# Patient Record
Sex: MALE | Race: WHITE | Employment: OTHER | URBAN - METROPOLITAN AREA
[De-identification: names, ages, dates, MRNs, and addresses within clinical notes are randomized per-mention and may not be internally consistent; named-entity substitution may affect disease eponyms.]

---

## 2017-03-30 ENCOUNTER — HOSPITAL ENCOUNTER (OUTPATIENT)
Dept: OTHER | Age: 62
Discharge: HOME | End: 2017-03-30
Attending: INTERNAL MEDICINE | Admitting: INTERNAL MEDICINE

## 2017-03-30 LAB
ANION GAP SERPL CALCULATED.3IONS-SCNC: 14 MMOL/L (ref 3–16)
BUN BLDV-MCNC: 13 MG/DL (ref 7–20)
CALCIUM SERPL-MCNC: 8.4 MG/DL (ref 8.3–10.6)
CHLORIDE BLD-SCNC: 101 MMOL/L (ref 99–110)
CO2: 21 MMOL/L (ref 21–32)
CREAT SERPL-MCNC: 1 MG/DL (ref 0.8–1.3)
GFR AFRICAN AMERICAN: >60
GFR NON-AFRICAN AMERICAN: >60
GLUCOSE BLD-MCNC: 90 MG/DL (ref 70–99)
POTASSIUM SERPL-SCNC: 4.4 MMOL/L (ref 3.5–5.1)
SODIUM BLD-SCNC: 136 MMOL/L (ref 136–145)

## 2017-04-12 ENCOUNTER — HOSPITAL ENCOUNTER (OUTPATIENT)
Dept: OTHER | Age: 62
Discharge: HOME | End: 2017-04-12
Attending: INTERNAL MEDICINE | Admitting: INTERNAL MEDICINE

## 2017-04-12 DIAGNOSIS — R52 PAIN: ICD-10-CM

## 2018-09-21 ENCOUNTER — HOSPITAL ENCOUNTER (OUTPATIENT)
Dept: CT IMAGING | Age: 63
Discharge: HOME | End: 2018-09-21
Payer: COMMERCIAL

## 2018-09-21 DIAGNOSIS — L03.116 CELLULITIS OF LEFT LOWER EXTREMITY: ICD-10-CM

## 2018-09-21 PROCEDURE — 73700 CT LOWER EXTREMITY W/O DYE: CPT

## 2018-12-20 ENCOUNTER — OFFICE VISIT (OUTPATIENT)
Dept: ORTHOPEDIC SURGERY | Age: 63
End: 2018-12-20
Payer: COMMERCIAL

## 2018-12-20 VITALS — BODY MASS INDEX: 37.19 KG/M2 | HEIGHT: 77 IN | WEIGHT: 315 LBS

## 2018-12-20 DIAGNOSIS — M17.0 PRIMARY OSTEOARTHRITIS OF BOTH KNEES: Primary | ICD-10-CM

## 2018-12-20 DIAGNOSIS — M25.562 ACUTE PAIN OF BOTH KNEES: ICD-10-CM

## 2018-12-20 DIAGNOSIS — M25.561 ACUTE PAIN OF BOTH KNEES: ICD-10-CM

## 2018-12-20 PROCEDURE — 99243 OFF/OP CNSLTJ NEW/EST LOW 30: CPT | Performed by: ORTHOPAEDIC SURGERY

## 2018-12-20 RX ORDER — LEVOTHYROXINE SODIUM 112 UG/1
112 CAPSULE ORAL DAILY
COMMUNITY

## 2018-12-20 RX ORDER — GABAPENTIN 300 MG/1
300 CAPSULE ORAL 3 TIMES DAILY
COMMUNITY

## 2018-12-20 RX ORDER — MONTELUKAST SODIUM 10 MG/1
10 TABLET ORAL NIGHTLY
COMMUNITY

## 2018-12-20 RX ORDER — AZELASTINE 1 MG/ML
1 SPRAY, METERED NASAL 2 TIMES DAILY
COMMUNITY

## 2018-12-20 RX ORDER — METOPROLOL SUCCINATE 50 MG/1
200 TABLET, EXTENDED RELEASE ORAL DAILY
COMMUNITY
Start: 2017-08-28

## 2018-12-20 RX ORDER — LISINOPRIL 40 MG/1
30 TABLET ORAL DAILY
COMMUNITY
Start: 2017-03-08

## 2018-12-20 RX ORDER — VARDENAFIL HYDROCHLORIDE 20 MG/1
TABLET ORAL
COMMUNITY

## 2018-12-20 RX ORDER — METOPROLOL SUCCINATE 100 MG/1
100 TABLET, EXTENDED RELEASE ORAL
COMMUNITY
Start: 2017-01-23 | End: 2019-09-19

## 2018-12-20 RX ORDER — CLONAZEPAM 1 MG/1
TABLET ORAL
COMMUNITY
End: 2021-11-28 | Stop reason: ALTCHOICE

## 2018-12-20 RX ORDER — WARFARIN SODIUM 5 MG/1
5 TABLET ORAL
COMMUNITY

## 2018-12-20 RX ORDER — TORSEMIDE 20 MG/1
20 TABLET ORAL DAILY
COMMUNITY
Start: 2016-04-13 | End: 2021-11-28 | Stop reason: ALTCHOICE

## 2018-12-20 RX ORDER — LORATADINE 10 MG/1
10 TABLET ORAL DAILY
COMMUNITY

## 2018-12-20 RX ORDER — ATORVASTATIN CALCIUM 10 MG/1
10 TABLET, FILM COATED ORAL DAILY
COMMUNITY

## 2018-12-20 RX ORDER — DIGOXIN 250 MCG
125 TABLET ORAL DAILY
COMMUNITY
Start: 2017-08-21

## 2019-01-17 ENCOUNTER — TELEPHONE (OUTPATIENT)
Dept: ORTHOPEDIC SURGERY | Age: 64
End: 2019-01-17

## 2019-02-04 ENCOUNTER — TELEPHONE (OUTPATIENT)
Dept: ORTHOPEDIC SURGERY | Age: 64
End: 2019-02-04

## 2019-02-13 ENCOUNTER — APPOINTMENT (OUTPATIENT)
Dept: GENERAL RADIOLOGY | Age: 64
End: 2019-02-13
Payer: COMMERCIAL

## 2019-02-13 ENCOUNTER — HOSPITAL ENCOUNTER (EMERGENCY)
Age: 64
Discharge: HOME | End: 2019-02-13
Attending: EMERGENCY MEDICINE
Payer: COMMERCIAL

## 2019-02-13 VITALS
OXYGEN SATURATION: 96 % | SYSTOLIC BLOOD PRESSURE: 129 MMHG | BODY MASS INDEX: 37.19 KG/M2 | RESPIRATION RATE: 21 BRPM | HEART RATE: 94 BPM | TEMPERATURE: 97.5 F | DIASTOLIC BLOOD PRESSURE: 84 MMHG | HEIGHT: 77 IN | WEIGHT: 315 LBS

## 2019-02-13 DIAGNOSIS — R07.89 ATYPICAL CHEST PAIN: Primary | ICD-10-CM

## 2019-02-13 LAB
A/G RATIO: 1.3 (ref 1.1–2.2)
ALBUMIN SERPL-MCNC: 4.3 G/DL (ref 3.4–5)
ALP BLD-CCNC: 59 U/L (ref 40–129)
ALT SERPL-CCNC: 28 U/L (ref 10–40)
ANION GAP SERPL CALCULATED.3IONS-SCNC: 16 MMOL/L (ref 3–16)
APTT: 34.6 SEC (ref 26–36)
AST SERPL-CCNC: 27 U/L (ref 15–37)
BASOPHILS ABSOLUTE: 0.1 K/UL (ref 0–0.2)
BASOPHILS RELATIVE PERCENT: 1.1 %
BILIRUB SERPL-MCNC: 1.4 MG/DL (ref 0–1)
BUN BLDV-MCNC: 22 MG/DL (ref 7–20)
CALCIUM SERPL-MCNC: 9.9 MG/DL (ref 8.3–10.6)
CHLORIDE BLD-SCNC: 100 MMOL/L (ref 99–110)
CO2: 28 MMOL/L (ref 21–32)
CREAT SERPL-MCNC: 0.9 MG/DL (ref 0.8–1.3)
EKG ATRIAL RATE: 63 BPM
EKG DIAGNOSIS: NORMAL
EKG Q-T INTERVAL: 358 MS
EKG QRS DURATION: 100 MS
EKG QTC CALCULATION (BAZETT): 461 MS
EKG R AXIS: 76 DEGREES
EKG T AXIS: -17 DEGREES
EKG VENTRICULAR RATE: 100 BPM
EOSINOPHILS ABSOLUTE: 0.2 K/UL (ref 0–0.6)
EOSINOPHILS RELATIVE PERCENT: 2.4 %
GFR AFRICAN AMERICAN: >60
GFR NON-AFRICAN AMERICAN: >60
GLOBULIN: 3.4 G/DL
GLUCOSE BLD-MCNC: 89 MG/DL (ref 70–99)
HCT VFR BLD CALC: 49.1 % (ref 40.5–52.5)
HEMOGLOBIN: 16.2 G/DL (ref 13.5–17.5)
INR BLD: 1.28 (ref 0.86–1.14)
LYMPHOCYTES ABSOLUTE: 1.9 K/UL (ref 1–5.1)
LYMPHOCYTES RELATIVE PERCENT: 26.3 %
MCH RBC QN AUTO: 29.9 PG (ref 26–34)
MCHC RBC AUTO-ENTMCNC: 33 G/DL (ref 31–36)
MCV RBC AUTO: 90.8 FL (ref 80–100)
MONOCYTES ABSOLUTE: 0.8 K/UL (ref 0–1.3)
MONOCYTES RELATIVE PERCENT: 10.6 %
NEUTROPHILS ABSOLUTE: 4.3 K/UL (ref 1.7–7.7)
NEUTROPHILS RELATIVE PERCENT: 59.6 %
PDW BLD-RTO: 15.4 % (ref 12.4–15.4)
PLATELET # BLD: 155 K/UL (ref 135–450)
PMV BLD AUTO: 9.6 FL (ref 5–10.5)
POTASSIUM SERPL-SCNC: 4.3 MMOL/L (ref 3.5–5.1)
PROTHROMBIN TIME: 14.5 SEC (ref 9.8–13)
RBC # BLD: 5.41 M/UL (ref 4.2–5.9)
SODIUM BLD-SCNC: 144 MMOL/L (ref 136–145)
TOTAL PROTEIN: 7.7 G/DL (ref 6.4–8.2)
TROPONIN: <0.01 NG/ML
TROPONIN: <0.01 NG/ML
WBC # BLD: 7.3 K/UL (ref 4–11)

## 2019-02-13 PROCEDURE — 36415 COLL VENOUS BLD VENIPUNCTURE: CPT

## 2019-02-13 PROCEDURE — 93010 ELECTROCARDIOGRAM REPORT: CPT | Performed by: INTERNAL MEDICINE

## 2019-02-13 PROCEDURE — 99285 EMERGENCY DEPT VISIT HI MDM: CPT

## 2019-02-13 PROCEDURE — 71045 X-RAY EXAM CHEST 1 VIEW: CPT

## 2019-02-13 PROCEDURE — 85730 THROMBOPLASTIN TIME PARTIAL: CPT

## 2019-02-13 PROCEDURE — 93005 ELECTROCARDIOGRAM TRACING: CPT | Performed by: EMERGENCY MEDICINE

## 2019-02-13 PROCEDURE — 84484 ASSAY OF TROPONIN QUANT: CPT

## 2019-02-13 PROCEDURE — 85610 PROTHROMBIN TIME: CPT

## 2019-02-13 PROCEDURE — 85025 COMPLETE CBC W/AUTO DIFF WBC: CPT

## 2019-02-13 PROCEDURE — 80053 COMPREHEN METABOLIC PANEL: CPT

## 2019-02-13 RX ORDER — SPIRONOLACTONE 25 MG/1
25 TABLET ORAL DAILY
COMMUNITY

## 2019-02-13 ASSESSMENT — ENCOUNTER SYMPTOMS
STRIDOR: 0
WHEEZING: 0
COUGH: 0
BACK PAIN: 0
CHOKING: 0
ABDOMINAL DISTENTION: 0
CHEST TIGHTNESS: 1
ABDOMINAL PAIN: 0
SHORTNESS OF BREATH: 0

## 2019-02-20 ENCOUNTER — OFFICE VISIT (OUTPATIENT)
Dept: ORTHOPEDIC SURGERY | Age: 64
End: 2019-02-20
Payer: COMMERCIAL

## 2019-02-20 VITALS — BODY MASS INDEX: 37.19 KG/M2 | HEIGHT: 77 IN | WEIGHT: 315 LBS

## 2019-02-20 DIAGNOSIS — M17.0 PRIMARY OSTEOARTHRITIS OF BOTH KNEES: Primary | ICD-10-CM

## 2019-02-20 PROCEDURE — 20610 DRAIN/INJ JOINT/BURSA W/O US: CPT | Performed by: ORTHOPAEDIC SURGERY

## 2019-02-27 ENCOUNTER — OFFICE VISIT (OUTPATIENT)
Dept: ORTHOPEDIC SURGERY | Age: 64
End: 2019-02-27
Payer: COMMERCIAL

## 2019-02-27 DIAGNOSIS — M17.0 PRIMARY OSTEOARTHRITIS OF BOTH KNEES: Primary | ICD-10-CM

## 2019-02-27 PROCEDURE — 20610 DRAIN/INJ JOINT/BURSA W/O US: CPT | Performed by: ORTHOPAEDIC SURGERY

## 2019-03-06 ENCOUNTER — OFFICE VISIT (OUTPATIENT)
Dept: ORTHOPEDIC SURGERY | Age: 64
End: 2019-03-06
Payer: COMMERCIAL

## 2019-03-06 DIAGNOSIS — M17.0 PRIMARY OSTEOARTHRITIS OF BOTH KNEES: Primary | ICD-10-CM

## 2019-03-06 PROCEDURE — 20610 DRAIN/INJ JOINT/BURSA W/O US: CPT | Performed by: ORTHOPAEDIC SURGERY

## 2019-09-19 ENCOUNTER — HOSPITAL ENCOUNTER (OUTPATIENT)
Dept: WOUND CARE | Age: 64
Discharge: HOME | End: 2019-09-19
Payer: OTHER GOVERNMENT

## 2019-09-19 VITALS
DIASTOLIC BLOOD PRESSURE: 75 MMHG | RESPIRATION RATE: 18 BRPM | BODY MASS INDEX: 37.19 KG/M2 | WEIGHT: 315 LBS | HEIGHT: 77 IN | HEART RATE: 93 BPM | TEMPERATURE: 97.5 F | SYSTOLIC BLOOD PRESSURE: 112 MMHG

## 2019-09-19 DIAGNOSIS — I83.029 VENOUS ULCER OF LEFT LEG (HCC): ICD-10-CM

## 2019-09-19 DIAGNOSIS — L97.929 VENOUS ULCER OF LEFT LEG (HCC): ICD-10-CM

## 2019-09-19 PROCEDURE — 29580 STRAPPING UNNA BOOT: CPT

## 2019-09-19 PROCEDURE — 97597 DBRDMT OPN WND 1ST 20 CM/<: CPT | Performed by: CLINICAL NURSE SPECIALIST

## 2019-09-19 PROCEDURE — 99203 OFFICE O/P NEW LOW 30 MIN: CPT | Performed by: CLINICAL NURSE SPECIALIST

## 2019-09-19 PROCEDURE — 97597 DBRDMT OPN WND 1ST 20 CM/<: CPT

## 2019-09-19 RX ORDER — LIDOCAINE 40 MG/G
CREAM TOPICAL ONCE
Status: DISCONTINUED | OUTPATIENT
Start: 2019-09-19 | End: 2019-09-20 | Stop reason: HOSPADM

## 2019-09-19 RX ORDER — TESTOSTERONE GEL, 1% 10 MG/G
GEL TRANSDERMAL DAILY
COMMUNITY
End: 2021-11-28 | Stop reason: ALTCHOICE

## 2019-09-19 ASSESSMENT — PAIN DESCRIPTION - PAIN TYPE: TYPE: CHRONIC PAIN

## 2019-09-19 ASSESSMENT — PAIN DESCRIPTION - PROGRESSION: CLINICAL_PROGRESSION: NOT CHANGED

## 2019-09-19 ASSESSMENT — PAIN DESCRIPTION - LOCATION: LOCATION: FOOT

## 2019-09-19 ASSESSMENT — PAIN DESCRIPTION - ONSET: ONSET: GRADUAL

## 2019-09-19 ASSESSMENT — PAIN DESCRIPTION - FREQUENCY: FREQUENCY: INTERMITTENT

## 2019-09-19 ASSESSMENT — PAIN DESCRIPTION - ORIENTATION: ORIENTATION: RIGHT

## 2019-09-19 ASSESSMENT — PAIN - FUNCTIONAL ASSESSMENT: PAIN_FUNCTIONAL_ASSESSMENT: ACTIVITIES ARE NOT PREVENTED

## 2019-09-19 ASSESSMENT — PAIN SCALES - GENERAL: PAINLEVEL_OUTOF10: 8

## 2019-09-19 NOTE — PLAN OF CARE
Wound debrided today per APRN, discussed importance of lifetime compression, he declines ordering of compression garment for LLE at this time, discussed importance of weight loss, patient is aware of need for walking in order for unna boot to be effective in healing wound, f/u x 1 week, MD orders/D/C instructions reviewed with patient, all questions answered; copy of instructions given to patient

## 2019-09-22 PROBLEM — I83.029 VENOUS ULCER OF LEFT LEG (HCC): Status: ACTIVE | Noted: 2019-09-22

## 2019-09-22 PROBLEM — L97.929 VENOUS ULCER OF LEFT LEG (HCC): Status: ACTIVE | Noted: 2019-09-22

## 2019-09-22 NOTE — PROGRESS NOTES
Objective:     Vitals:    09/19/19 1242   BP: 112/75   Pulse: 93   Resp: 18   Temp: 97.5 °F (36.4 °C)   Weight: (!) 407 lb (184.6 kg)   Height: 6' 5\" (1.956 m)     General Appearance: alert and oriented to person, place and time, obese, well developed and well-nourished, in no acute distress  Psychiatric:  Mood and affect appropriate for situation  Skin: warm and dry, no rash  Head: normocephalic and atraumatic  Eyes: pupils equal, round, sclerae anicteric, conjunctivae normal  ENT: no thrush or oral ulcers  Neck:No complaints, normal appearance  Pulmonary/Chest: Respirations easy at rest, no cough or respiratory distress  Cardiovascular: No chest pain, normal rate, toes warm, cap refill normal, JAROCHO: Right: 0.89, Left: 0.96  Abdomen: No nausea or vomiting  Extremities: no cyanosis or cellulitis. Bilateral leg edema and hemosiderin staining  Musculoskeletal: Ambulatory, moves all extremities, no deformities  Neurologic: distal sensation to light touch intact, no allodynia. Libby-ulcer skin: hemosiderin changes. Ulcer(s): Wound with red base and biofilm. Wound bed moist, moderate amount of serous drainage, edges open and attached. Surrounding tissue and ulcer without signs and symptoms of infection. No purulence, malodor, erythema, increased temperature, or increased pain. [employer] Photos also saved in electronic chart.     Today's Wound Measurements:  Wound 09/19/19 #1, left lateral leg, venous, partial thickness, onset 9/2018?-Wound Length (cm): 2.5 cm    Wound 09/19/19 #1, left lateral leg, venous, partial thickness, onset 9/2018?-Wound Width (cm): 2.3 cm    Wound 09/19/19 #1, left lateral leg, venous, partial thickness, onset 9/2018?-Wound Depth (cm): 0.2 cm  _____________________________    Lab Results   Component Value Date    LABALBU 4.3 02/13/2019     Lab Results   Component Value Date    CREATININE 0.9 02/13/2019     Lab Results   Component Value Date    HGB 16.2 02/13/2019     No results found for: LABA1C  Assessment:     Patient Active Problem List   Diagnosis Code    Acute pain of both knees M25.561, M25.562    Primary osteoarthritis of both knees M17.0    [facility] I83.029, L97.929       Assessment of today's active condition(s): Left VLU, requiring debridement and compression. Factors contributing to occurrence and/or persistence of the chronic ulcer include edema, venous stasis, shear force and obesity. Medical necessity of today's visit is shown by the above documentation. Sharp debridement is indicated today, based upon the exam findings in the ulcer(s) above. Procedure note:     Consent obtained. Time out performed per [facility] policy. Anesthetic  Anesthetic: 4% Lidocaine Liquid Topical     Using a curette, I sharply debrided the left lower leg ulcer(s) down through and including the removal of epidermis and dermis. The type(s) of tissue debrided included biofilm and exudate. Total Surface Area Debrided: 5.75 sq cm. The ulcers were then irrigated with normal saline solution. The procedure was completed with a small amount of bleeding, and hemostasis was with pressure. The patient tolerated the procedure well, with no significant complications. The patient's level of pain during and after the procedure was monitored. Post-debridement measurements, if different from pre-debridement, are in the flowsheet as well. Per physician order, left application of Unna boot was performed in the wound care center today, to help manage edema, stasis dermatitis, and/or venous ulcers. Leave primary dressing, Unna boot and secondary layer(s) in place until the next appointment. Also discussed ways to keep the wrap dry for a shower, including a plastic cast-guard, available in retail pharmacies.      I reminded the patient of the importance of weight management also encouraged ambulation as tolerated, additional lower extremity exercises as instructed in our education sheet, leg elevation when at rest, and compliance with any recommended dietary, diuretic and compression therapies. Discharge plan:     Use lymphedema pumps 2 times per day for 1 hour each  Leg elevations 2-3 times per day for 30-40 minutes  Continue ambulation as tolerated  Watch diet, increase protein in diet, try to lose weight  Consider ongoing compression garments    Treatment in the wound care center today: Wound 09/19/19 #1, left lateral leg, venous, partial thickness, onset 9/2018?-Dressing/Treatment: Other (comment)(zinc,opticell ag,pink unna,coban,G spandagrip). Home treatment: good handwashing before and after any dressing changes. Cleanse ulcer with saline or soap & water before dressing change. May use Vaseline (petrolatum), Aquaphor, Aveeno, CeraVe, Cetaphil, Eucerin, Lubriderm, etc for dry skin. Dressing type for home: As above for the week. Written discharge instructions given to patient. Follow up in 1 week.     Electronically signed by AIDA Temple CNP on 9/22/2019 at 5:38 PM.

## 2019-09-26 ENCOUNTER — HOSPITAL ENCOUNTER (OUTPATIENT)
Dept: WOUND CARE | Age: 64
Discharge: HOME | End: 2019-09-26
Payer: OTHER GOVERNMENT

## 2019-09-26 VITALS
SYSTOLIC BLOOD PRESSURE: 137 MMHG | BODY MASS INDEX: 37.19 KG/M2 | TEMPERATURE: 97.1 F | HEART RATE: 53 BPM | RESPIRATION RATE: 18 BRPM | WEIGHT: 315 LBS | DIASTOLIC BLOOD PRESSURE: 83 MMHG | HEIGHT: 77 IN

## 2019-09-26 DIAGNOSIS — I83.029 VENOUS ULCER OF LEFT LEG (HCC): ICD-10-CM

## 2019-09-26 DIAGNOSIS — L97.929 VENOUS ULCER OF LEFT LEG (HCC): ICD-10-CM

## 2019-09-26 PROCEDURE — 97597 DBRDMT OPN WND 1ST 20 CM/<: CPT | Performed by: CLINICAL NURSE SPECIALIST

## 2019-09-26 PROCEDURE — 97597 DBRDMT OPN WND 1ST 20 CM/<: CPT

## 2019-09-26 RX ORDER — LIDOCAINE 40 MG/G
CREAM TOPICAL ONCE
Status: DISCONTINUED | OUTPATIENT
Start: 2019-09-26 | End: 2019-09-27 | Stop reason: HOSPADM

## 2019-09-26 ASSESSMENT — PAIN DESCRIPTION - PROGRESSION: CLINICAL_PROGRESSION: NOT CHANGED

## 2019-09-26 ASSESSMENT — PAIN DESCRIPTION - ONSET: ONSET: GRADUAL

## 2019-09-26 ASSESSMENT — PAIN SCALES - GENERAL: PAINLEVEL_OUTOF10: 4

## 2019-09-26 ASSESSMENT — PAIN DESCRIPTION - DESCRIPTORS: DESCRIPTORS: ACHING

## 2019-09-26 ASSESSMENT — PAIN DESCRIPTION - PAIN TYPE: TYPE: CHRONIC PAIN

## 2019-09-26 ASSESSMENT — PAIN DESCRIPTION - LOCATION: LOCATION: KNEE

## 2019-09-26 ASSESSMENT — PAIN DESCRIPTION - ORIENTATION: ORIENTATION: RIGHT

## 2019-09-26 ASSESSMENT — PAIN DESCRIPTION - FREQUENCY: FREQUENCY: INTERMITTENT

## 2019-09-26 ASSESSMENT — PAIN - FUNCTIONAL ASSESSMENT: PAIN_FUNCTIONAL_ASSESSMENT: ACTIVITIES ARE NOT PREVENTED

## 2019-10-03 ENCOUNTER — HOSPITAL ENCOUNTER (OUTPATIENT)
Dept: WOUND CARE | Age: 64
Discharge: HOME | End: 2019-10-03
Payer: OTHER GOVERNMENT

## 2019-10-03 VITALS
RESPIRATION RATE: 22 BRPM | DIASTOLIC BLOOD PRESSURE: 62 MMHG | HEART RATE: 79 BPM | BODY MASS INDEX: 37.19 KG/M2 | SYSTOLIC BLOOD PRESSURE: 104 MMHG | TEMPERATURE: 98.1 F | WEIGHT: 315 LBS | HEIGHT: 77 IN

## 2019-10-03 DIAGNOSIS — L97.929 VENOUS ULCER OF LEFT LEG (HCC): ICD-10-CM

## 2019-10-03 DIAGNOSIS — I83.029 VENOUS ULCER OF LEFT LEG (HCC): ICD-10-CM

## 2019-10-03 PROCEDURE — 97597 DBRDMT OPN WND 1ST 20 CM/<: CPT

## 2019-10-03 PROCEDURE — 29580 STRAPPING UNNA BOOT: CPT

## 2019-10-03 PROCEDURE — 97597 DBRDMT OPN WND 1ST 20 CM/<: CPT | Performed by: CLINICAL NURSE SPECIALIST

## 2019-10-03 RX ORDER — LIDOCAINE 40 MG/G
CREAM TOPICAL ONCE
Status: DISCONTINUED | OUTPATIENT
Start: 2019-10-03 | End: 2019-10-04 | Stop reason: HOSPADM

## 2019-10-03 ASSESSMENT — PAIN SCALES - GENERAL
PAINLEVEL_OUTOF10: 0
PAINLEVEL_OUTOF10: 0

## 2019-10-10 ENCOUNTER — HOSPITAL ENCOUNTER (OUTPATIENT)
Dept: WOUND CARE | Age: 64
Discharge: HOME | End: 2019-10-10
Payer: OTHER GOVERNMENT

## 2019-10-10 VITALS
RESPIRATION RATE: 24 BRPM | DIASTOLIC BLOOD PRESSURE: 80 MMHG | SYSTOLIC BLOOD PRESSURE: 110 MMHG | TEMPERATURE: 98 F | WEIGHT: 315 LBS | HEIGHT: 77 IN | HEART RATE: 70 BPM | BODY MASS INDEX: 37.19 KG/M2

## 2019-10-10 DIAGNOSIS — L97.929 VENOUS ULCER OF LEFT LEG (HCC): ICD-10-CM

## 2019-10-10 DIAGNOSIS — I89.0 LYMPHEDEMA: ICD-10-CM

## 2019-10-10 DIAGNOSIS — I83.029 VENOUS ULCER OF LEFT LEG (HCC): ICD-10-CM

## 2019-10-10 PROCEDURE — 97597 DBRDMT OPN WND 1ST 20 CM/<: CPT | Performed by: CLINICAL NURSE SPECIALIST

## 2019-10-10 PROCEDURE — 29580 STRAPPING UNNA BOOT: CPT

## 2019-10-10 PROCEDURE — 97597 DBRDMT OPN WND 1ST 20 CM/<: CPT

## 2019-10-10 RX ORDER — LIDOCAINE 40 MG/G
CREAM TOPICAL ONCE
Status: DISCONTINUED | OUTPATIENT
Start: 2019-10-10 | End: 2019-10-11 | Stop reason: HOSPADM

## 2019-10-11 PROBLEM — I89.0 LYMPHEDEMA: Status: ACTIVE | Noted: 2019-10-11

## 2019-10-17 ENCOUNTER — HOSPITAL ENCOUNTER (OUTPATIENT)
Dept: WOUND CARE | Age: 64
Discharge: HOME | End: 2019-10-17
Payer: OTHER GOVERNMENT

## 2019-10-17 VITALS
TEMPERATURE: 97.1 F | WEIGHT: 315 LBS | HEART RATE: 94 BPM | SYSTOLIC BLOOD PRESSURE: 109 MMHG | HEIGHT: 77 IN | DIASTOLIC BLOOD PRESSURE: 73 MMHG | BODY MASS INDEX: 37.19 KG/M2 | RESPIRATION RATE: 22 BRPM

## 2019-10-17 DIAGNOSIS — I83.029 VENOUS ULCER OF LEFT LEG (HCC): ICD-10-CM

## 2019-10-17 DIAGNOSIS — L97.929 VENOUS ULCER OF LEFT LEG (HCC): ICD-10-CM

## 2019-10-17 DIAGNOSIS — I89.0 LYMPHEDEMA: ICD-10-CM

## 2019-10-17 PROCEDURE — 29580 STRAPPING UNNA BOOT: CPT

## 2019-10-17 PROCEDURE — 97597 DBRDMT OPN WND 1ST 20 CM/<: CPT | Performed by: CLINICAL NURSE SPECIALIST

## 2019-10-17 PROCEDURE — 97597 DBRDMT OPN WND 1ST 20 CM/<: CPT

## 2019-10-17 RX ORDER — LIDOCAINE 40 MG/G
CREAM TOPICAL ONCE
Status: DISCONTINUED | OUTPATIENT
Start: 2019-10-17 | End: 2019-10-18 | Stop reason: HOSPADM

## 2019-10-24 ENCOUNTER — HOSPITAL ENCOUNTER (OUTPATIENT)
Dept: WOUND CARE | Age: 64
Discharge: HOME | End: 2019-10-24
Payer: OTHER GOVERNMENT

## 2019-10-24 VITALS
HEART RATE: 95 BPM | HEIGHT: 77 IN | RESPIRATION RATE: 20 BRPM | BODY MASS INDEX: 37.19 KG/M2 | SYSTOLIC BLOOD PRESSURE: 127 MMHG | DIASTOLIC BLOOD PRESSURE: 73 MMHG | WEIGHT: 315 LBS | TEMPERATURE: 97.3 F

## 2019-10-24 DIAGNOSIS — I89.0 LYMPHEDEMA: ICD-10-CM

## 2019-10-24 DIAGNOSIS — L97.929 VENOUS ULCER OF LEFT LEG (HCC): ICD-10-CM

## 2019-10-24 DIAGNOSIS — I83.029 VENOUS ULCER OF LEFT LEG (HCC): ICD-10-CM

## 2019-10-24 PROCEDURE — 29580 STRAPPING UNNA BOOT: CPT

## 2019-10-24 PROCEDURE — 29580 STRAPPING UNNA BOOT: CPT | Performed by: CLINICAL NURSE SPECIALIST

## 2019-10-24 RX ORDER — LIDOCAINE 40 MG/G
CREAM TOPICAL ONCE
Status: DISCONTINUED | OUTPATIENT
Start: 2019-10-24 | End: 2019-10-25 | Stop reason: HOSPADM

## 2019-10-24 ASSESSMENT — PAIN SCALES - GENERAL: PAINLEVEL_OUTOF10: 0

## 2019-10-31 ENCOUNTER — HOSPITAL ENCOUNTER (OUTPATIENT)
Dept: WOUND CARE | Age: 64
Discharge: HOME | End: 2019-10-31
Payer: OTHER GOVERNMENT

## 2019-10-31 VITALS
SYSTOLIC BLOOD PRESSURE: 100 MMHG | RESPIRATION RATE: 20 BRPM | HEIGHT: 77 IN | DIASTOLIC BLOOD PRESSURE: 66 MMHG | TEMPERATURE: 97.6 F | WEIGHT: 315 LBS | BODY MASS INDEX: 37.19 KG/M2 | OXYGEN SATURATION: 98 % | HEART RATE: 93 BPM

## 2019-10-31 DIAGNOSIS — L97.929 VENOUS ULCER OF LEFT LEG (HCC): ICD-10-CM

## 2019-10-31 DIAGNOSIS — I83.029 VENOUS ULCER OF LEFT LEG (HCC): ICD-10-CM

## 2019-10-31 DIAGNOSIS — I89.0 LYMPHEDEMA: ICD-10-CM

## 2019-10-31 PROCEDURE — 29580 STRAPPING UNNA BOOT: CPT

## 2019-10-31 PROCEDURE — 29580 STRAPPING UNNA BOOT: CPT | Performed by: CLINICAL NURSE SPECIALIST

## 2019-10-31 RX ORDER — LIDOCAINE 40 MG/G
CREAM TOPICAL PRN
Status: DISCONTINUED | OUTPATIENT
Start: 2019-10-31 | End: 2019-11-01 | Stop reason: HOSPADM

## 2019-11-07 ENCOUNTER — HOSPITAL ENCOUNTER (OUTPATIENT)
Dept: WOUND CARE | Age: 64
Discharge: HOME | End: 2019-11-07
Payer: OTHER GOVERNMENT

## 2019-11-07 DIAGNOSIS — I83.029 VENOUS ULCER OF LEFT LEG (HCC): ICD-10-CM

## 2019-11-07 DIAGNOSIS — L97.929 VENOUS ULCER OF LEFT LEG (HCC): ICD-10-CM

## 2019-11-07 DIAGNOSIS — I89.0 LYMPHEDEMA: ICD-10-CM

## 2019-11-11 ENCOUNTER — HOSPITAL ENCOUNTER (OUTPATIENT)
Dept: WOUND CARE | Age: 64
Discharge: HOME | End: 2019-11-11
Payer: OTHER GOVERNMENT

## 2019-11-11 VITALS
DIASTOLIC BLOOD PRESSURE: 69 MMHG | BODY MASS INDEX: 37.19 KG/M2 | SYSTOLIC BLOOD PRESSURE: 103 MMHG | HEART RATE: 85 BPM | WEIGHT: 315 LBS | HEIGHT: 77 IN | RESPIRATION RATE: 18 BRPM | TEMPERATURE: 97.7 F

## 2019-11-11 DIAGNOSIS — I89.0 LYMPHEDEMA: ICD-10-CM

## 2019-11-11 DIAGNOSIS — I83.029 VENOUS ULCER OF LEFT LEG (HCC): ICD-10-CM

## 2019-11-11 DIAGNOSIS — L97.929 VENOUS ULCER OF LEFT LEG (HCC): ICD-10-CM

## 2019-11-11 PROCEDURE — 87205 SMEAR GRAM STAIN: CPT

## 2019-11-11 PROCEDURE — 87186 SC STD MICRODIL/AGAR DIL: CPT

## 2019-11-11 PROCEDURE — 87070 CULTURE OTHR SPECIMN AEROBIC: CPT

## 2019-11-11 PROCEDURE — 29580 STRAPPING UNNA BOOT: CPT

## 2019-11-11 PROCEDURE — 87077 CULTURE AEROBIC IDENTIFY: CPT

## 2019-11-11 ASSESSMENT — PAIN DESCRIPTION - ONSET: ONSET: ON-GOING

## 2019-11-11 ASSESSMENT — PAIN DESCRIPTION - FREQUENCY: FREQUENCY: INTERMITTENT

## 2019-11-11 ASSESSMENT — PAIN DESCRIPTION - PAIN TYPE: TYPE: NEUROPATHIC PAIN

## 2019-11-11 ASSESSMENT — PAIN DESCRIPTION - ORIENTATION: ORIENTATION: LEFT

## 2019-11-11 ASSESSMENT — PAIN SCALES - GENERAL: PAINLEVEL_OUTOF10: 7

## 2019-11-11 ASSESSMENT — PAIN DESCRIPTION - DESCRIPTORS: DESCRIPTORS: BURNING;OTHER (COMMENT)

## 2019-11-11 ASSESSMENT — PAIN - FUNCTIONAL ASSESSMENT: PAIN_FUNCTIONAL_ASSESSMENT: PREVENTS OR INTERFERES SOME ACTIVE ACTIVITIES AND ADLS

## 2019-11-11 ASSESSMENT — PAIN DESCRIPTION - PROGRESSION: CLINICAL_PROGRESSION: NOT CHANGED

## 2019-11-11 ASSESSMENT — PAIN DESCRIPTION - LOCATION: LOCATION: LEG

## 2019-11-14 ENCOUNTER — HOSPITAL ENCOUNTER (OUTPATIENT)
Dept: WOUND CARE | Age: 64
Discharge: HOME | End: 2019-11-14
Payer: COMMERCIAL

## 2019-11-14 VITALS
HEIGHT: 77 IN | HEART RATE: 69 BPM | SYSTOLIC BLOOD PRESSURE: 106 MMHG | DIASTOLIC BLOOD PRESSURE: 77 MMHG | TEMPERATURE: 97 F | BODY MASS INDEX: 37.19 KG/M2 | RESPIRATION RATE: 18 BRPM | WEIGHT: 315 LBS

## 2019-11-14 DIAGNOSIS — L97.929 VENOUS ULCER OF LEFT LEG (HCC): ICD-10-CM

## 2019-11-14 DIAGNOSIS — I89.0 LYMPHEDEMA: ICD-10-CM

## 2019-11-14 DIAGNOSIS — I83.029 VENOUS ULCER OF LEFT LEG (HCC): ICD-10-CM

## 2019-11-14 LAB
GRAM STAIN RESULT: ABNORMAL
ORGANISM: ABNORMAL
WOUND/ABSCESS: ABNORMAL

## 2019-11-14 PROCEDURE — 29581 APPL MULTLAYER CMPRN SYS LEG: CPT

## 2019-11-14 PROCEDURE — 97597 DBRDMT OPN WND 1ST 20 CM/<: CPT | Performed by: CLINICAL NURSE SPECIALIST

## 2019-11-14 PROCEDURE — 97597 DBRDMT OPN WND 1ST 20 CM/<: CPT

## 2019-11-14 RX ORDER — CIPROFLOXACIN 500 MG/1
500 TABLET, FILM COATED ORAL 2 TIMES DAILY
Qty: 14 TABLET | Refills: 0 | Status: SHIPPED | OUTPATIENT
Start: 2019-11-14 | End: 2019-11-21

## 2019-11-14 RX ORDER — LIDOCAINE 40 MG/G
CREAM TOPICAL ONCE
Status: DISCONTINUED | OUTPATIENT
Start: 2019-11-14 | End: 2019-11-15 | Stop reason: HOSPADM

## 2019-11-14 ASSESSMENT — PAIN DESCRIPTION - PAIN TYPE: TYPE: NEUROPATHIC PAIN

## 2019-11-14 ASSESSMENT — PAIN DESCRIPTION - FREQUENCY: FREQUENCY: INTERMITTENT

## 2019-11-14 ASSESSMENT — PAIN - FUNCTIONAL ASSESSMENT: PAIN_FUNCTIONAL_ASSESSMENT: PREVENTS OR INTERFERES SOME ACTIVE ACTIVITIES AND ADLS

## 2019-11-14 ASSESSMENT — PAIN DESCRIPTION - LOCATION: LOCATION: LEG

## 2019-11-14 ASSESSMENT — PAIN DESCRIPTION - ONSET: ONSET: ON-GOING

## 2019-11-14 ASSESSMENT — PAIN DESCRIPTION - DESCRIPTORS: DESCRIPTORS: BURNING

## 2019-11-14 ASSESSMENT — PAIN SCALES - GENERAL: PAINLEVEL_OUTOF10: 6

## 2019-11-14 ASSESSMENT — PAIN DESCRIPTION - PROGRESSION: CLINICAL_PROGRESSION: NOT CHANGED

## 2019-11-14 ASSESSMENT — PAIN DESCRIPTION - ORIENTATION: ORIENTATION: LEFT

## 2019-11-21 ENCOUNTER — HOSPITAL ENCOUNTER (OUTPATIENT)
Dept: WOUND CARE | Age: 64
Discharge: HOME | End: 2019-11-21
Payer: OTHER GOVERNMENT

## 2019-11-21 VITALS
HEIGHT: 77 IN | HEART RATE: 93 BPM | RESPIRATION RATE: 18 BRPM | WEIGHT: 315 LBS | DIASTOLIC BLOOD PRESSURE: 67 MMHG | TEMPERATURE: 97.6 F | BODY MASS INDEX: 37.19 KG/M2 | SYSTOLIC BLOOD PRESSURE: 100 MMHG

## 2019-11-21 DIAGNOSIS — I89.0 LYMPHEDEMA: ICD-10-CM

## 2019-11-21 DIAGNOSIS — L97.929 VENOUS ULCER OF LEFT LEG (HCC): ICD-10-CM

## 2019-11-21 DIAGNOSIS — I83.029 VENOUS ULCER OF LEFT LEG (HCC): ICD-10-CM

## 2019-11-21 PROCEDURE — 29581 APPL MULTLAYER CMPRN SYS LEG: CPT

## 2019-11-21 PROCEDURE — 97597 DBRDMT OPN WND 1ST 20 CM/<: CPT

## 2019-11-21 PROCEDURE — 97597 DBRDMT OPN WND 1ST 20 CM/<: CPT | Performed by: CLINICAL NURSE SPECIALIST

## 2019-11-21 RX ORDER — LIDOCAINE 40 MG/G
CREAM TOPICAL ONCE
Status: DISCONTINUED | OUTPATIENT
Start: 2019-11-21 | End: 2019-11-22 | Stop reason: HOSPADM

## 2019-11-21 ASSESSMENT — PAIN - FUNCTIONAL ASSESSMENT: PAIN_FUNCTIONAL_ASSESSMENT: PREVENTS OR INTERFERES SOME ACTIVE ACTIVITIES AND ADLS

## 2019-11-21 ASSESSMENT — PAIN DESCRIPTION - PAIN TYPE: TYPE: NEUROPATHIC PAIN

## 2019-11-21 ASSESSMENT — PAIN DESCRIPTION - LOCATION: LOCATION: LEG

## 2019-11-21 ASSESSMENT — PAIN DESCRIPTION - ORIENTATION: ORIENTATION: LEFT

## 2019-11-21 ASSESSMENT — PAIN DESCRIPTION - DIRECTION: RADIATING_TOWARDS: DENIES

## 2019-11-21 ASSESSMENT — PAIN DESCRIPTION - ONSET: ONSET: ON-GOING

## 2019-11-21 ASSESSMENT — PAIN DESCRIPTION - PROGRESSION: CLINICAL_PROGRESSION: NOT CHANGED

## 2019-11-21 ASSESSMENT — PAIN DESCRIPTION - FREQUENCY: FREQUENCY: INTERMITTENT

## 2019-11-21 ASSESSMENT — PAIN DESCRIPTION - DESCRIPTORS: DESCRIPTORS: ACHING

## 2019-11-21 ASSESSMENT — PAIN SCALES - GENERAL: PAINLEVEL_OUTOF10: 2

## 2019-11-27 ENCOUNTER — HOSPITAL ENCOUNTER (OUTPATIENT)
Dept: WOUND CARE | Age: 64
Discharge: HOME | End: 2019-11-27
Payer: OTHER GOVERNMENT

## 2019-11-27 VITALS
TEMPERATURE: 97.7 F | RESPIRATION RATE: 22 BRPM | HEART RATE: 94 BPM | DIASTOLIC BLOOD PRESSURE: 67 MMHG | HEIGHT: 77 IN | SYSTOLIC BLOOD PRESSURE: 107 MMHG | WEIGHT: 315 LBS | BODY MASS INDEX: 37.19 KG/M2

## 2019-11-27 DIAGNOSIS — L97.929 VENOUS ULCER OF LEFT LEG (HCC): ICD-10-CM

## 2019-11-27 DIAGNOSIS — I83.029 VENOUS ULCER OF LEFT LEG (HCC): ICD-10-CM

## 2019-11-27 DIAGNOSIS — I89.0 LYMPHEDEMA: ICD-10-CM

## 2019-11-27 PROCEDURE — 29581 APPL MULTLAYER CMPRN SYS LEG: CPT

## 2019-11-27 ASSESSMENT — PAIN DESCRIPTION - DESCRIPTORS: DESCRIPTORS: TINGLING

## 2019-11-27 ASSESSMENT — PAIN DESCRIPTION - LOCATION: LOCATION: LEG

## 2019-11-27 ASSESSMENT — PAIN DESCRIPTION - PROGRESSION: CLINICAL_PROGRESSION: NOT CHANGED

## 2019-11-27 ASSESSMENT — PAIN DESCRIPTION - PAIN TYPE: TYPE: CHRONIC PAIN

## 2019-11-27 ASSESSMENT — PAIN DESCRIPTION - FREQUENCY: FREQUENCY: INTERMITTENT

## 2019-11-27 ASSESSMENT — PAIN DESCRIPTION - ORIENTATION: ORIENTATION: LEFT;LOWER

## 2019-11-27 ASSESSMENT — PAIN - FUNCTIONAL ASSESSMENT: PAIN_FUNCTIONAL_ASSESSMENT: ACTIVITIES ARE NOT PREVENTED

## 2019-11-27 ASSESSMENT — PAIN DESCRIPTION - ONSET: ONSET: ON-GOING

## 2019-11-27 ASSESSMENT — PAIN SCALES - GENERAL: PAINLEVEL_OUTOF10: 5

## 2019-12-05 ENCOUNTER — HOSPITAL ENCOUNTER (OUTPATIENT)
Dept: WOUND CARE | Age: 64
Discharge: HOME | End: 2019-12-05
Payer: OTHER GOVERNMENT

## 2019-12-05 VITALS
TEMPERATURE: 97.1 F | RESPIRATION RATE: 20 BRPM | HEART RATE: 110 BPM | WEIGHT: 315 LBS | DIASTOLIC BLOOD PRESSURE: 64 MMHG | SYSTOLIC BLOOD PRESSURE: 119 MMHG | HEIGHT: 77 IN | BODY MASS INDEX: 37.19 KG/M2

## 2019-12-05 DIAGNOSIS — L97.929 VENOUS ULCER OF LEFT LEG (HCC): ICD-10-CM

## 2019-12-05 DIAGNOSIS — I89.0 LYMPHEDEMA: ICD-10-CM

## 2019-12-05 DIAGNOSIS — I83.029 VENOUS ULCER OF LEFT LEG (HCC): ICD-10-CM

## 2019-12-05 PROCEDURE — 29581 APPL MULTLAYER CMPRN SYS LEG: CPT

## 2019-12-05 PROCEDURE — 97597 DBRDMT OPN WND 1ST 20 CM/<: CPT | Performed by: CLINICAL NURSE SPECIALIST

## 2019-12-05 PROCEDURE — 97597 DBRDMT OPN WND 1ST 20 CM/<: CPT

## 2019-12-05 RX ORDER — LIDOCAINE 40 MG/G
CREAM TOPICAL ONCE
Status: DISCONTINUED | OUTPATIENT
Start: 2019-12-05 | End: 2019-12-06 | Stop reason: HOSPADM

## 2019-12-05 RX ORDER — CIPROFLOXACIN 500 MG/1
500 TABLET, FILM COATED ORAL 2 TIMES DAILY
Qty: 14 TABLET | Refills: 0 | Status: SHIPPED | OUTPATIENT
Start: 2019-12-05 | End: 2019-12-12

## 2019-12-05 ASSESSMENT — PAIN SCALES - GENERAL: PAINLEVEL_OUTOF10: 0

## 2019-12-12 ENCOUNTER — HOSPITAL ENCOUNTER (OUTPATIENT)
Dept: WOUND CARE | Age: 64
Discharge: HOME | End: 2019-12-12
Payer: OTHER GOVERNMENT

## 2019-12-12 VITALS
WEIGHT: 315 LBS | TEMPERATURE: 97.1 F | SYSTOLIC BLOOD PRESSURE: 109 MMHG | RESPIRATION RATE: 18 BRPM | HEIGHT: 77 IN | BODY MASS INDEX: 37.19 KG/M2 | HEART RATE: 89 BPM | DIASTOLIC BLOOD PRESSURE: 67 MMHG

## 2019-12-12 DIAGNOSIS — L97.929 VENOUS ULCER OF LEFT LEG (HCC): ICD-10-CM

## 2019-12-12 DIAGNOSIS — I83.029 VENOUS ULCER OF LEFT LEG (HCC): ICD-10-CM

## 2019-12-12 DIAGNOSIS — I89.0 LYMPHEDEMA: ICD-10-CM

## 2019-12-12 PROCEDURE — 29581 APPL MULTLAYER CMPRN SYS LEG: CPT

## 2019-12-12 PROCEDURE — 97597 DBRDMT OPN WND 1ST 20 CM/<: CPT | Performed by: CLINICAL NURSE SPECIALIST

## 2019-12-12 PROCEDURE — 97597 DBRDMT OPN WND 1ST 20 CM/<: CPT

## 2019-12-12 RX ORDER — LIDOCAINE 40 MG/G
CREAM TOPICAL ONCE
Status: DISCONTINUED | OUTPATIENT
Start: 2019-12-12 | End: 2019-12-13 | Stop reason: HOSPADM

## 2019-12-12 ASSESSMENT — PAIN - FUNCTIONAL ASSESSMENT: PAIN_FUNCTIONAL_ASSESSMENT: ACTIVITIES ARE NOT PREVENTED

## 2019-12-12 ASSESSMENT — PAIN DESCRIPTION - PAIN TYPE: TYPE: CHRONIC PAIN

## 2019-12-12 ASSESSMENT — PAIN DESCRIPTION - ORIENTATION: ORIENTATION: LEFT;LOWER

## 2019-12-12 ASSESSMENT — PAIN DESCRIPTION - FREQUENCY: FREQUENCY: INTERMITTENT

## 2019-12-12 ASSESSMENT — PAIN DESCRIPTION - DESCRIPTORS: DESCRIPTORS: SHARP

## 2019-12-12 ASSESSMENT — PAIN DESCRIPTION - ONSET: ONSET: ON-GOING

## 2019-12-12 ASSESSMENT — PAIN DESCRIPTION - LOCATION: LOCATION: LEG

## 2019-12-12 ASSESSMENT — PAIN DESCRIPTION - PROGRESSION: CLINICAL_PROGRESSION: NOT CHANGED

## 2019-12-12 ASSESSMENT — PAIN SCALES - GENERAL: PAINLEVEL_OUTOF10: 5

## 2019-12-19 ENCOUNTER — HOSPITAL ENCOUNTER (OUTPATIENT)
Dept: WOUND CARE | Age: 64
Discharge: HOME | End: 2019-12-19
Payer: COMMERCIAL

## 2019-12-19 VITALS
HEIGHT: 77 IN | HEART RATE: 88 BPM | WEIGHT: 315 LBS | BODY MASS INDEX: 37.19 KG/M2 | DIASTOLIC BLOOD PRESSURE: 84 MMHG | SYSTOLIC BLOOD PRESSURE: 120 MMHG | TEMPERATURE: 97 F | RESPIRATION RATE: 20 BRPM

## 2019-12-19 DIAGNOSIS — I89.0 LYMPHEDEMA: ICD-10-CM

## 2019-12-19 DIAGNOSIS — I83.029 VENOUS ULCER OF LEFT LEG (HCC): ICD-10-CM

## 2019-12-19 DIAGNOSIS — L97.929 VENOUS ULCER OF LEFT LEG (HCC): ICD-10-CM

## 2019-12-19 PROCEDURE — 97597 DBRDMT OPN WND 1ST 20 CM/<: CPT

## 2019-12-19 PROCEDURE — 29581 APPL MULTLAYER CMPRN SYS LEG: CPT

## 2019-12-19 PROCEDURE — 97597 DBRDMT OPN WND 1ST 20 CM/<: CPT | Performed by: CLINICAL NURSE SPECIALIST

## 2019-12-19 RX ORDER — LIDOCAINE HYDROCHLORIDE 40 MG/ML
SOLUTION TOPICAL ONCE
Status: DISCONTINUED | OUTPATIENT
Start: 2019-12-19 | End: 2019-12-20 | Stop reason: HOSPADM

## 2019-12-19 ASSESSMENT — PAIN SCALES - GENERAL: PAINLEVEL_OUTOF10: 0

## 2019-12-26 ENCOUNTER — HOSPITAL ENCOUNTER (OUTPATIENT)
Dept: WOUND CARE | Age: 64
Discharge: HOME | End: 2019-12-26
Payer: OTHER GOVERNMENT

## 2019-12-26 VITALS
HEART RATE: 99 BPM | BODY MASS INDEX: 37.19 KG/M2 | RESPIRATION RATE: 18 BRPM | DIASTOLIC BLOOD PRESSURE: 70 MMHG | TEMPERATURE: 97 F | WEIGHT: 315 LBS | SYSTOLIC BLOOD PRESSURE: 104 MMHG | HEIGHT: 77 IN

## 2019-12-26 DIAGNOSIS — I89.0 LYMPHEDEMA: ICD-10-CM

## 2019-12-26 DIAGNOSIS — I83.029 VENOUS ULCER OF LEFT LEG (HCC): ICD-10-CM

## 2019-12-26 DIAGNOSIS — L97.929 VENOUS ULCER OF LEFT LEG (HCC): ICD-10-CM

## 2019-12-26 PROCEDURE — 97597 DBRDMT OPN WND 1ST 20 CM/<: CPT | Performed by: CLINICAL NURSE SPECIALIST

## 2019-12-26 PROCEDURE — 97597 DBRDMT OPN WND 1ST 20 CM/<: CPT

## 2019-12-26 PROCEDURE — 29581 APPL MULTLAYER CMPRN SYS LEG: CPT

## 2019-12-26 RX ORDER — LIDOCAINE 40 MG/G
CREAM TOPICAL ONCE
Status: DISCONTINUED | OUTPATIENT
Start: 2019-12-26 | End: 2019-12-27 | Stop reason: HOSPADM

## 2019-12-26 ASSESSMENT — PAIN DESCRIPTION - LOCATION: LOCATION: LEG

## 2019-12-26 ASSESSMENT — PAIN DESCRIPTION - PAIN TYPE: TYPE: CHRONIC PAIN

## 2019-12-26 ASSESSMENT — PAIN DESCRIPTION - DESCRIPTORS: DESCRIPTORS: OTHER (COMMENT)

## 2019-12-26 ASSESSMENT — PAIN DESCRIPTION - FREQUENCY: FREQUENCY: CONTINUOUS

## 2019-12-26 ASSESSMENT — PAIN - FUNCTIONAL ASSESSMENT: PAIN_FUNCTIONAL_ASSESSMENT: ACTIVITIES ARE NOT PREVENTED

## 2019-12-26 ASSESSMENT — PAIN DESCRIPTION - PROGRESSION: CLINICAL_PROGRESSION: NOT CHANGED

## 2019-12-26 ASSESSMENT — PAIN DESCRIPTION - ONSET: ONSET: ON-GOING

## 2019-12-26 ASSESSMENT — PAIN DESCRIPTION - ORIENTATION: ORIENTATION: RIGHT;LOWER

## 2020-01-02 ENCOUNTER — HOSPITAL ENCOUNTER (OUTPATIENT)
Dept: WOUND CARE | Age: 65
Discharge: HOME | End: 2020-01-02
Payer: OTHER GOVERNMENT

## 2020-01-02 VITALS
HEIGHT: 77 IN | BODY MASS INDEX: 37.19 KG/M2 | DIASTOLIC BLOOD PRESSURE: 89 MMHG | HEART RATE: 97 BPM | RESPIRATION RATE: 18 BRPM | WEIGHT: 315 LBS | SYSTOLIC BLOOD PRESSURE: 142 MMHG | TEMPERATURE: 97.8 F

## 2020-01-02 PROCEDURE — 97597 DBRDMT OPN WND 1ST 20 CM/<: CPT

## 2020-01-02 PROCEDURE — 97597 DBRDMT OPN WND 1ST 20 CM/<: CPT | Performed by: CLINICAL NURSE SPECIALIST

## 2020-01-02 PROCEDURE — 29581 APPL MULTLAYER CMPRN SYS LEG: CPT

## 2020-01-02 RX ORDER — LEVOFLOXACIN 750 MG/1
750 TABLET ORAL DAILY
COMMUNITY
Start: 2019-12-30 | End: 2020-01-09 | Stop reason: ALTCHOICE

## 2020-01-02 RX ORDER — LIDOCAINE 40 MG/G
CREAM TOPICAL PRN
Status: DISCONTINUED | OUTPATIENT
Start: 2020-01-02 | End: 2020-01-03 | Stop reason: HOSPADM

## 2020-01-02 RX ORDER — METHYLPREDNISOLONE 4 MG/1
4 TABLET ORAL DAILY
COMMUNITY
Start: 2019-12-31 | End: 2020-01-09 | Stop reason: ALTCHOICE

## 2020-01-02 ASSESSMENT — PAIN DESCRIPTION - PAIN TYPE: TYPE: CHRONIC PAIN

## 2020-01-02 ASSESSMENT — PAIN SCALES - GENERAL: PAINLEVEL_OUTOF10: 3

## 2020-01-02 ASSESSMENT — PAIN DESCRIPTION - FREQUENCY: FREQUENCY: CONTINUOUS

## 2020-01-02 ASSESSMENT — PAIN DESCRIPTION - PROGRESSION: CLINICAL_PROGRESSION: NOT CHANGED

## 2020-01-02 ASSESSMENT — PAIN DESCRIPTION - ONSET: ONSET: ON-GOING

## 2020-01-02 ASSESSMENT — PAIN DESCRIPTION - DIRECTION: RADIATING_TOWARDS: DENIES

## 2020-01-02 ASSESSMENT — PAIN DESCRIPTION - LOCATION: LOCATION: LEG

## 2020-01-02 ASSESSMENT — PAIN - FUNCTIONAL ASSESSMENT: PAIN_FUNCTIONAL_ASSESSMENT: PREVENTS OR INTERFERES SOME ACTIVE ACTIVITIES AND ADLS

## 2020-01-02 ASSESSMENT — PAIN DESCRIPTION - DESCRIPTORS: DESCRIPTORS: CONSTANT

## 2020-01-02 ASSESSMENT — PAIN DESCRIPTION - ORIENTATION: ORIENTATION: LEFT

## 2020-01-09 ENCOUNTER — HOSPITAL ENCOUNTER (OUTPATIENT)
Dept: WOUND CARE | Age: 65
Discharge: HOME | End: 2020-01-09
Payer: OTHER GOVERNMENT

## 2020-01-09 VITALS
HEART RATE: 99 BPM | SYSTOLIC BLOOD PRESSURE: 107 MMHG | WEIGHT: 315 LBS | RESPIRATION RATE: 20 BRPM | DIASTOLIC BLOOD PRESSURE: 71 MMHG | BODY MASS INDEX: 37.19 KG/M2 | HEIGHT: 77 IN | TEMPERATURE: 97 F

## 2020-01-09 PROCEDURE — 29581 APPL MULTLAYER CMPRN SYS LEG: CPT

## 2020-01-09 PROCEDURE — 97597 DBRDMT OPN WND 1ST 20 CM/<: CPT

## 2020-01-09 PROCEDURE — 97597 DBRDMT OPN WND 1ST 20 CM/<: CPT | Performed by: CLINICAL NURSE SPECIALIST

## 2020-01-09 RX ORDER — LIDOCAINE 40 MG/G
CREAM TOPICAL ONCE
Status: DISCONTINUED | OUTPATIENT
Start: 2020-01-09 | End: 2020-01-10 | Stop reason: HOSPADM

## 2020-01-09 ASSESSMENT — PAIN - FUNCTIONAL ASSESSMENT: PAIN_FUNCTIONAL_ASSESSMENT: ACTIVITIES ARE NOT PREVENTED

## 2020-01-09 ASSESSMENT — PAIN DESCRIPTION - ONSET: ONSET: ON-GOING

## 2020-01-09 ASSESSMENT — PAIN DESCRIPTION - LOCATION: LOCATION: LEG

## 2020-01-09 ASSESSMENT — PAIN DESCRIPTION - ORIENTATION: ORIENTATION: LEFT;LOWER

## 2020-01-09 ASSESSMENT — PAIN SCALES - GENERAL: PAINLEVEL_OUTOF10: 8

## 2020-01-09 ASSESSMENT — PAIN DESCRIPTION - PROGRESSION: CLINICAL_PROGRESSION: NOT CHANGED

## 2020-01-09 ASSESSMENT — PAIN DESCRIPTION - FREQUENCY: FREQUENCY: CONTINUOUS

## 2020-01-09 ASSESSMENT — PAIN DESCRIPTION - PAIN TYPE: TYPE: CHRONIC PAIN

## 2020-01-09 ASSESSMENT — PAIN DESCRIPTION - DESCRIPTORS: DESCRIPTORS: OTHER (COMMENT)

## 2020-01-09 NOTE — PLAN OF CARE
Patient debrided in [facility]. [facility] and prescription was provided. Patient wound continues to decrease in size. Patient instructed to lose weight and continue elevating legs 3-4 times daily to decrease swelling.

## 2020-01-11 NOTE — PROGRESS NOTES
findings in the wound(s) above. Procedure note:     Consent obtained. Time out performed per [facility] policy. Anesthetic  Anesthetic: 4% Lidocaine Cream     Using a curette, I sharply debrided the left lower leg ulcer(s) down through and including the removal of epidermis and dermis. The type(s) of tissue debrided included fibrin, biofilm and exudate. Total Surface Area Debrided: estimate 20 sq cm. The ulcers were then irrigated with normal saline solution. The procedure was completed with a small amount of bleeding, and hemostasis was with pressure. The patient tolerated the procedure well, with no significant complications. The patient's level of pain during and after the procedure was monitored. Post-debridement measurements, if different from pre-debridement, are in the flowsheet as well. I reminded the patient of the importance of weight management and encouraged ambulation as tolerated, additional lower extremity exercises as instructed in our education sheet, leg elevation when at rest, and compliance with any recommended dietary, diuretic and compression therapies.     Per physician order, left application of multilayer compression wrap was performed in the wound care center today, to help manage edema, stasis dermatitis, and/or venous ulcers. Leave primary dressing and multi-layer wrap(s) in place until the next appointment. Also discussed ways to keep the wrap dry for a shower, including a plastic cast-guard, available in retail pharmacies. He should call before his next visit if there is any significant pain, significant strike-through of drainage to the surface of the wrap, or any significant sense of the wrap sliding down more than an inch or so, bunching-up and abrading his skin.     Discharge plan:     Awaiting new bilateral velcro compression wraps from [facility] to right lower leg daily  When possible, use lymphedema pumps 2 times per day for 1 hour each [facility], notify them of inability to apply lymphedema pumps - is there another type they can provide with zippers? ? - Prescription provided today  Leg elevations 2-3 times per day for 30-40 minutes  Continue ambulation as tolerated  Watch diet, increase protein in diet, try to lose weight    Treatment in the wound care center today, per RN documentation: Wound 09/19/19 #1, left lateral leg, venous, partial thickness, onset 9/2018?-Dressing/Treatment: Other (comment)(Triam/Betty,Fagyl,OpticelAg,ABD,100%castpad,Compri2,Gspanda). Home treatment: good handwashing before and after any dressing changes. Cleanse wound with saline or soap & water before dressing change. May use Vaseline (petrolatum), Aquaphor, Aveeno, CeraVe, Cetaphil, Eucerin, Lubriderm, etc for dry skin. Dressing type for home: As above until nurse visit/wound reassessment 1/13/20. Written discharge instructions given to patient. Follow up in 1 week.     Electronically signed by AIDA Terrazas CNP on 1/11/2020 at 1:58 PM.

## 2020-01-13 ENCOUNTER — HOSPITAL ENCOUNTER (OUTPATIENT)
Dept: WOUND CARE | Age: 65
Discharge: HOME | End: 2020-01-13
Payer: OTHER GOVERNMENT

## 2020-01-13 PROCEDURE — 29581 APPL MULTLAYER CMPRN SYS LEG: CPT

## 2020-01-13 NOTE — PLAN OF CARE
Nurse visit for wound re-assessment. Dressing changed as ordered, patient denies any concerns, patient scheduled to return in 3 days for follow up with provider in [facility]. Discharge instructions reviewed with patient, all questions answered, copy given to patient. Dressings were applied to all wounds per M.D. Instructions at this visit.

## 2020-01-16 ENCOUNTER — HOSPITAL ENCOUNTER (OUTPATIENT)
Dept: WOUND CARE | Age: 65
Discharge: HOME | End: 2020-01-16
Payer: OTHER GOVERNMENT

## 2020-01-16 VITALS
BODY MASS INDEX: 37.19 KG/M2 | HEART RATE: 104 BPM | RESPIRATION RATE: 22 BRPM | DIASTOLIC BLOOD PRESSURE: 68 MMHG | HEIGHT: 77 IN | WEIGHT: 315 LBS | SYSTOLIC BLOOD PRESSURE: 122 MMHG | TEMPERATURE: 96.9 F

## 2020-01-16 PROCEDURE — 97597 DBRDMT OPN WND 1ST 20 CM/<: CPT

## 2020-01-16 PROCEDURE — 97597 DBRDMT OPN WND 1ST 20 CM/<: CPT | Performed by: CLINICAL NURSE SPECIALIST

## 2020-01-16 PROCEDURE — 29581 APPL MULTLAYER CMPRN SYS LEG: CPT

## 2020-01-16 RX ORDER — LIDOCAINE 40 MG/G
CREAM TOPICAL ONCE
Status: DISCONTINUED | OUTPATIENT
Start: 2020-01-16 | End: 2020-01-17 | Stop reason: HOSPADM

## 2020-01-16 ASSESSMENT — PAIN DESCRIPTION - ORIENTATION: ORIENTATION: LEFT;LOWER

## 2020-01-16 ASSESSMENT — PAIN SCALES - GENERAL: PAINLEVEL_OUTOF10: 4

## 2020-01-16 ASSESSMENT — PAIN - FUNCTIONAL ASSESSMENT: PAIN_FUNCTIONAL_ASSESSMENT: ACTIVITIES ARE NOT PREVENTED

## 2020-01-16 ASSESSMENT — PAIN DESCRIPTION - PAIN TYPE: TYPE: CHRONIC PAIN

## 2020-01-16 ASSESSMENT — PAIN DESCRIPTION - FREQUENCY: FREQUENCY: CONTINUOUS

## 2020-01-16 ASSESSMENT — PAIN DESCRIPTION - LOCATION: LOCATION: LEG

## 2020-01-16 ASSESSMENT — PAIN DESCRIPTION - PROGRESSION: CLINICAL_PROGRESSION: NOT CHANGED

## 2020-01-16 ASSESSMENT — PAIN DESCRIPTION - ONSET: ONSET: ON-GOING

## 2020-01-18 NOTE — PROGRESS NOTES
[facility] Progress Note    Tanja Oodm     : 1955    DATE OF VISIT:  2020    Subjective:     Tanja Odom is a 62 y.o. male who has a venous and  lymphedema ulcer located on the left lower leg. Significant symptoms or pertinent wound history since last visit: Less denuded areas to nicci wound. Offers no complaints regarding wound or treatment. Was able to get help applying lymphedema pumps twice this week. He has not discussed zipper type compression pump sleeves as of yet. Received velcro compression wraps. Will educate today and apply to right lower leg. He is working with [employer] to remodeled his bathroom. He is elevating his legs as much as he can. Appetite is good. No fever or chills. No weight loss. Additional ulcer(s) noted? no.     His current medication list consists of Allopurinol, Levothyroxine Sodium, atorvastatin, azelastine, clonazePAM, digoxin, gabapentin, lisinopril, loratadine, metoprolol succinate, montelukast, spironolactone, testosterone, torsemide, vardenafil, and warfarin.     Allergies: Pravastatin    Objective:     Vitals:    20 1404 20 1408   BP:  122/68   Pulse:  104   Resp: 22    Temp: 96.9 °F (36.1 °C)    TempSrc: Oral    Weight: (!) 406 lb 12.8 oz (184.5 kg)    Height: 6' 5\" (1.956 m)      General Appearance: alert and oriented to person, place and time, obese, well developed and well-nourished, in no acute distress  Psychiatric:  Mood and affect appropriate for situation  Skin: warm and dry, no rash  Head: normocephalic and atraumatic  Eyes: pupils equal, round, sclerae anicteric, conjunctivae normal  ENT: no thrush or oral ulcers  Neck:No complaints, normal appearance  Pulmonary/Chest: Respirations easy at rest, no cough or respiratory distress  Cardiovascular: No chest pain, normal rate, toes warm, cap refill normal, JAROCHO: Right: 0.89, Left: 0.96, PT and DP pulse audible with doppler  Abdomen: No nausea or vomiting  Extremities: no cyanosis. Bilateral leg edema and hemosiderin staining. Left lower leg with beginning symptoms of cellulitis with increased pain and erythema  Musculoskeletal: Ambulatory with crutches due to bad knees, moves all extremities, no deformities  Neurologic: distal sensation to light touch intact, no allodynia. Libby-ulcer skin: less denuded and erythematous areas. Ulcer(s): Wound with red granulation and fibrin. Wound bed moist, moderate amount of serous drainage, edges open, irregular and attached. Surrounding tissue and ulcer without signs and symptoms of infection. No purulence, malodor, erythema, increased temperature, or increased pain. Pain 4/10, chronic pain. Photos also saved in electronic chart. Today's wound measurements, per RN documentation:  Wound 09/19/19 #1, left lateral leg, venous, partial thickness, onset 9/2018?-Wound Length (cm): 10.5 cm    Wound 09/19/19 #1, left lateral leg, venous, partial thickness, onset 9/2018?-Wound Width (cm): 4.7 cm    Wound 09/19/19 #1, left lateral leg, venous, partial thickness, onset 9/2018?-Wound Depth (cm): 0.2 cm    Assessment:     Patient Active Problem List   Diagnosis Code    Acute pain of both knees M25.561, M25.562    Primary osteoarthritis of both knees M17.0    [facility] I83.029, L97.929    Lymphedema I89.0       Assessment of today's active condition(s): venous/lymphedema ulcer to left lower leg, slow to heal. Factors contributing to occurrence and/or persistence of the chronic ulcer include edema, venous stasis, lymphedema, chronic pressure, decreased mobility, shear force, obesity and does not elevate his legs as much as he should and inability to use lymphedema pumps without help. Medical necessity of today's visit is shown by the above documentation. Sharp debridement is indicated today, based upon the exam findings in the wound(s) above. Procedure note:     Consent obtained.  Time out performed per [facility] policy. Anesthetic  Anesthetic: 4% Lidocaine Cream     Using a curette, I sharply debrided the left lower leg ulcer(s) down through and including the removal of epidermis and dermis. The type(s) of tissue debrided included fibrin, biofilm and exudate. Total Surface Area Debrided: estimated 20 sq cm. The ulcers were then irrigated with normal saline solution. The procedure was completed with a small amount of bleeding, and hemostasis was with pressure. The patient tolerated the procedure well, with no significant complications. The patient's level of pain during and after the procedure was monitored. Post-debridement measurements, if different from pre-debridement, are in the flowsheet as well. I reminded the patient of the importance of weight management and encouraged ambulation as tolerated, additional lower extremity exercises as instructed in our education sheet, leg elevation when at rest, and compliance with any recommended dietary, diuretic and compression therapies.     Per physician order, left application of multilayer compression wrap was performed in the wound care center today, to help manage edema, stasis dermatitis, and/or venous ulcers. Leave primary dressing and multi-layer wrap(s) in place until the next appointment. Also discussed ways to keep the wrap dry for a shower, including a plastic cast-guard, available in retail pharmacies. He should call before his next visit if there is any significant pain, significant strike-through of drainage to the surface of the wrap, or any significant sense of the wrap sliding down more than an inch or so, bunching-up and abrading his skin. Discharge plan:     Awaiting new bilateral velcro compression wraps from [place], educated and applied to right  When possible, use lymphedema pumps 2 times per day for 1 hour each [facility] notify [facility] of inability to apply lymphedema pumps - is there another type they can provide with zippers? ? - Prescription has been provided   Leg elevations 2-3 times per day for 30-40 minutes  Continue ambulation as tolerated  Watch diet, increase protein in diet, try to lose weight    Treatment in the wound care center today, per RN documentation: Wound 09/19/19 #1, left lateral leg, venous, partial thickness, onset 9/2018?-Dressing/Treatment: (triamcinolone calmoseptine triad opticel ag 4x4's ABD ). Home treatment: good handwashing before and after any dressing changes. Cleanse wound with saline or soap & water before dressing change. May use Vaseline (petrolatum), Aquaphor, Aveeno, CeraVe, Cetaphil, Eucerin, Lubriderm, etc for dry skin. Dressing type for home: As above for the week. Call [address] [place] [address],[address] if dressing slips down for dressing change. Written discharge instructions given to patient. Follow up in 1 week.     Electronically signed by AIDA Collins CNP on 1/18/2020 at 3:00 PM.

## 2020-01-23 ENCOUNTER — HOSPITAL ENCOUNTER (OUTPATIENT)
Dept: WOUND CARE | Age: 65
Discharge: HOME | End: 2020-01-23
Payer: OTHER GOVERNMENT

## 2020-01-23 VITALS
TEMPERATURE: 97.3 F | HEIGHT: 77 IN | DIASTOLIC BLOOD PRESSURE: 65 MMHG | WEIGHT: 315 LBS | BODY MASS INDEX: 37.19 KG/M2 | SYSTOLIC BLOOD PRESSURE: 109 MMHG | RESPIRATION RATE: 21 BRPM | OXYGEN SATURATION: 94 % | HEART RATE: 98 BPM

## 2020-01-23 PROCEDURE — 97597 DBRDMT OPN WND 1ST 20 CM/<: CPT

## 2020-01-23 PROCEDURE — 97597 DBRDMT OPN WND 1ST 20 CM/<: CPT | Performed by: CLINICAL NURSE SPECIALIST

## 2020-01-23 PROCEDURE — 29581 APPL MULTLAYER CMPRN SYS LEG: CPT

## 2020-01-23 RX ORDER — LIDOCAINE 40 MG/G
CREAM TOPICAL ONCE
Status: DISCONTINUED | OUTPATIENT
Start: 2020-01-23 | End: 2020-01-24 | Stop reason: HOSPADM

## 2020-01-23 NOTE — PLAN OF CARE
Wound debridement per APRN,, patient to continue dressing instructions as ordered.   Ontinue treatment to left lower leg venous wound of compri 2, continue to wear velcro compression to RLe, f/u x 1 week with Dr. Jose Acuna, venous duplex ordered, Continue use of compressio pumps BID, elevation of BLE, and increase walking, MD orders/D/C instructions reviewed with patient, all questions answered; copy of instructions given to patient

## 2020-01-25 NOTE — PROGRESS NOTES
crutches due to bad knees, moves all extremities, no deformities  Neurologic: distal sensation to light touch intact, no allodynia. Libby-ulcer skin: denuded, but improving  Ulcer(s): Wounds with red granulation and fibrin. Wound beds moist, moderate amount of serous drainage, edges open and attached. Surrounding tissue and ulcer without signs and symptoms of infection. No purulence, malodor, erythema, increased temperature, or increased pain. Photos also saved in electronic chart. Today's wound measurements, per RN documentation:  Wound 09/19/19 #1, left lateral leg, venous, partial thickness, onset 9/2018?-Wound Length (cm): 10 cm    Wound 09/19/19 #1, left lateral leg, venous, partial thickness, onset 9/2018?-Wound Width (cm): 4.8 cm    Wound 09/19/19 #1, left lateral leg, venous, partial thickness, onset 9/2018?-Wound Depth (cm): 0.2 cm    Assessment:     Patient Active Problem List   Diagnosis Code    Acute pain of both knees M25.561, M25.562    Primary osteoarthritis of both knees M17.0    [facility] leg [facility] I83.029, L97.929    Lymphedema I89.0       Assessment of today's active condition(s): Venous/lymphedema wound cluster to left lower leg, slow to heal. Factors contributing to occurrence and/or persistence of the chronic ulcer include edema, venous stasis, lymphedema, chronic pressure, decreased mobility, shear force, obesity and unable to use lymphedema pumps as prescribed. Medical necessity of today's visit is shown by the above documentation. Sharp debridement is indicated today, based upon the exam findings in the wound(s) above. Procedure note:     Consent obtained. Time out performed per [facility] policy. Anesthetic  Anesthetic: 4% Lidocaine Cream     Using a curette, I sharply debrided the left lower leg ulcer(s) down through and including the removal of epidermis and dermis. The type(s) of tissue debrided included fibrin, biofilm and exudate.  Total Surface Area Debrided:

## 2020-01-28 ENCOUNTER — HOSPITAL ENCOUNTER (OUTPATIENT)
Dept: WOUND CARE | Age: 65
Discharge: HOME | End: 2020-01-28
Payer: OTHER GOVERNMENT

## 2020-01-28 VITALS
RESPIRATION RATE: 21 BRPM | SYSTOLIC BLOOD PRESSURE: 92 MMHG | HEIGHT: 77 IN | TEMPERATURE: 97.7 F | DIASTOLIC BLOOD PRESSURE: 65 MMHG | BODY MASS INDEX: 37.19 KG/M2 | WEIGHT: 315 LBS | HEART RATE: 97 BPM | OXYGEN SATURATION: 97 %

## 2020-01-28 PROBLEM — I73.89 OTHER SPECIFIED PERIPHERAL VASCULAR DISEASES (HCC): Status: ACTIVE | Noted: 2020-01-28

## 2020-01-28 PROBLEM — L97.222 NON-PRESSURE CHRONIC ULCER OF LEFT CALF WITH FAT LAYER EXPOSED (HCC): Status: ACTIVE | Noted: 2020-01-28

## 2020-01-28 PROCEDURE — 11042 DBRDMT SUBQ TIS 1ST 20SQCM/<: CPT

## 2020-01-28 PROCEDURE — 11045 DBRDMT SUBQ TISS EACH ADDL: CPT

## 2020-01-28 PROCEDURE — 29581 APPL MULTLAYER CMPRN SYS LEG: CPT

## 2020-01-28 RX ORDER — LIDOCAINE 40 MG/G
CREAM TOPICAL ONCE
Status: DISCONTINUED | OUTPATIENT
Start: 2020-01-28 | End: 2020-01-29 | Stop reason: HOSPADM

## 2020-01-28 ASSESSMENT — PAIN SCALES - GENERAL
PAINLEVEL_OUTOF10: 0
PAINLEVEL_OUTOF10: 5

## 2020-01-28 ASSESSMENT — PAIN DESCRIPTION - ORIENTATION: ORIENTATION: LEFT

## 2020-01-28 ASSESSMENT — PAIN DESCRIPTION - PROGRESSION: CLINICAL_PROGRESSION: NOT CHANGED

## 2020-01-28 ASSESSMENT — PAIN - FUNCTIONAL ASSESSMENT: PAIN_FUNCTIONAL_ASSESSMENT: ACTIVITIES ARE NOT PREVENTED

## 2020-01-28 ASSESSMENT — PAIN DESCRIPTION - FREQUENCY: FREQUENCY: CONTINUOUS

## 2020-01-28 ASSESSMENT — PAIN DESCRIPTION - ONSET: ONSET: ON-GOING

## 2020-01-28 ASSESSMENT — PAIN DESCRIPTION - PAIN TYPE: TYPE: CHRONIC PAIN

## 2020-01-28 ASSESSMENT — PAIN DESCRIPTION - LOCATION: LOCATION: LEG

## 2020-01-28 NOTE — PROGRESS NOTES
Kaitlyn Sandoval  Progress Note and Procedure Note      Sherri Padilla  AGE: 62 y.o. GENDER: male  : 1955  TODAY'S DATE:  2020    Subjective:     Chief Complaint   Patient presents with    Wound Check         HISTORY of PRESENT ILLNESS HPI     Sherri Padilla is a 62 y.o. male who presents today for wound evaluation. History of Wound: Patient admits to having left leg wound for over 2 years. He admits to having lymphatic pumps for this wound. He states he can only use them 3-4 times a week due to issues getting them on and off. He is currently awaiting seeing if he can get a pair that he could zip up versus strap on. He cannot reach the straps due to his painful knees. He has been getting the bandage placed and leaving it on all week. He is scheduled for vascular studies. He has no other pedal or leg complaints at this time. He denies nausea, vomiting, fever, chills, shortness of breath or chest pain. Wound Pain: Occasional burning  Severity:  3 / 10   Wound Type:  venous, arterial and lymphedema  Modifying Factors:  lymphedema, decreased mobility, arterial insufficiency, decreased tissue oxygenation and non-adherence  Associated Signs/Symptoms:  edema and drainage        PAST MEDICAL HISTORY        Diagnosis Date    Asthma     Atrial fibrillation (HCC)     Diabetes mellitus ([facility] [address]     Hyperlipidemia     Hypertension     Thyroid disease        PAST SURGICAL HISTORY    Past Surgical History:   Procedure Laterality Date    ABLATION OF DYSRHYTHMIC FOCUS   AND 2009    TIMES TWO    CARDIAC CATHETERIZATION Left 2015    CARDIAC DEFIBRILLATOR PLACEMENT  2016       FAMILY HISTORY    Family History   Problem Relation Age of Onset    Cancer Mother     Cancer Father        SOCIAL HISTORY    Social History     Tobacco Use    Smoking status: Never Smoker    Smokeless tobacco: Former User     Types: Snuff   Substance Use Topics    Alcohol use: No    Drug use:  No pedal pulses, right DP0/4 and PT0/4, left DP0/4 and PT0/4. CFT 6 seconds digits 1 to 5 bilateral.  Hair growthAbsent  both lower extremities and feet. Skin temperature is warm to warm from pretibial area to distal digits bilateral.  Exam is negative for rubor, pallor, cyanosis or signs of acute vascular compromise bilaterally. Exam is positive for edema bilateral lower extremity. Varicosities Present bilateral lower extremity. Neuro: Neurologic status diminished bilateral with epicritic Present , proprioceptive Present, and protopathicPresent. DTRs Present bilateral Achilles. There were no reproducible neuritic symptoms on exam bilateral feet/ankles. Derm: Ulceration to left lateral leg. Ecchymosis Absent  bilateral feet/foot. Musculoskeletal: No pain with sharp debridement of wound 5/5 muscle strength in/eversion and dorsi/plantarflexion bilateral feet. No gross instability noted. Assessment:     Patient Active Problem List   Diagnosis    Acute pain of both knees    Primary osteoarthritis of both knees    Venous ulcer of left leg ([facility] [address]    Lymphedema    Other specified peripheral vascular diseases ([facility] [address]    Non-pressure chronic ulcer of left calf with fat layer exposed ([facility] [address]       Procedure Note    Performed by: Latoya Carranza DPM    Consent obtained: Yes    Time out taken:  Yes    Pain Control: Anesthetic  Anesthetic: 4% Lidocaine Cream     Debridement:Excisional Debridement    Using curette the wound was sharply debrided    down through and including the removal of epidermis, dermis and subcutaneous tissue. Devitalized Tissue Debrided:  fibrin, biofilm, slough, necrotic/eschar and exudate    Pre Debridement Measurements:  Are located in the Wound Documentation Flow Sheet    Post  Debridement Measurements:  Wound 09/19/19 #1, left lateral leg, venous, full thickness, onset 9/2018?  (Active)   Wound Image   1/2/2020  1:40 PM   Wound Venous 1/28/2020  8:50 AM   Dressing Status Clean;Dry; Intact 1/23/2020  3:21 PM   Dressing Changed Changed/New 1/23/2020  3:21 PM   Wound Cleansed Rinsed/Irrigated with saline; Wound cleanser 1/28/2020  8:50 AM   Wound Length (cm) 10.5 cm 1/28/2020  8:50 AM   Wound Width (cm) 3 cm 1/28/2020  8:50 AM   Wound Depth (cm) 0.2 cm 1/28/2020  8:50 AM   Wound Surface Area (cm^2) 31.5 cm^2 1/28/2020  8:50 AM   Change in Wound Size % (l*w) -447.83 1/28/2020  8:50 AM   Wound Volume (cm^3) 6.3 cm^3 1/28/2020  8:50 AM   Wound Healing % -448 1/28/2020  8:50 AM   Post-Procedure Length (cm) 10.5 cm 1/28/2020  9:10 AM   Post-Procedure Width (cm) 3 cm 1/28/2020  9:10 AM   Post-Procedure Depth (cm) 0.2 cm 1/28/2020  9:10 AM   Post-Procedure Surface Area (cm^2) 31.5 cm^2 1/28/2020  9:10 AM   Post-Procedure Volume (cm^3) 6.3 cm^3 1/28/2020  9:10 AM   Distance Tunneling (cm) 0 cm 1/28/2020  8:50 AM   Tunneling Position ___ O'Clock 0 1/28/2020  8:50 AM   Undermining Starts ___ O'Clock 0 1/28/2020  8:50 AM   Undermining Ends___ O'Clock 0 1/28/2020  8:50 AM   Undermining Maxium Distance (cm) 0 1/28/2020  8:50 AM   Wound Assessment Red;Yellow 1/28/2020  8:50 AM   Drainage Amount Moderate 1/28/2020  8:50 AM   Drainage Description Serosanguinous 1/28/2020  8:50 AM   Odor None 1/28/2020  8:50 AM   Margins Defined edges 1/28/2020  8:50 AM   Libby-wound Assessment Yellow-brown 1/28/2020  8:50 AM   Number of days: 130           Total Surface Area Debrided:  31.5 sq cm     Percentage of wound debrided 100%    Bleeding:  Minimal    Hemostasis Achieved:  by pressure    Procedural Pain:  0  / 10     Post Procedural Pain:  0 / 10     Response to treatment:  Well tolerated by patient.            Plan:   Patient examined and evaluated  Wound debrided without incident  Discussed appropriate wound treatment including leg elevation and use of compression pumps  [facility]  Keep lEgs elevated all times and not active  Vascular studies ordered Follow-up in 1 week  The nature of the patient's condition was explained in depth.  The patient was informed that their compliance to the treatment plan is paramount to successful healing and prevention of further ulceration and/or infection     Discharge Treatment  Compression dressing    Written Patient Discharge Instructions Given            Electronically signed by Shalini Norwood DPM on 1/28/2020 at 9:40 AM

## 2020-01-28 NOTE — PLAN OF CARE
Pt seen in [facility] - f/u today  as initial visit with Dr. Marta Corbin - amberlye per Dr. Marta Corbin - Reviewed JAROCHO -pt has ordered Venous studies for next week - changed testing to evaluate arterial status - scheduled for 2-7-2020- will cont current treatment  with triamcinalone , triad , flagyl, silver alginate - compri 2 for compression - reviewed AVS- stressed elevation - compression pumps , weight loss - f/u in 1 week

## 2020-01-31 ENCOUNTER — HOSPITAL ENCOUNTER (OUTPATIENT)
Dept: VASCULAR LAB | Age: 65
Discharge: HOME | End: 2020-01-31
Payer: OTHER GOVERNMENT

## 2020-01-31 ENCOUNTER — HOSPITAL ENCOUNTER (OUTPATIENT)
Dept: WOUND CARE | Age: 65
Discharge: HOME | End: 2020-01-31
Payer: OTHER GOVERNMENT

## 2020-01-31 VITALS
TEMPERATURE: 97.7 F | HEIGHT: 77 IN | SYSTOLIC BLOOD PRESSURE: 130 MMHG | BODY MASS INDEX: 37.19 KG/M2 | RESPIRATION RATE: 18 BRPM | DIASTOLIC BLOOD PRESSURE: 84 MMHG | WEIGHT: 315 LBS | HEART RATE: 85 BPM

## 2020-01-31 PROCEDURE — 93925 LOWER EXTREMITY STUDY: CPT

## 2020-01-31 PROCEDURE — 29581 APPL MULTLAYER CMPRN SYS LEG: CPT

## 2020-01-31 ASSESSMENT — PAIN SCALES - GENERAL
PAINLEVEL_OUTOF10: 0
PAINLEVEL_OUTOF10: 0

## 2020-02-04 ENCOUNTER — HOSPITAL ENCOUNTER (OUTPATIENT)
Dept: WOUND CARE | Age: 65
Discharge: HOME | End: 2020-02-04
Payer: OTHER GOVERNMENT

## 2020-02-04 VITALS
WEIGHT: 315 LBS | HEART RATE: 94 BPM | DIASTOLIC BLOOD PRESSURE: 68 MMHG | BODY MASS INDEX: 37.19 KG/M2 | SYSTOLIC BLOOD PRESSURE: 99 MMHG | HEIGHT: 77 IN | RESPIRATION RATE: 21 BRPM | TEMPERATURE: 97.8 F

## 2020-02-04 PROCEDURE — 29581 APPL MULTLAYER CMPRN SYS LEG: CPT

## 2020-02-04 PROCEDURE — 11045 DBRDMT SUBQ TISS EACH ADDL: CPT

## 2020-02-04 PROCEDURE — 11042 DBRDMT SUBQ TIS 1ST 20SQCM/<: CPT

## 2020-02-04 RX ORDER — CLINDAMYCIN HYDROCHLORIDE 300 MG/1
300 CAPSULE ORAL 3 TIMES DAILY
Qty: 30 CAPSULE | Refills: 0 | Status: SHIPPED | OUTPATIENT
Start: 2020-02-04 | End: 2020-02-18

## 2020-02-04 ASSESSMENT — PAIN - FUNCTIONAL ASSESSMENT: PAIN_FUNCTIONAL_ASSESSMENT: ACTIVITIES ARE NOT PREVENTED

## 2020-02-04 ASSESSMENT — PAIN DESCRIPTION - FREQUENCY: FREQUENCY: CONTINUOUS

## 2020-02-04 ASSESSMENT — PAIN DESCRIPTION - DESCRIPTORS: DESCRIPTORS: OTHER (COMMENT)

## 2020-02-04 ASSESSMENT — PAIN DESCRIPTION - PROGRESSION: CLINICAL_PROGRESSION: NOT CHANGED

## 2020-02-04 ASSESSMENT — PAIN DESCRIPTION - ONSET: ONSET: SUDDEN

## 2020-02-04 ASSESSMENT — PAIN DESCRIPTION - ORIENTATION: ORIENTATION: LEFT

## 2020-02-04 ASSESSMENT — PAIN DESCRIPTION - LOCATION: LOCATION: LEG

## 2020-02-04 ASSESSMENT — PAIN DESCRIPTION - PAIN TYPE: TYPE: ACUTE PAIN

## 2020-02-04 ASSESSMENT — PAIN SCALES - GENERAL: PAINLEVEL_OUTOF10: 2

## 2020-02-04 NOTE — PROGRESS NOTES
Kaitlyn Sandoval  Progress Note and Procedure Note      Lilia Walters  AGE: 62 y.o. GENDER: male  : 1955  TODAY'S DATE:  2020    Subjective:     Chief Complaint   Patient presents with    Wound Check         HISTORY of PRESENT ILLNESS HPI     Lilia Walters is a 62 y.o. male who presents today for wound evaluation. History of Wound: Patient admits to having left leg wound for over 2 years. He admits to having lymphatic pumps for this wound. He states he can only use them 3-4 times a week due to issues getting them on and off. He has been trying to keep his legs elevated. He did not get any new pumps yet. He did get the vascular studies completed since his last visit. He has no other pedal or leg complaints at this time. He denies nausea, vomiting, fever, chills, shortness of breath or chest pain.   Wound Pain: Occasional burning  Severity:  3 / 10   Wound Type:  venous, arterial and lymphedema  Modifying Factors:  lymphedema, decreased mobility, arterial insufficiency, decreased tissue oxygenation and non-adherence  Associated Signs/Symptoms:  edema and drainage        PAST MEDICAL HISTORY        Diagnosis Date    Asthma     Atrial fibrillation ([address]     Diabetes mellitus ([facility] [address]     Hyperlipidemia     Hypertension     Thyroid disease        PAST SURGICAL HISTORY    Past Surgical History:   Procedure Laterality Date    ABLATION OF DYSRHYTHMIC FOCUS   AND     TIMES TWO    CARDIAC CATHETERIZATION Left 2015    CARDIAC DEFIBRILLATOR PLACEMENT  2016       FAMILY HISTORY    Family History   Problem Relation Age of Onset    Cancer Mother     Cancer Father        SOCIAL HISTORY    Social History     Tobacco Use    Smoking status: Never Smoker    Smokeless tobacco: Former User     Types: Snuff   Substance Use Topics    Alcohol use: No    Drug use: No       ALLERGIES    Allergies   Allergen Reactions    Pravastatin      Patient does recall reaction MEDICATIONS    Current Outpatient Medications on File Prior to Encounter   Medication Sig Dispense Refill    ALLOPURINOL PO Take 100 mg by mouth daily       testosterone (ANDROGEL; TESTIM) 50 MG/5GM (1%) GEL 1% gel Apply topically daily.  spironolactone (ALDACTONE) 25 MG tablet Take 25 mg by mouth daily      atorvastatin (LIPITOR) 10 MG tablet Take 10 mg by mouth daily       clonazePAM (KLONOPIN) 1 MG tablet Take one-half tablet by mouth twice a day as needed for anxiety      digoxin (LANOXIN) 250 MCG tablet Take 125 mcg by mouth daily       gabapentin (NEURONTIN) 300 MG capsule Take 300 mg by mouth 3 times daily.  Levothyroxine Sodium 112 MCG CAPS Take 112 mcg by mouth daily       lisinopril (PRINIVIL;ZESTRIL) 40 MG tablet Take 40 mg by mouth daily       loratadine (CLARITIN) 10 MG tablet Take 10 mg by mouth daily       metoprolol succinate (TOPROL XL) 50 MG extended release tablet Take 200 mg by mouth daily       montelukast (SINGULAIR) 10 MG tablet Take 10 mg by mouth nightly       torsemide (DEMADEX) 20 MG tablet Take 20 mg by mouth daily       vardenafil (LEVITRA) 20 MG tablet Take 1/2 tablet by mouth one hour before sexual activity      warfarin (COUMADIN) 5 MG tablet 5 mg Indications: 2 5mg tablet every day but wednesday and on wednesday take 12.5 mg       azelastine (ASTELIN) 0.1 % nasal spray 1 spray by Nasal route 2 times daily Use in each nostril as directed       No current facility-administered medications on file prior to encounter. REVIEW OF SYSTEMS    Pertinent items are noted in HPI. Objective:      BP 99/68 Comment: asymptomatic  Pulse 94   Temp 97.8 °F (36.6 °C) (Oral)   Resp 21   Ht 6' 5\" (1.956 m)   Wt (!) 410 lb 12.8 oz (186.3 kg)   BMI 48.71 kg/m²     PHYSICAL EXAM    Vascular: Vascular status Impaired  palpable pedal pulses, right DP1/4 and PT1/4, left DP1/4 and PT1/4.   CFT 4 seconds digits 1 to 5 bilateral.  Hair growth Absent  both lower Wound Length (cm) 11 cm 2/4/2020 10:55 AM   Wound Width (cm) 4.9 cm 2/4/2020 10:55 AM   Wound Depth (cm) 0.3 cm 2/4/2020 10:55 AM   Wound Surface Area (cm^2) 53.9 cm^2 2/4/2020 10:55 AM   Change in Wound Size % (l*w) -837.39 2/4/2020 10:55 AM   Wound Volume (cm^3) 16.17 cm^3 2/4/2020 10:55 AM   Wound Healing % -1306 2/4/2020 10:55 AM   Post-Procedure Length (cm) 10.5 cm 1/28/2020  9:10 AM   Post-Procedure Width (cm) 3 cm 1/28/2020  9:10 AM   Post-Procedure Depth (cm) 0.2 cm 1/28/2020  9:10 AM   Post-Procedure Surface Area (cm^2) 31.5 cm^2 1/28/2020  9:10 AM   Post-Procedure Volume (cm^3) 6.3 cm^3 1/28/2020  9:10 AM   Distance Tunneling (cm) 0 cm 2/4/2020 10:55 AM   Tunneling Position ___ O'Clock 0 2/4/2020 10:55 AM   Undermining Starts ___ O'Clock 0 2/4/2020 10:55 AM   Undermining Ends___ O'Clock 0 2/4/2020 10:55 AM   Undermining Maxium Distance (cm) 0 2/4/2020 10:55 AM   Wound Assessment Red 2/4/2020 10:55 AM   Drainage Amount Small 2/4/2020 10:55 AM   Drainage Description Serosanguinous 2/4/2020 10:55 AM   Odor None 2/4/2020 10:55 AM   Margins Defined edges 2/4/2020 10:55 AM   Libby-wound Assessment Yellow-brown 2/4/2020 10:55 AM   Number of days: 138         Total Surface Area Debrided:  30 sq cm     Percentage of wound debrided 100%    Bleeding:  Minimal    Hemostasis Achieved:  by pressure    Procedural Pain:  0  / 10     Post Procedural Pain:  0 / 10     Response to treatment:  Well tolerated by patient.            Plan:   Patient examined and evaluated  Wound debrided without incident  Also has improved today may be due to decreased edema  Discussed appropriate wound treatment including leg elevation and use of compression pumps  [facility]  Keep legs elevated all times and not active  Vascular studies ordered Venous duplex  Arterial duplex show blood flow adequate for healing  Patient admits to pain that is consistent with previous infections, prescription for Clindamycin 300 mg 3 times a day for 10 days  Follow-up in 1 week  The nature of the patient's condition was explained in depth.  The patient was informed that their compliance to the treatment plan is paramount to successful healing and prevention of further ulceration and/or infection     Discharge Treatment Wound 09/19/19 #1, left lateral leg, venous, full thickness, onset 9/2018?-Dressing/Treatment: (triamcinlone,calmoseptine,gentamican,gauze, ABD, comp 2 )Compression dressing    Written Patient Discharge Instructions Given            Electronically signed by Latoya Carranza DPM on 2/4/2020 at 3:58 PM

## 2020-02-04 NOTE — PLAN OF CARE
Pt seen in [facility] - wound larger and more reddness / drainage this week - treatment changed to gentamycin cream and collagen w/o ag   after debridement per Dr. Jasmyne Camacho - also ordered Clindamycin  orally - Pt needed to be scheduled for venous studies - arterial studies WNL last week - Pt reports he has appt at [place] - will inquire about new compression sleeves - reviewed AVS - f/u in 1 week - will cont compression w/ compri 2

## 2020-02-04 NOTE — PROGRESS NOTES
Patient reports pain developing over past 2 days in wound radiating down towards ankle. This is a burning, constant pain. In the past, when patient developed this acute pain in wound, he had infection. Discussed with Dr. Noel Wagner.

## 2020-02-05 ENCOUNTER — HOSPITAL ENCOUNTER (EMERGENCY)
Age: 65
Discharge: HOME | End: 2020-02-05
Attending: EMERGENCY MEDICINE
Payer: OTHER GOVERNMENT

## 2020-02-05 VITALS
HEART RATE: 87 BPM | SYSTOLIC BLOOD PRESSURE: 99 MMHG | BODY MASS INDEX: 37.19 KG/M2 | RESPIRATION RATE: 18 BRPM | OXYGEN SATURATION: 95 % | WEIGHT: 315 LBS | DIASTOLIC BLOOD PRESSURE: 62 MMHG | TEMPERATURE: 97.8 F | HEIGHT: 77 IN

## 2020-02-05 LAB
A/G RATIO: 1.3 (ref 1.1–2.2)
ALBUMIN SERPL-MCNC: 4 G/DL (ref 3.4–5)
ALP BLD-CCNC: 64 U/L (ref 40–129)
ALT SERPL-CCNC: 31 U/L (ref 10–40)
ANION GAP SERPL CALCULATED.3IONS-SCNC: 13 MMOL/L (ref 3–16)
AST SERPL-CCNC: 32 U/L (ref 15–37)
BASOPHILS ABSOLUTE: 0.1 K/UL (ref 0–0.2)
BASOPHILS RELATIVE PERCENT: 1.1 %
BILIRUB SERPL-MCNC: 0.9 MG/DL (ref 0–1)
BUN BLDV-MCNC: 23 MG/DL (ref 7–20)
CALCIUM SERPL-MCNC: 9.7 MG/DL (ref 8.3–10.6)
CHLORIDE BLD-SCNC: 100 MMOL/L (ref 99–110)
CO2: 27 MMOL/L (ref 21–32)
CREAT SERPL-MCNC: 1.1 MG/DL (ref 0.8–1.3)
EOSINOPHILS ABSOLUTE: 0.2 K/UL (ref 0–0.6)
EOSINOPHILS RELATIVE PERCENT: 2.7 %
GFR AFRICAN AMERICAN: >60
GFR NON-AFRICAN AMERICAN: >60
GLOBULIN: 3.2 G/DL
GLUCOSE BLD-MCNC: 108 MG/DL (ref 70–99)
HCT VFR BLD CALC: 40.6 % (ref 40.5–52.5)
HEMOGLOBIN: 13.4 G/DL (ref 13.5–17.5)
LYMPHOCYTES ABSOLUTE: 2.5 K/UL (ref 1–5.1)
LYMPHOCYTES RELATIVE PERCENT: 32.3 %
MCH RBC QN AUTO: 31 PG (ref 26–34)
MCHC RBC AUTO-ENTMCNC: 33.1 G/DL (ref 31–36)
MCV RBC AUTO: 93.5 FL (ref 80–100)
MONOCYTES ABSOLUTE: 0.7 K/UL (ref 0–1.3)
MONOCYTES RELATIVE PERCENT: 8.4 %
NEUTROPHILS ABSOLUTE: 4.3 K/UL (ref 1.7–7.7)
NEUTROPHILS RELATIVE PERCENT: 55.5 %
PDW BLD-RTO: 14.8 % (ref 12.4–15.4)
PLATELET # BLD: 169 K/UL (ref 135–450)
PMV BLD AUTO: 9.9 FL (ref 5–10.5)
POTASSIUM SERPL-SCNC: 4 MMOL/L (ref 3.5–5.1)
RBC # BLD: 4.34 M/UL (ref 4.2–5.9)
SODIUM BLD-SCNC: 140 MMOL/L (ref 136–145)
TOTAL PROTEIN: 7.2 G/DL (ref 6.4–8.2)
WBC # BLD: 7.8 K/UL (ref 4–11)

## 2020-02-05 PROCEDURE — 80053 COMPREHEN METABOLIC PANEL: CPT

## 2020-02-05 PROCEDURE — 36415 COLL VENOUS BLD VENIPUNCTURE: CPT

## 2020-02-05 PROCEDURE — 99284 EMERGENCY DEPT VISIT MOD MDM: CPT

## 2020-02-05 PROCEDURE — 93005 ELECTROCARDIOGRAM TRACING: CPT | Performed by: EMERGENCY MEDICINE

## 2020-02-05 PROCEDURE — 85025 COMPLETE CBC W/AUTO DIFF WBC: CPT

## 2020-02-05 ASSESSMENT — ENCOUNTER SYMPTOMS
ABDOMINAL PAIN: 0
SHORTNESS OF BREATH: 0
BACK PAIN: 0

## 2020-02-05 NOTE — ED PROVIDER NOTES
cardiomyopathy  Patient has history of atrial fib  History of obesity  History of cardiomyopathy  History of gout on allopurinol  History of diabetes  History of being anticoagulated on Coumadin  History of being on metoprolol for rate control  He has been shocked before when his atrial fibs become uncontrolled  Except as noted above the remainder of the review of systems was reviewed and negative. PAST MEDICAL HISTORY     Past Medical History:   Diagnosis Date    Asthma     Atrial fibrillation ([address]     Diabetes mellitus ([facility] [address]     Hyperlipidemia     Hypertension     Thyroid disease          SURGICAL HISTORY       Past Surgical History:   Procedure Laterality Date    ABLATION OF DYSRHYTHMIC FOCUS  2009 AND 2009    TIMES TWO    CARDIAC CATHETERIZATION Left 2015    CARDIAC DEFIBRILLATOR PLACEMENT  2016         CURRENT MEDICATIONS       Discharge Medication List as of 2/5/2020 12:25 PM      CONTINUE these medications which have NOT CHANGED    Details   clindamycin (CLEOCIN) 300 MG capsule Take 1 capsule by mouth 3 times daily for 10 days, Disp-30 capsule, R-0Normal      ALLOPURINOL PO Take 100 mg by mouth daily Historical Med      testosterone (ANDROGEL; TESTIM) 50 MG/5GM (1%) GEL 1% gel Apply topically daily. Historical Med      spironolactone (ALDACTONE) 25 MG tablet Take 25 mg by mouth dailyHistorical Med      atorvastatin (LIPITOR) 10 MG tablet Take 10 mg by mouth daily Historical Med      clonazePAM (KLONOPIN) 1 MG tablet Take one-half tablet by mouth twice a day as needed for anxietyHistorical Med      digoxin (LANOXIN) 250 MCG tablet Take 125 mcg by mouth daily Historical Med      gabapentin (NEURONTIN) 300 MG capsule Take 300 mg by mouth 3 times daily.  Historical Med      Levothyroxine Sodium 112 MCG CAPS Take 112 mcg by mouth daily Historical Med      lisinopril (PRINIVIL;ZESTRIL) 40 MG tablet Take 40 mg by mouth daily Historical Med      loratadine (CLARITIN) 10 MG tablet Take 10 mg by mouth mg/dL    Total Protein 7.2 6.4 - 8.2 g/dL    Alb 4.0 3.4 - 5.0 g/dL    Albumin/Globulin Ratio 1.3 1.1 - 2.2    Total Bilirubin 0.9 0.0 - 1.0 mg/dL    Alkaline Phosphatase 64 40 - 129 U/L    ALT 31 10 - 40 U/L    AST 32 15 - 37 U/L    Globulin 3.2 g/dL   EKG 12 Lead   Result Value Ref Range    Ventricular Rate 97 BPM    Atrial Rate 102 BPM    QRS Duration 110 ms    Q-T Interval 372 ms    QTc Calculation (Bazett) 472 ms    R Axis 77 degrees    T Axis 269 degrees    Diagnosis       Atrial fibrillation Controlled ventricular rateST & T wave abnormality, consider inferolateral ischemia or digitalis effectProlonged QTAbnormal ECGNo significant change was foundWhen compared with ECG [id number] by IVANA ANSARI, RAJ ([id number]) on 2/6/2020 4:56:54 AM            EMERGENCY DEPARTMENT COURSE and DIFFERENTIAL DIAGNOSIS/MDM:     Vitals:    02/05/20 1011 02/05/20 1133   BP: 114/73 99/62   Pulse: 98 87   Resp: 27 18   Temp: 97.8 °F (36.6 °C)    TempSrc: Oral    SpO2: 98% 95%   Weight: (!) 410 lb (186 kg)    Height: 6' 5\" (1.956 m)            MDM      REASSESSMENT      The time of my second reassessment his systolic blood pressure is 120  It is felt since he is not bradycardic that I would not make any changes in his medications I recommend just keeping him the same at this point he still denies no chest pain no shortness of breath no no symptoms whatsoever so therefore at this point without fever without chills without severe bradycardia is sent home advised to continue his regular medications and of course return if any worsening  CRITICAL CARE TIME     CONSULTS:  None      PROCEDURES:     Procedures    MEDICATIONS GIVEN THIS VISIT:  Medications - No data to display     FINAL IMPRESSION      1. EKG, abnormal            DISPOSITION/PLAN   DISPOSITION Decision To Discharge 02/05/2020 12:19:39 PM      PATIENT REFERRED TO:  [facility]angelo Deyvi  [address] [facility] [address]   [phone]      As needed    [place]  [facility] [address] [facility]  [phone]    If symptoms worsen      DISCHARGE MEDICATIONS:  Discharge Medication List as of 2/5/2020 12:25 PM          Controlled Substances Monitoring  No flowsheet data found. (Please note that portions of this note were completed with a voice recognition program.  Efforts were made to edit the dictations but occasionally words are mis-transcribed.)    Patient was advised to return to the Emergency Department if there was any worsening.     Gale Damico MD (electronically signed)  Attending Emergency Physician         Lindsey Berumen MD  02/10/20 1852

## 2020-02-06 LAB
EKG ATRIAL RATE: 102 BPM
EKG DIAGNOSIS: NORMAL
EKG Q-T INTERVAL: 372 MS
EKG QRS DURATION: 110 MS
EKG QTC CALCULATION (BAZETT): 472 MS
EKG R AXIS: 77 DEGREES
EKG T AXIS: 269 DEGREES
EKG VENTRICULAR RATE: 97 BPM

## 2020-02-06 PROCEDURE — 93010 ELECTROCARDIOGRAM REPORT: CPT | Performed by: INTERNAL MEDICINE

## 2020-02-11 ENCOUNTER — HOSPITAL ENCOUNTER (OUTPATIENT)
Dept: WOUND CARE | Age: 65
Discharge: HOME | End: 2020-02-11
Payer: COMMERCIAL

## 2020-02-11 VITALS
HEART RATE: 85 BPM | WEIGHT: 315 LBS | TEMPERATURE: 97.8 F | DIASTOLIC BLOOD PRESSURE: 80 MMHG | BODY MASS INDEX: 37.19 KG/M2 | HEIGHT: 77 IN | RESPIRATION RATE: 16 BRPM | SYSTOLIC BLOOD PRESSURE: 113 MMHG

## 2020-02-11 PROCEDURE — 11042 DBRDMT SUBQ TIS 1ST 20SQCM/<: CPT

## 2020-02-11 PROCEDURE — 11045 DBRDMT SUBQ TISS EACH ADDL: CPT

## 2020-02-11 PROCEDURE — 29581 APPL MULTLAYER CMPRN SYS LEG: CPT

## 2020-02-11 RX ORDER — LIDOCAINE 40 MG/G
CREAM TOPICAL ONCE
Status: DISCONTINUED | OUTPATIENT
Start: 2020-02-11 | End: 2020-02-12 | Stop reason: HOSPADM

## 2020-02-11 ASSESSMENT — PAIN SCALES - GENERAL: PAINLEVEL_OUTOF10: 0

## 2020-02-11 NOTE — PLAN OF CARE
Pt seen in [facility] - wound slow to improve -- small change in measurement - Debride per Dr. Everton Lee- Treatment changed slightly - will apply collagen first  then gentamycin cream to wound - nicci wound reddness improved - dc'd triamcinalone - added lachydrin to dry skin especially feet - will cont compri 2 for compression - encouraged elevation and compression pumps . Noted pt weight increased 4# this week - reviewed AVS and encouraged compliance - f/u in 1 week - will do wound reassessment  Friday for dressing change.

## 2020-02-11 NOTE — PROGRESS NOTES
Kaitlyn Sandoval  Progress Note and Procedure Note      Cy Mascorro  AGE: 62 y.o. GENDER: male  : 1955  TODAY'S DATE:  2020    Subjective:     Chief Complaint   Patient presents with    Wound Check         HISTORY of PRESENT ILLNESS HPI     Cy Mascorro is a 62 y.o. male who presents today for wound evaluation. History of Wound: Patient admits to having left leg wound for over 2 years. He has left the bandages intact since his last visit. He admits that it slipped down on the left leg. He denies nausea, vomiting, fever, chills, shortness of breath or chest pain.   Wound Pain: Occasional burning  Severity:  3 / 10   Wound Type:  venous, arterial and lymphedema  Modifying Factors:  lymphedema, decreased mobility, arterial insufficiency, decreased tissue oxygenation and non-adherence  Associated Signs/Symptoms:  edema and drainage        PAST MEDICAL HISTORY        Diagnosis Date    Asthma     Atrial fibrillation ([facility]     Diabetes mellitus ([facility]     Hyperlipidemia     Hypertension     Thyroid disease        PAST SURGICAL HISTORY    Past Surgical History:   Procedure Laterality Date    ABLATION OF DYSRHYTHMIC FOCUS   AND     TIMES TWO    CARDIAC CATHETERIZATION Left 2015    CARDIAC DEFIBRILLATOR PLACEMENT  2016       FAMILY HISTORY    Family History   Problem Relation Age of Onset    Cancer Mother     Cancer Father        SOCIAL HISTORY    Social History     Tobacco Use    Smoking status: Never Smoker    Smokeless tobacco: Former User     Types: Snuff   Substance Use Topics    Alcohol use: No    Drug use: No       ALLERGIES    Allergies   Allergen Reactions    Pravastatin      Patient does recall reaction       MEDICATIONS    Current Outpatient Medications on File Prior to Encounter   Medication Sig Dispense Refill    clindamycin (CLEOCIN) 300 MG capsule Take 1 capsule by mouth 3 times daily for 10 days 30 capsule 0    ALLOPURINOL PO Take 100 mg by mouth daily       testosterone (ANDROGEL; TESTIM) 50 MG/5GM (1%) GEL 1% gel Apply topically daily.  spironolactone (ALDACTONE) 25 MG tablet Take 25 mg by mouth daily      atorvastatin (LIPITOR) 10 MG tablet Take 10 mg by mouth daily       clonazePAM (KLONOPIN) 1 MG tablet Take one-half tablet by mouth twice a day as needed for anxiety      digoxin (LANOXIN) 250 MCG tablet Take 125 mcg by mouth daily       gabapentin (NEURONTIN) 300 MG capsule Take 300 mg by mouth 3 times daily.  Levothyroxine Sodium 112 MCG CAPS Take 112 mcg by mouth daily       lisinopril (PRINIVIL;ZESTRIL) 40 MG tablet Take 30 mg by mouth daily       loratadine (CLARITIN) 10 MG tablet Take 10 mg by mouth daily       metoprolol succinate (TOPROL XL) 50 MG extended release tablet Take 200 mg by mouth daily       montelukast (SINGULAIR) 10 MG tablet Take 10 mg by mouth nightly       torsemide (DEMADEX) 20 MG tablet Take 20 mg by mouth daily       vardenafil (LEVITRA) 20 MG tablet Take 1/2 tablet by mouth one hour before sexual activity      warfarin (COUMADIN) 5 MG tablet 5 mg Indications: 2 5mg tablet every day but wednesday and on wednesday take 12.5 mg       azelastine (ASTELIN) 0.1 % nasal spray 1 spray by Nasal route 2 times daily Use in each nostril as directed       No current facility-administered medications on file prior to encounter. REVIEW OF SYSTEMS    Pertinent items are noted in HPI. Objective:      /80   Pulse 85   Temp 97.8 °F (36.6 °C) (Oral)   Resp 16   Ht 6' 5\" (1.956 m)   Wt (!) 414 lb 3.2 oz (187.9 kg)   BMI 49.12 kg/m²     PHYSICAL EXAM    Vascular: Vascular status Impaired  palpable pedal pulses, right DP1/4 and PT1/4, left DP1/4 and PT1/4. CFT 4 seconds digits 1 to 5 bilateral.  Hair growth Absent  both lower extremities and feet.   Skin temperature is warm to warm from pretibial area to distal digits bilateral.  Exam is negative for rubor, pallor, cyanosis or signs of acute vascular compromise bilaterally. Exam is positive for edema bilateral lower extremity. Varicosities Present bilateral lower extremity. Neuro: Neurologic status diminished bilateral with epicritic Present , proprioceptive Present, and protopathicPresent. DTRs Present bilateral Achilles. There were no reproducible neuritic symptoms on exam bilateral feet/ankles. Derm: Ulceration to left lateral leg. Ecchymosis Absent  bilateral feet/foot. Musculoskeletal: No pain with sharp debridement of wound 5/5 muscle strength in/eversion and dorsi/plantarflexion bilateral feet. No gross instability noted. Assessment:     Patient Active Problem List   Diagnosis    Acute pain of both knees    Primary osteoarthritis of both knees    Venous ulcer of left leg ([address]    Lymphedema    Other specified peripheral vascular diseases ([facility]    Non-pressure chronic ulcer of left calf with fat layer exposed ([facility]       Procedure Note    Performed by: Nevada Cogan, DPM    Consent obtained: Yes    Time out taken:  Yes    Pain Control: Anesthetic  Anesthetic: 4% Lidocaine Cream     Debridement:Excisional Debridement    Using curette the wound was sharply debrided    down through and including the removal of epidermis, dermis and subcutaneous tissue. Devitalized Tissue Debrided:  fibrin, biofilm, slough, necrotic/eschar and exudate    Pre Debridement Measurements:  Are located in the Wound Documentation Flow Sheet    Wound Care Documentation:  Wound 09/19/19 #1, left lateral leg, venous, full thickness, onset 9/2018? (Active)   Wound Image   1/2/2020  1:40 PM   Wound Venous 2/11/2020 10:51 AM   Dressing Status Clean;Dry; Intact 2/4/2020 12:09 PM   Dressing Changed Changed/New 2/4/2020 12:09 PM   Wound Cleansed Wound cleanser 2/11/2020 10:51 AM   Wound Length (cm) 10.2 cm 2/11/2020 10:51 AM   Wound Width (cm) 4.8 cm 2/11/2020 10:51 AM   Wound Depth (cm) 0.3 cm 2/11/2020 10:51 AM   Wound Surface Area (cm^2) 48.96 cm^2 2/11/2020 10:51 AM   Change in Wound Size % (l*w) -751.48 2/11/2020 10:51 AM   Wound Volume (cm^3) 14.69 cm^3 2/11/2020 10:51 AM   Wound Healing % -1177 2/11/2020 10:51 AM   Post-Procedure Length (cm) 10.2 cm 2/11/2020 11:24 AM   Post-Procedure Width (cm) 4.8 cm 2/11/2020 11:24 AM   Post-Procedure Depth (cm) 0.3 cm 2/11/2020 11:24 AM   Post-Procedure Surface Area (cm^2) 48.96 cm^2 2/11/2020 11:24 AM   Post-Procedure Volume (cm^3) 14.69 cm^3 2/11/2020 11:24 AM   Distance Tunneling (cm) 0 cm 2/11/2020 10:51 AM   Tunneling Position ___ O'Clock 0 2/11/2020 10:51 AM   Undermining Starts ___ O'Clock 0 2/11/2020 10:51 AM   Undermining Ends___ O'Clock 0 2/11/2020 10:51 AM   Undermining Maxium Distance (cm) 0 2/11/2020 10:51 AM   Wound Assessment Pink;Red 2/11/2020 10:51 AM   Drainage Amount Small 2/11/2020 10:51 AM   Drainage Description Serosanguinous 2/11/2020 10:51 AM   Odor None 2/11/2020 10:51 AM   Margins Defined edges 2/4/2020 10:55 AM   Libby-wound Assessment Yellow-brown 2/11/2020 10:51 AM   Number of days: 145       Total Surface Area Debrided:  40 sq cm     Percentage of wound debrided 100%    Bleeding:  Minimal    Hemostasis Achieved:  by pressure    Procedural Pain:  0  / 10     Post Procedural Pain:  0 / 10     Response to treatment:  Well tolerated by patient. Plan:   Patient examined and evaluated  Wound debrided without incident  Wounds improved today.   Discussed appropriate wound treatment including leg elevation and use of compression pumps  [facility]  Keep legs elevated all times and not active  Vascular studies ordered Venous duplex  Arterial duplex show blood flow adequate for healing  Patient admits to pain that is consistent with previous infections, prescription for Clindamycin 300 mg 3 times a day for 10 days  Follow-up in 1 week for reevaluation and debridement and on Friday for dressing change  The nature of the patient's condition was explained in depth.  The patient was informed that their compliance to the treatment plan is paramount to successful healing and prevention of further ulceration and/or infection     Discharge Treatment  Compression dressing    Written Patient Discharge Instructions Given            Electronically signed by Kristi Santillan DPM on 2/11/2020 at 11:59 AM

## 2020-02-14 ENCOUNTER — TELEPHONE (OUTPATIENT)
Dept: WOUND CARE | Age: 65
End: 2020-02-14

## 2020-02-14 NOTE — TELEPHONE ENCOUNTER
Patient called stating Janie Shay is staying up, do not need appointment today\". Confirmed patients appointment for following Tuesday.

## 2020-02-18 ENCOUNTER — HOSPITAL ENCOUNTER (OUTPATIENT)
Dept: WOUND CARE | Age: 65
Discharge: HOME | End: 2020-02-18
Payer: OTHER GOVERNMENT

## 2020-02-18 VITALS
SYSTOLIC BLOOD PRESSURE: 97 MMHG | BODY MASS INDEX: 37.19 KG/M2 | RESPIRATION RATE: 16 BRPM | DIASTOLIC BLOOD PRESSURE: 67 MMHG | HEIGHT: 77 IN | WEIGHT: 315 LBS | TEMPERATURE: 98.1 F | HEART RATE: 89 BPM

## 2020-02-18 PROCEDURE — 11042 DBRDMT SUBQ TIS 1ST 20SQCM/<: CPT

## 2020-02-18 PROCEDURE — 29581 APPL MULTLAYER CMPRN SYS LEG: CPT

## 2020-02-18 RX ORDER — LIDOCAINE 40 MG/G
CREAM TOPICAL ONCE
Status: DISCONTINUED | OUTPATIENT
Start: 2020-02-18 | End: 2020-02-19 | Stop reason: HOSPADM

## 2020-02-18 ASSESSMENT — PAIN SCALES - GENERAL: PAINLEVEL_OUTOF10: 0

## 2020-02-18 NOTE — PLAN OF CARE
Pt seen in [address] [place] [address],[address] - wound smaller but nicci wound denuded - noted green drainage- will apply collagen to wound - gentamycin cream to wound and nicci wound - added transfer  And ABD- will cont current treatment - will hold compression with compri 2 until venous doppler study done 2-- pt will have wound reassessment after testing and compression wrap reapplied - AVS reviewed F/U in 1 week - Pt also noncompliant with weight loss - no change in weight this week

## 2020-02-18 NOTE — PROGRESS NOTES
[facility]  Progress Note and Procedure Note      Dennis Fernandez  AGE: 62 y.o. GENDER: male  : 1955  TODAY'S DATE:  2020    Subjective:     Chief Complaint   Patient presents with    Wound Check         HISTORY of PRESENT ILLNESS HPI     Dennis Fernandez is a 62 y.o. male who presents today for wound evaluation. History of Wound: Patient admits to having left leg wound for over 2 years. He has left the bandages intact since his last visit. He admits that the bandage stayed up. He has his venous studies tomorrow. He denies nausea, vomiting, fever, chills, shortness of breath or chest pain.   Wound Pain: Occasional burning  Severity:  2 / 10   Wound Type:  venous, arterial and lymphedema  Modifying Factors:  lymphedema, decreased mobility, arterial insufficiency, decreased tissue oxygenation and non-adherence  Associated Signs/Symptoms:  edema and drainage        PAST MEDICAL HISTORY        Diagnosis Date    Asthma     Atrial fibrillation (HCC)     Diabetes mellitus ([address]     Hyperlipidemia     Hypertension     Thyroid disease        PAST SURGICAL HISTORY    Past Surgical History:   Procedure Laterality Date    ABLATION OF DYSRHYTHMIC FOCUS   AND     TIMES TWO    CARDIAC CATHETERIZATION Left 2015    CARDIAC DEFIBRILLATOR PLACEMENT  2016       FAMILY HISTORY    Family History   Problem Relation Age of Onset    Cancer Mother     Cancer Father        SOCIAL HISTORY    Social History     Tobacco Use    Smoking status: Never Smoker    Smokeless tobacco: Former User     Types: Snuff   Substance Use Topics    Alcohol use: No    Drug use: No       ALLERGIES    Allergies   Allergen Reactions    Pravastatin      Patient does recall reaction       MEDICATIONS    Current Outpatient Medications on File Prior to Encounter   Medication Sig Dispense Refill    ALLOPURINOL PO Take 100 mg by mouth daily       testosterone (ANDROGEL; TESTIM) 50 MG/5GM (1%) GEL 1% gel Apply topically daily.  spironolactone (ALDACTONE) 25 MG tablet Take 25 mg by mouth daily      atorvastatin (LIPITOR) 10 MG tablet Take 10 mg by mouth daily       clonazePAM (KLONOPIN) 1 MG tablet Take one-half tablet by mouth twice a day as needed for anxiety      digoxin (LANOXIN) 250 MCG tablet Take 125 mcg by mouth daily       gabapentin (NEURONTIN) 300 MG capsule Take 300 mg by mouth 3 times daily.  Levothyroxine Sodium 112 MCG CAPS Take 112 mcg by mouth daily       lisinopril (PRINIVIL;ZESTRIL) 40 MG tablet Take 30 mg by mouth daily       loratadine (CLARITIN) 10 MG tablet Take 10 mg by mouth daily       metoprolol succinate (TOPROL XL) 50 MG extended release tablet Take 200 mg by mouth daily       montelukast (SINGULAIR) 10 MG tablet Take 10 mg by mouth nightly       torsemide (DEMADEX) 20 MG tablet Take 20 mg by mouth daily       vardenafil (LEVITRA) 20 MG tablet Take 1/2 tablet by mouth one hour before sexual activity      warfarin (COUMADIN) 5 MG tablet 5 mg Indications: 2 5mg tablet every day but wednesday and on wednesday take 12.5 mg       azelastine (ASTELIN) 0.1 % nasal spray 1 spray by Nasal route 2 times daily Use in each nostril as directed       No current facility-administered medications on file prior to encounter. REVIEW OF SYSTEMS    Pertinent items are noted in HPI. Objective:      BP 97/67   Pulse 89   Temp 98.1 °F (36.7 °C) (Oral)   Resp 16   Ht 6' 5\" (1.956 m)   Wt (!) 413 lb 3.2 oz (187.4 kg)   BMI 49.00 kg/m²     PHYSICAL EXAM    Vascular: Vascular status Impaired  palpable pedal pulses, right DP1/4 and PT1/4, left DP1/4 and PT1/4. CFT 4 seconds digits 1 to 5 bilateral.  Hair growth Absent  both lower extremities and feet. Skin temperature is warm to warm from pretibial area to distal digits bilateral.  Exam is negative for rubor, pallor, cyanosis or signs of acute vascular compromise bilaterally.   Exam is positive for edema bilateral lower extremity. Varicosities Present bilateral lower extremity. Neuro: Neurologic status diminished bilateral with epicritic Present, proprioceptive Present, and protopathic Present. DTRs Present bilateral Achilles. There were no reproducible neuritic symptoms on exam bilateral feet/ankles. Derm: Ulceration to left lateral leg. Ecchymosis Absent bilateral feet/foot. Musculoskeletal: No pain with sharp debridement of wound 5/5 muscle strength in/eversion and dorsi/plantarflexion bilateral feet. No gross instability noted. Assessment:     Patient Active Problem List   Diagnosis    Acute pain of both knees    Primary osteoarthritis of both knees    Venous ulcer of left leg ([address]    Lymphedema    Other specified peripheral vascular diseases ([address]    Non-pressure chronic ulcer of left calf with fat layer exposed ([address]       Procedure Note    Performed by: Addison Farr DPM    Consent obtained: Yes    Time out taken:  Yes    Pain Control: Anesthetic  Anesthetic: 4% Lidocaine Cream     Debridement:Excisional Debridement    Using curette the wound was sharply debrided    down through and including the removal of epidermis, dermis and subcutaneous tissue. Devitalized Tissue Debrided:  fibrin, biofilm, slough, necrotic/eschar and exudate    Pre Debridement Measurements:  Are located in the Wound Documentation Flow Sheet    Wound Care Documentation:  Wound 09/19/19 #1, left lateral leg, venous, full thickness, onset 9/2018? (Active)   Wound Image   1/2/2020  1:40 PM   Wound Venous 2/11/2020 10:51 AM   Dressing Status Clean;Dry; Intact 2/4/2020 12:09 PM   Dressing Changed Changed/New 2/4/2020 12:09 PM   Wound Cleansed Wound cleanser 2/11/2020 10:51 AM   Wound Length (cm) 10.2 cm 2/11/2020 10:51 AM   Wound Width (cm) 4.8 cm 2/11/2020 10:51 AM   Wound Depth (cm) 0.3 cm 2/11/2020 10:51 AM   Wound Surface Area (cm^2) 48.96 cm^2 2/11/2020 10:51 AM   Change in Wound Size % (l*w) -751.48 2/11/2020 10:51 AM

## 2020-02-19 ENCOUNTER — HOSPITAL ENCOUNTER (OUTPATIENT)
Dept: WOUND CARE | Age: 65
Discharge: HOME | End: 2020-02-19
Payer: OTHER GOVERNMENT

## 2020-02-19 ENCOUNTER — HOSPITAL ENCOUNTER (OUTPATIENT)
Dept: VASCULAR LAB | Age: 65
Discharge: HOME | End: 2020-02-19
Payer: OTHER GOVERNMENT

## 2020-02-19 VITALS
TEMPERATURE: 97.8 F | WEIGHT: 315 LBS | HEART RATE: 91 BPM | RESPIRATION RATE: 16 BRPM | SYSTOLIC BLOOD PRESSURE: 130 MMHG | HEIGHT: 77 IN | DIASTOLIC BLOOD PRESSURE: 77 MMHG | BODY MASS INDEX: 37.19 KG/M2

## 2020-02-19 PROCEDURE — 29581 APPL MULTLAYER CMPRN SYS LEG: CPT

## 2020-02-19 PROCEDURE — 93970 EXTREMITY STUDY: CPT

## 2020-02-19 ASSESSMENT — PAIN SCALES - GENERAL: PAINLEVEL_OUTOF10: 0

## 2020-02-19 NOTE — PLAN OF CARE
Pt to the [facility] for wound reassessment dressing change. Wound stable. Pt had vascular studies done today. Pt to follow up in the [facility] in 1 week. Discharge instructions reviewed with patient, all questions answered, copy given to patient. Dressings were applied to all wounds per M.D. Instructions at this visit.

## 2020-02-25 ENCOUNTER — HOSPITAL ENCOUNTER (OUTPATIENT)
Dept: WOUND CARE | Age: 65
Discharge: HOME | End: 2020-02-25
Payer: OTHER GOVERNMENT

## 2020-02-25 VITALS
SYSTOLIC BLOOD PRESSURE: 99 MMHG | WEIGHT: 315 LBS | HEART RATE: 95 BPM | DIASTOLIC BLOOD PRESSURE: 65 MMHG | TEMPERATURE: 99.3 F | HEIGHT: 77 IN | RESPIRATION RATE: 16 BRPM | BODY MASS INDEX: 37.19 KG/M2

## 2020-02-25 PROCEDURE — 11042 DBRDMT SUBQ TIS 1ST 20SQCM/<: CPT

## 2020-02-25 PROCEDURE — 29581 APPL MULTLAYER CMPRN SYS LEG: CPT

## 2020-02-25 ASSESSMENT — PAIN SCALES - GENERAL: PAINLEVEL_OUTOF10: 0

## 2020-02-25 NOTE — PLAN OF CARE
Pt seen in [facility] - wound improved - debride per Dr. Maxwell - cont current treatment  w/ gentamycin and collagen and cont compression with compri 2 . Discussed results of venous studies with pt - referral to [facility] vein [facility] - reviewed AVS - f/u in 1 week .

## 2020-02-25 NOTE — PROGRESS NOTES
[facility]  Progress Note and Procedure Note      Joselyn Riley  AGE: 62 y.o. GENDER: male  : 1955  TODAY'S DATE:  2020    Subjective:     Chief Complaint   Patient presents with    Wound Check         HISTORY of PRESENT ILLNESS HPI     Joselyn Riley is a 62 y.o. male who presents today for wound evaluation. History of Wound: Patient admits to having left leg wound for over 2 years. He is still trying to get new lymphatic pump bags for his legs from the [place]. He completed his vascular study since his last visit. He has left the compression dressing on since his last visit. He denies nausea, vomiting, fever, chills, shortness of breath or chest pain.   Wound Pain: Occasional throbbing  Severity:  2 / 10   Wound Type:  venous, arterial and lymphedema  Modifying Factors:  lymphedema, decreased mobility, arterial insufficiency, decreased tissue oxygenation and non-adherence  Associated Signs/Symptoms:  edema and drainage        PAST MEDICAL HISTORY        Diagnosis Date    Asthma     Atrial fibrillation ([address]     Diabetes mellitus ([address]     Hyperlipidemia     Hypertension     Thyroid disease        PAST SURGICAL HISTORY    Past Surgical History:   Procedure Laterality Date    ABLATION OF DYSRHYTHMIC FOCUS   AND 2009    TIMES TWO    CARDIAC CATHETERIZATION Left 2015    CARDIAC DEFIBRILLATOR PLACEMENT  2016       FAMILY HISTORY    Family History   Problem Relation Age of Onset    Cancer Mother     Cancer Father        SOCIAL HISTORY    Social History     Tobacco Use    Smoking status: Never Smoker    Smokeless tobacco: Former User     Types: Snuff   Substance Use Topics    Alcohol use: No    Drug use: No       ALLERGIES    Allergies   Allergen Reactions    Pravastatin      Patient does recall reaction       MEDICATIONS    Current Outpatient Medications on File Prior to Encounter   Medication Sig Dispense Refill    ALLOPURINOL PO Take 100 mg by mouth daily compromise bilaterally. Exam is positive for edema bilateral lower extremity. Varicosities Present bilateral lower extremity. Neuro: Neurologic status diminished bilateral with epicritic Present, proprioceptive Present, and protopathic Present. DTRs Present bilateral Achilles. There were no reproducible neuritic symptoms on exam bilateral feet/ankles. Derm: Ulceration to left lateral leg. Ecchymosis Absent bilateral feet/foot. Musculoskeletal: No pain with sharp debridement of wound 5/5 muscle strength in/eversion and dorsi/plantarflexion bilateral feet. No gross instability noted. Assessment:     Patient Active Problem List   Diagnosis    Acute pain of both knees    Primary osteoarthritis of both knees    Venous ulcer of left leg ([address]    Lymphedema    Other specified peripheral vascular diseases ([facility]    Non-pressure chronic ulcer of left calf with fat layer exposed ([facility]       Procedure Note    Performed by: Evonne Goncalves DPM    Consent obtained: Yes    Time out taken:  Yes    Pain Control:       Debridement:Excisional Debridement    Using curette the wound was sharply debrided    down through and including the removal of epidermis, dermis and subcutaneous tissue. Devitalized Tissue Debrided:  fibrin, biofilm, slough, necrotic/eschar and exudate    Pre Debridement Measurements:  Are located in the Wound Documentation Flow Sheet    Wound Care Documentation:  Wound 09/19/19 #1, left lateral leg, venous, full thickness, onset 9/2018? (Active)   Wound Image   2/18/2020 11:00 AM   Wound Venous 2/25/2020 10:27 AM   Dressing Status Clean;Dry; Intact 2/19/2020  3:45 PM   Dressing Changed Changed/New 2/25/2020 10:27 AM   Wound Length (cm) 4 cm 2/25/2020 10:27 AM   Wound Width (cm) 3.5 cm 2/25/2020 10:27 AM   Wound Depth (cm) 0.2 cm 2/25/2020 10:27 AM   Wound Surface Area (cm^2) 14 cm^2 2/25/2020 10:27 AM   Change in Wound Size % (l*w) -143.48 2/25/2020 10:27 AM   Wound Volume (cm^3) 2.8 cm^3 2/25/2020 10:27 AM   Wound Healing % -143 2/25/2020 10:27 AM   Post-Procedure Length (cm) 4 cm 2/25/2020 10:49 AM   Post-Procedure Width (cm) 3.5 cm 2/25/2020 10:49 AM   Post-Procedure Depth (cm) 0.2 cm 2/25/2020 10:49 AM   Post-Procedure Surface Area (cm^2) 14 cm^2 2/25/2020 10:49 AM   Post-Procedure Volume (cm^3) 2.8 cm^3 2/25/2020 10:49 AM   Distance Tunneling (cm) 0 cm 2/25/2020 10:27 AM   Tunneling Position ___ O'Clock 0 2/25/2020 10:27 AM   Undermining Starts ___ O'Clock 0 2/25/2020 10:27 AM   Undermining Ends___ O'Clock 0 2/25/2020 10:27 AM   Undermining Maxium Distance (cm) 0 2/25/2020 10:27 AM   Wound Assessment Pink;Red;Yellow 2/25/2020 10:27 AM   Drainage Amount Moderate 2/25/2020 10:27 AM   Drainage Description Serosanguinous 2/25/2020 10:27 AM   Odor None 2/25/2020 10:27 AM   Margins Defined edges 2/4/2020 10:55 AM   Libby-wound Assessment Yellow-brown 2/25/2020 10:27 AM   Number of days: 158       Total Surface Area Debrided:  14 sq cm     Percentage of wound debrided 100%    Bleeding:  Minimal    Hemostasis Achieved:  by pressure    Procedural Pain:  0  / 10     Post Procedural Pain:  0 / 10     Response to treatment:  Well tolerated by patient. Plan:   Patient examined and evaluated  Wound debrided without incident  Wounds improved today. Discussed appropriate wound treatment including leg elevation and use of compression pumps  [facility] and [facility] pumps  Keep legs elevated all times and not active  Vascular studies: Showed reflux superficial and deep, follow-up with vascular for evaluation and treatment options. Arterial duplex show blood flow adequate for healing  Follow-up in 1 week for reevaluation and debridement and on Friday for dressing change  The nature of the patient's condition was explained in depth.  The patient was informed that their compliance to the treatment plan is paramount to successful healing and prevention of further ulceration and/or infection     Discharge Treatment  Compression dressing    Written Patient Discharge Instructions Given            Electronically signed by Hemanth Branham DPM on 2/25/2020 at 11:01 AM

## 2020-02-25 NOTE — FLOWSHEET NOTE
LacHydrin not applied d/t not available.      LLE: Triamcinolone/clotrimazole applied to nicci-wound, Puracol,Gentamicin,KerraMax, kerlix, compri 2 compression wrap, single G spandagrip

## 2020-02-27 RX ORDER — LIDOCAINE 40 MG/G
CREAM TOPICAL ONCE
Status: ACTIVE | OUTPATIENT
Start: 2020-02-27

## 2020-03-03 ENCOUNTER — HOSPITAL ENCOUNTER (OUTPATIENT)
Dept: WOUND CARE | Age: 65
Discharge: HOME | End: 2020-03-03
Payer: OTHER GOVERNMENT

## 2020-03-03 VITALS
BODY MASS INDEX: 37.19 KG/M2 | SYSTOLIC BLOOD PRESSURE: 104 MMHG | RESPIRATION RATE: 20 BRPM | HEART RATE: 90 BPM | DIASTOLIC BLOOD PRESSURE: 70 MMHG | HEIGHT: 77 IN | TEMPERATURE: 97.9 F | WEIGHT: 315 LBS

## 2020-03-03 PROCEDURE — 97597 DBRDMT OPN WND 1ST 20 CM/<: CPT

## 2020-03-03 PROCEDURE — 29581 APPL MULTLAYER CMPRN SYS LEG: CPT

## 2020-03-03 PROCEDURE — 97597 DBRDMT OPN WND 1ST 20 CM/<: CPT | Performed by: INTERNAL MEDICINE

## 2020-03-03 RX ORDER — DOXYCYCLINE HYCLATE 100 MG
100 TABLET ORAL 2 TIMES DAILY
COMMUNITY
Start: 2020-03-02 | End: 2020-08-30 | Stop reason: ALTCHOICE

## 2020-03-03 RX ORDER — LIDOCAINE 40 MG/G
CREAM TOPICAL ONCE
Status: DISCONTINUED | OUTPATIENT
Start: 2020-03-03 | End: 2020-03-04 | Stop reason: HOSPADM

## 2020-03-03 ASSESSMENT — PAIN SCALES - GENERAL: PAINLEVEL_OUTOF10: 0

## 2020-03-03 NOTE — PLAN OF CARE
Pt seen in [facility] - Wound improved to LLE along with nicci wound also - redness cleared up - debride per Dr. Bianca Roy - cont current treatment - w/o gentamycin cream this week - Instructed pt last week to call for appt to vein Clinic - pt noncompliant - encouraged pt again this week to call for appt - response was \" Maybe\" - pt noncompliant with compression pumps  and weight increased this week again - reviewed AVS - cont compri 2 for compression - pt has circaide to RLE - f/u in  1week

## 2020-03-05 PROBLEM — M25.561 ACUTE PAIN OF BOTH KNEES: Status: RESOLVED | Noted: 2018-12-20 | Resolved: 2020-03-05

## 2020-03-05 PROBLEM — I87.2 PERIPHERAL VENOUS INSUFFICIENCY: Status: ACTIVE | Noted: 2020-03-05

## 2020-03-05 PROBLEM — M25.562 ACUTE PAIN OF BOTH KNEES: Status: RESOLVED | Noted: 2018-12-20 | Resolved: 2020-03-05

## 2020-03-05 PROBLEM — E66.01 MORBID OBESITY (HCC): Status: ACTIVE | Noted: 2020-03-05

## 2020-03-05 NOTE — PROGRESS NOTES
[facility] Progress Note    Naty Jarvis     : 1955    DATE OF VISIT:  3/3/2020    Subjective:     Naty Jarvis is a 62 y.o. male who has a venous ulcer located on the left , lateral lower leg. Significant symptoms or pertinent wound history since last visit: feeling ok overall, less skin irritation, no F/C/D. Has his compression pump, not using it consistently. Wearing a Velcro compression garment on the right side. Less leg drainage, no pus or malodor. On doxycycline for a URI. Additional ulcer(s) noted? no.      His current medication list consists of Allopurinol, Levothyroxine Sodium, atorvastatin, azelastine, clonazePAM, digoxin, doxycycline hyclate, gabapentin, lisinopril, loratadine, metoprolol succinate, montelukast, spironolactone, testosterone, torsemide, vardenafil, and warfarin. Allergies: Pravastatin    Objective:     Vitals:    20 1026   BP: 104/70   Pulse: 90   Resp: 20   Temp: 97.9 °F (36.6 °C)   TempSrc: Oral   Weight: (!) 420 lb (190.5 kg)   Height: 6' 5\" (1.956 m)     AAOx3, overweight, NAD  Intact distal pulses, feet warm, good cap refill  Moderate BL LE stage 2 lymphedema  No cellulitis, angitis, fluctuance; much improved left leg dermatitis and moisture from recent photo (contact dermatitis from dressing or drainage?)  No allodynia at ulcer  Libby-ulcer skin: indurated, pink, erythematous  and warm. Ulcer(s): smaller, more superficial, red, granular, fibrin, biofilm, serous exudate, no signs of infection. Photos also saved in electronic chart.     Today's wound measurements, per RN documentation:  Wound 19 #1, left lateral leg, venous, full thickness, onset 2018?-Wound Length (cm): 2.2 cm    Wound 19 #1, left lateral leg, venous, full thickness, onset 2018?-Wound Width (cm): 4.1 cm    Wound 19 #1, left lateral leg, venous, full thickness, onset 2018?-Wound Depth (cm): 0.1 cm    Assessment:     Patient Active Problem help manage edema, stasis dermatitis, and/or venous ulcers. Leave primary dressing and multi-layer wrap(s) in place until the next appointment. Also discussed ways to keep the wrap dry for a shower, including a plastic cast-guard, available in retail pharmacies. He should call before his next visit if there is any significant pain, significant strike-through of drainage to the surface of the wrap, or any significant sense of the wrap sliding down more than an inch or so, bunching-up and abrading his skin. I reminded the patient of the importance of weight management and smoking cessation, if applicable; also encouraged ambulation as tolerated, additional lower extremity exercises as instructed in our education sheet, leg elevation when at rest, and compliance with any recommended dietary, diuretic and compression therapies. Written discharge instructions given to patient. Follow up in 1 week.     Electronically signed by Meera Hay MD on 3/5/2020 at 10:48 AM.

## 2020-03-10 ENCOUNTER — HOSPITAL ENCOUNTER (OUTPATIENT)
Dept: WOUND CARE | Age: 65
Discharge: HOME | End: 2020-03-10
Payer: OTHER GOVERNMENT

## 2020-03-10 VITALS
BODY MASS INDEX: 37.19 KG/M2 | HEIGHT: 77 IN | WEIGHT: 315 LBS | SYSTOLIC BLOOD PRESSURE: 112 MMHG | RESPIRATION RATE: 18 BRPM | TEMPERATURE: 97.8 F | HEART RATE: 97 BPM | DIASTOLIC BLOOD PRESSURE: 70 MMHG

## 2020-03-10 PROCEDURE — 29581 APPL MULTLAYER CMPRN SYS LEG: CPT

## 2020-03-10 PROCEDURE — 97597 DBRDMT OPN WND 1ST 20 CM/<: CPT | Performed by: INTERNAL MEDICINE

## 2020-03-10 PROCEDURE — 97597 DBRDMT OPN WND 1ST 20 CM/<: CPT

## 2020-03-10 RX ORDER — LIDOCAINE 40 MG/G
CREAM TOPICAL ONCE
Status: DISCONTINUED | OUTPATIENT
Start: 2020-03-10 | End: 2020-03-11 | Stop reason: HOSPADM

## 2020-03-10 ASSESSMENT — PAIN DESCRIPTION - DESCRIPTORS: DESCRIPTORS: ACHING

## 2020-03-10 ASSESSMENT — PAIN DESCRIPTION - ORIENTATION: ORIENTATION: LEFT

## 2020-03-10 ASSESSMENT — PAIN DESCRIPTION - FREQUENCY: FREQUENCY: INTERMITTENT

## 2020-03-10 ASSESSMENT — PAIN DESCRIPTION - LOCATION: LOCATION: LEG

## 2020-03-10 ASSESSMENT — PAIN SCALES - GENERAL: PAINLEVEL_OUTOF10: 2

## 2020-03-10 ASSESSMENT — PAIN DESCRIPTION - PAIN TYPE: TYPE: CHRONIC PAIN

## 2020-03-10 ASSESSMENT — PAIN DESCRIPTION - PROGRESSION: CLINICAL_PROGRESSION: NOT CHANGED

## 2020-03-10 ASSESSMENT — PAIN DESCRIPTION - ONSET: ONSET: ON-GOING

## 2020-03-10 ASSESSMENT — PAIN DESCRIPTION - DIRECTION: RADIATING_TOWARDS: DENIES

## 2020-03-10 ASSESSMENT — PAIN - FUNCTIONAL ASSESSMENT: PAIN_FUNCTIONAL_ASSESSMENT: ACTIVITIES ARE NOT PREVENTED

## 2020-03-12 NOTE — PROGRESS NOTES
88 [facility] Progress Note    Octavio Castaneda     : 1955    DATE OF VISIT:  3/10/2020    Subjective:     Octavio Castaneda is a 62 y.o. male who has a venous and  lymphedema ulcer located on the left  lower leg. Significant symptoms or pertinent wound history since last visit: feeling ok overall, mild leg pain, modest drainage, less pruritus, stable swelling, no F/C/D. Concerned about bills that he received from Dr. Cherri Duvall, and I wasn't sure why that would have been any different than bills he could have received when our NP was seeing him, but I think it must be due to the fact that his referral from the  [facility] to here covered [facility]) providers, but not Dr. Cherri Duvall, who is in private practice. Says that if this must continue, he won't be able to come back here for care. Also has not yet called vascular surgery for appt because of financial concerns. Additional ulcer(s) noted? no.      His current medication list consists of Allopurinol, Levothyroxine Sodium, atorvastatin, azelastine, clonazePAM, digoxin, doxycycline hyclate, gabapentin, lisinopril, loratadine, metoprolol succinate, montelukast, spironolactone, testosterone, torsemide, vardenafil, and warfarin. Allergies: Pravastatin    Objective:     Vitals:    03/10/20 1002   BP: 112/70   Pulse: 97   Resp: 18   Temp: 97.8 °F (36.6 °C)   TempSrc: Oral   Weight: (!) 411 lb 12.8 oz (186.8 kg)   Height: 6' 5\" (1.956 m)     AAOx3, overweight, NAD  Intact distal pulses, feet warm, good cap refill  Moderate BL LE stage 2 lymphedema  Usual hemosiderin changes, mild stasis erythema, no acute dermatitis  No cellulitis, angitis, fluctuance  Libby-ulcer skin: indurated, pink, warm and hemosiderin changes. Ulcer(s): slightly smaller cluster, red, varying from granular to fibrotic, some fibrin and biofilm, serous exudate. Photos also saved in electronic chart.     Today's wound measurements, per RN documentation:  Wound 19 #1, left lateral leg, venous, full thickness, onset 9/2018?-Wound Length (cm): 2.5 cm    Wound 09/19/19 #1, left lateral leg, venous, full thickness, onset 9/2018?-Wound Width (cm): 3.3 cm    Wound 09/19/19 #1, left lateral leg, venous, full thickness, onset 9/2018?-Wound Depth (cm): 0.1 cm    Assessment:     Patient Active Problem List   Diagnosis Code    Primary osteoarthritis of both knees M17.0    Lymphedema I89.0    Other specified peripheral vascular diseases ([facility] I73.89    Non-pressure chronic ulcer of left calf with fat layer exposed ([facility] [address] [facility] L97.222    Morbid obesity ([facility] [address] E66.01    Peripheral venous insufficiency I87.2       Assessment of today's active condition(s): venous stasis, lymphedema, skin changes, slowly healing venous leg ulcer, no signs of infection or ischemia, and with recent dermatitis much improved. Factors contributing to occurrence and/or persistence of the chronic ulcer include venous stasis, lymphedema, decreased mobility and obesity. Medical necessity of today's visit is shown by the above documentation. Sharp debridement is indicated today, based upon the exam findings in the wound(s) above. Procedure note:     Consent obtained. Time out performed per [facility] policy. Anesthetic  Anesthetic: 4% Lidocaine Cream     Using a curette, I sharply debrided the left  lower leg ulcer(s) down through and including the removal of dermis. The type(s) of tissue debrided included fibrin and biofilm. Total Surface Area Debrided: just 2-3 sq cm. The ulcers were then irrigated with normal saline solution. The procedure was completed with a small amount of bleeding, and hemostasis was with pressure. The patient tolerated the procedure well, with no significant complications. The patient's level of pain during and after the procedure was monitored. Post-debridement measurements, if different from pre-debridement, are in the flowsheet as well.     Discharge plan:     Treatment in the wound care

## 2020-03-17 ENCOUNTER — HOSPITAL ENCOUNTER (OUTPATIENT)
Dept: WOUND CARE | Age: 65
Discharge: HOME | End: 2020-03-17
Payer: OTHER GOVERNMENT

## 2020-03-24 ENCOUNTER — HOSPITAL ENCOUNTER (OUTPATIENT)
Dept: WOUND CARE | Age: 65
Discharge: HOME | End: 2020-03-24
Payer: OTHER GOVERNMENT

## 2020-03-31 ENCOUNTER — HOSPITAL ENCOUNTER (OUTPATIENT)
Dept: WOUND CARE | Age: 65
Discharge: HOME | End: 2020-03-31

## 2020-07-21 ENCOUNTER — TELEPHONE (OUTPATIENT)
Dept: WOUND CARE | Age: 65
End: 2020-07-21

## 2020-07-21 NOTE — TELEPHONE ENCOUNTER
Received referral on fax from [place]. Called patient to schedule and patient refused needing our service. Stated he had a wound that he was caring for himself and did not want to schedule an appointment with us at this time.

## 2020-08-08 ENCOUNTER — HOSPITAL ENCOUNTER (OUTPATIENT)
Dept: ULTRASOUND IMAGING | Age: 65
Discharge: HOME | End: 2020-08-08
Payer: OTHER GOVERNMENT

## 2020-08-08 PROCEDURE — 76870 US EXAM SCROTUM: CPT

## 2020-08-30 ENCOUNTER — HOSPITAL ENCOUNTER (EMERGENCY)
Age: 65
Discharge: HOME | End: 2020-08-30
Payer: OTHER GOVERNMENT

## 2020-08-30 VITALS
WEIGHT: 315 LBS | RESPIRATION RATE: 18 BRPM | OXYGEN SATURATION: 97 % | BODY MASS INDEX: 39.17 KG/M2 | SYSTOLIC BLOOD PRESSURE: 112 MMHG | HEIGHT: 75 IN | TEMPERATURE: 98.2 F | HEART RATE: 95 BPM | DIASTOLIC BLOOD PRESSURE: 74 MMHG

## 2020-08-30 LAB
A/G RATIO: 1.1 (ref 1.1–2.2)
ALBUMIN SERPL-MCNC: 3.9 G/DL (ref 3.4–5)
ALP BLD-CCNC: 66 U/L (ref 40–129)
ALT SERPL-CCNC: 35 U/L (ref 10–40)
ANION GAP SERPL CALCULATED.3IONS-SCNC: 13 MMOL/L (ref 3–16)
AST SERPL-CCNC: 41 U/L (ref 15–37)
BASOPHILS ABSOLUTE: 0.1 K/UL (ref 0–0.2)
BASOPHILS RELATIVE PERCENT: 1 %
BILIRUB SERPL-MCNC: 0.6 MG/DL (ref 0–1)
BUN BLDV-MCNC: 17 MG/DL (ref 7–20)
CALCIUM SERPL-MCNC: 10 MG/DL (ref 8.3–10.6)
CHLORIDE BLD-SCNC: 100 MMOL/L (ref 99–110)
CO2: 27 MMOL/L (ref 21–32)
CREAT SERPL-MCNC: 0.9 MG/DL (ref 0.8–1.3)
EOSINOPHILS ABSOLUTE: 0.2 K/UL (ref 0–0.6)
EOSINOPHILS RELATIVE PERCENT: 3 %
GFR AFRICAN AMERICAN: >60
GFR NON-AFRICAN AMERICAN: >60
GLOBULIN: 3.5 G/DL
GLUCOSE BLD-MCNC: 102 MG/DL (ref 70–99)
HCT VFR BLD CALC: 39.1 % (ref 40.5–52.5)
HEMOGLOBIN: 13 G/DL (ref 13.5–17.5)
INR BLD: 2.52 (ref 0.86–1.14)
LACTIC ACID, SEPSIS: 2.3 MMOL/L (ref 0.4–1.9)
LACTIC ACID: 2.6 MMOL/L (ref 0.4–2)
LYMPHOCYTES ABSOLUTE: 2 K/UL (ref 1–5.1)
LYMPHOCYTES RELATIVE PERCENT: 30.7 %
MCH RBC QN AUTO: 30.5 PG (ref 26–34)
MCHC RBC AUTO-ENTMCNC: 33.2 G/DL (ref 31–36)
MCV RBC AUTO: 91.8 FL (ref 80–100)
MONOCYTES ABSOLUTE: 0.6 K/UL (ref 0–1.3)
MONOCYTES RELATIVE PERCENT: 8.3 %
NEUTROPHILS ABSOLUTE: 3.8 K/UL (ref 1.7–7.7)
NEUTROPHILS RELATIVE PERCENT: 57 %
PDW BLD-RTO: 14.8 % (ref 12.4–15.4)
PLATELET # BLD: 171 K/UL (ref 135–450)
PMV BLD AUTO: 9.3 FL (ref 5–10.5)
POTASSIUM SERPL-SCNC: 4.5 MMOL/L (ref 3.5–5.1)
PROTHROMBIN TIME: 28.4 SEC (ref 10–13.2)
RBC # BLD: 4.26 M/UL (ref 4.2–5.9)
SODIUM BLD-SCNC: 140 MMOL/L (ref 136–145)
TOTAL PROTEIN: 7.4 G/DL (ref 6.4–8.2)
WBC # BLD: 6.7 K/UL (ref 4–11)

## 2020-08-30 PROCEDURE — 87077 CULTURE AEROBIC IDENTIFY: CPT

## 2020-08-30 PROCEDURE — 87205 SMEAR GRAM STAIN: CPT

## 2020-08-30 PROCEDURE — 85610 PROTHROMBIN TIME: CPT

## 2020-08-30 PROCEDURE — 87040 BLOOD CULTURE FOR BACTERIA: CPT

## 2020-08-30 PROCEDURE — 83605 ASSAY OF LACTIC ACID: CPT

## 2020-08-30 PROCEDURE — 85025 COMPLETE CBC W/AUTO DIFF WBC: CPT

## 2020-08-30 PROCEDURE — 99283 EMERGENCY DEPT VISIT LOW MDM: CPT

## 2020-08-30 PROCEDURE — 87070 CULTURE OTHR SPECIMN AEROBIC: CPT

## 2020-08-30 PROCEDURE — 80053 COMPREHEN METABOLIC PANEL: CPT

## 2020-08-30 PROCEDURE — 6370000000 HC RX 637 (ALT 250 FOR IP): Performed by: PHYSICIAN ASSISTANT

## 2020-08-30 PROCEDURE — 36415 COLL VENOUS BLD VENIPUNCTURE: CPT

## 2020-08-30 PROCEDURE — 87186 SC STD MICRODIL/AGAR DIL: CPT

## 2020-08-30 RX ORDER — DOXYCYCLINE HYCLATE 100 MG
100 TABLET ORAL ONCE
Status: DISCONTINUED | OUTPATIENT
Start: 2020-08-30 | End: 2020-08-30

## 2020-08-30 RX ORDER — DOXYCYCLINE HYCLATE 100 MG
100 TABLET ORAL 2 TIMES DAILY
Qty: 20 TABLET | Refills: 0 | Status: SHIPPED | OUTPATIENT
Start: 2020-08-30 | End: 2020-08-30

## 2020-08-30 RX ORDER — CLINDAMYCIN HYDROCHLORIDE 300 MG/1
300 CAPSULE ORAL 3 TIMES DAILY
Qty: 30 CAPSULE | Refills: 0 | Status: SHIPPED | OUTPATIENT
Start: 2020-08-30 | End: 2020-09-09

## 2020-08-30 RX ORDER — CLINDAMYCIN HYDROCHLORIDE 150 MG/1
300 CAPSULE ORAL ONCE
Status: COMPLETED | OUTPATIENT
Start: 2020-08-30 | End: 2020-08-30

## 2020-08-30 RX ADMIN — CLINDAMYCIN HYDROCHLORIDE 300 MG: 150 CAPSULE ORAL at 15:05

## 2020-08-30 ASSESSMENT — ENCOUNTER SYMPTOMS
VOMITING: 0
SHORTNESS OF BREATH: 0
COLOR CHANGE: 1

## 2020-08-30 ASSESSMENT — PAIN SCALES - GENERAL
PAINLEVEL_OUTOF10: 7
PAINLEVEL_OUTOF10: 7

## 2020-08-30 NOTE — ED NOTES
MD and MLP updated to asymptomatic hypotension. BP rechecked 107/68 in other arm.        Yolanda Mata RN  08/30/20 5349

## 2020-08-30 NOTE — ED PROVIDER NOTES
[address]        Pt Name: Luz Marina Watson  MRN: [id number]  [place] 1955  Date of evaluation: 8/30/2020  Provider: Isabell Allen PA-C  PCP: Moy Lucero    Shared Visit or Autonomous Visit: RENETTA. I have evaluated this patient. My supervising physician was available for consultation. CHIEF COMPLAINT       Chief Complaint   Patient presents with    Cellulitis     chronic swelling drainage redness left lower leg. HISTORY OF PRESENT ILLNESS   (Location/Symptom, Timing/Onset, Context/Setting, Quality, Duration, Modifying Factors, Severity)  Note limiting factors. Luz Marina Watson is a 60 y.o. male presenting to the emergency department with complaint of worsening wound at left leg for the past 5 days. States original injury he was scraped by a blackberry thorn when he was working outside 2 years ago and he has had a chronic wound at his left lower outer leg since [facility] it heals up and then worsens this has been happening ever since. He had been in wound care for a while [facility] then he started taking care of his wound at home by himself and currently he has a home health nurse that comes out twice a week to do wound dressings and he changes the dressing the other days in between. He sees the [place]. When he started having some pain redness and some drainage and the wound has opened up more her over the past 5 days states feels like it is getting infected again. States this is usually how it feels when he gets infection. No fever. No vomiting. No chest pain or any new shortness of breath. States Dr Rios Hale told him to get checked out and get started on antibiotics. The history is provided by the patient. Nursing Notes were reviewed    REVIEW OF SYSTEMS    (2-9 systems for level 4, 10 or more for level 5)     Review of Systems   Constitutional: Negative for chills and fever. Respiratory: Negative for shortness of breath. Cardiovascular: Positive for leg swelling. Negative for chest pain. Gastrointestinal: Negative for vomiting. Skin: Positive for color change (erythema) and wound (chronic left leg). All other systems reviewed and are negative. Positives and Pertinent negatives as per HPI. PAST MEDICAL HISTORY     Past Medical History:   Diagnosis Date    Asthma     Atrial fibrillation ([facility]     Diabetes mellitus ([facility] [address] [facility]     Hyperlipidemia     Hypertension     Thyroid disease          SURGICAL HISTORY     Past Surgical History:   Procedure Laterality Date    ABLATION OF DYSRHYTHMIC FOCUS  2009 AND 2009    TIMES TWO    CARDIAC CATHETERIZATION Left 2015    CARDIAC DEFIBRILLATOR PLACEMENT  2016         [address]       Discharge Medication List as of 8/30/2020  3:02 PM      CONTINUE these medications which have NOT CHANGED    Details   ALLOPURINOL PO Take 100 mg by mouth daily Historical Med      spironolactone (ALDACTONE) 25 MG tablet Take 25 mg by mouth dailyHistorical Med      atorvastatin (LIPITOR) 10 MG tablet Take 10 mg by mouth daily Historical Med      digoxin (LANOXIN) 250 MCG tablet Take 125 mcg by mouth daily Historical Med      gabapentin (NEURONTIN) 300 MG capsule Take 300 mg by mouth 3 times daily.  Historical Med      Levothyroxine Sodium 112 MCG CAPS Take 112 mcg by mouth daily Historical Med      lisinopril (PRINIVIL;ZESTRIL) 40 MG tablet Take 30 mg by mouth daily Historical Med      loratadine (CLARITIN) 10 MG tablet Take 10 mg by mouth daily Historical Med      metoprolol succinate (TOPROL XL) 50 MG extended release tablet Take 200 mg by mouth daily Historical Med      montelukast (SINGULAIR) 10 MG tablet Take 10 mg by mouth nightly Historical Med      torsemide (DEMADEX) 20 MG tablet Take 20 mg by mouth daily Historical Med      warfarin (COUMADIN) 5 MG tablet 5 mg Indications: 2 5mg tablet every day but wednesday and on wednesday take 12.5 mg Historical Med      azelastine (ASTELIN) 0.1 % nasal spray 1 spray by Nasal route 2 times daily Use in each nostril as directedHistorical Med      testosterone (ANDROGEL; TESTIM) 50 MG/5GM (1%) GEL 1% gel Apply topically daily. Historical Med      clonazePAM (KLONOPIN) 1 MG tablet Take one-half tablet by mouth twice a day as needed for anxietyHistorical Med      vardenafil (LEVITRA) 20 MG tablet Take 1/2 tablet by mouth one hour before sexual activityHistorical Med               ALLERGIES     Pravastatin    FAMILYHISTORY       Family History   Problem Relation Age of Onset    Cancer Mother     Cancer Father           SOCIAL HISTORY       Social History     Socioeconomic History    Marital status:      Spouse name: Not on file    Number of children: Not on file    Years of education: Not on file    Highest education level: Not on file   Occupational History    Not on file   Social Needs    Financial resource strain: Not on file    Food insecurity     Worry: Not on file     Inability: Not on file    Transportation needs     Medical: Not on file     Non-medical: Not on file   Tobacco Use    Smoking status: Never Smoker    Smokeless tobacco: Former User     Types: Snuff   Substance and Sexual Activity    Alcohol use: No    Drug use: No    Sexual activity: Yes     Partners: Female   Lifestyle    Physical activity     Days per week: Not on file     Minutes per session: Not on file    Stress: Not on file   Relationships    Social connections     Talks on phone: Not on file     Gets together: Not on file     Attends Church service: Not on file     Active member of club or organization: Not on file     Attends meetings of clubs or organizations: Not on file     Relationship status: Not on file    Intimate partner violence     Fear of current or ex partner: Not on file     Emotionally abused: Not on file     Physically abused: Not on file     Forced sexual activity: Not on file   Other Topics Concern    Not on file   Social History Narrative    Not on file       SCREENINGS    Miriam Coma Scale  Eye Opening: Spontaneous  Best Verbal Response: Oriented  Best Motor Response: Obeys commands  Nemo Coma Scale Score: 15        PHYSICAL EXAM    (up to 7 for level 4, 8 or more for level 5)     ED Triage Vitals [08/30/20 1230]   BP Temp Temp Source Pulse Resp SpO2 Height Weight   120/68 98.2 °F (36.8 °C) Oral 103 16 99 % 6' 3\" (1.905 m) (!) 420 lb (190.5 kg)       Physical Exam  Vitals signs and nursing note reviewed. Constitutional:       Appearance: He is well-developed. He is obese. He is not toxic-appearing. HENT:      Head: Normocephalic and atraumatic. Mouth/Throat:      Mouth: Mucous membranes are moist.      Pharynx: Oropharynx is clear. No pharyngeal swelling or posterior oropharyngeal erythema. Eyes:      Conjunctiva/sclera: Conjunctivae normal.      Pupils: Pupils are equal, round, and reactive to light. Neck:      Musculoskeletal: Normal range of motion and neck supple. Cardiovascular:      Rate and Rhythm: Normal rate and regular rhythm. Pulses:           Dorsalis pedis pulses are detected w/ Doppler on the right side and detected w/ Doppler on the left side. Posterior tibial pulses are detected w/ Doppler on the right side and detected w/ Doppler on the left side. Heart sounds: Normal heart sounds. Pulmonary:      Effort: Pulmonary effort is normal. No respiratory distress. Breath sounds: Normal breath sounds. No stridor. No wheezing, rhonchi or rales. Abdominal:      General: Bowel sounds are normal.      Palpations: Abdomen is soft. Abdomen is not rigid. Tenderness: There is no abdominal tenderness. There is no guarding or rebound. Musculoskeletal: Normal range of motion. Skin:     General: Skin is warm and dry. Neurological:      Mental Status: He is alert and oriented to person, place, and time. GCS: GCS eye subscore is 4. GCS verbal subscore is 5.  GCS motor subscore is 6.      Cranial Nerves: No cranial nerve deficit. Sensory: Sensation is intact. No sensory deficit. Motor: Motor function is intact. No abnormal muscle tone. Coordination: Coordination normal.   Psychiatric:         Speech: Speech normal.         Behavior: Behavior normal.         Thought Content: Thought content normal.         DIAGNOSTIC RESULTS   LABS:    Labs Reviewed   CBC WITH AUTO DIFFERENTIAL - Abnormal; Notable for the following components:       Result Value    Hemoglobin 13.0 (*)     Hematocrit 39.1 (*)     All other components within normal limits    Narrative:     Performed at:  [facility]. [facility]  [address] [place] [address]. [facility], [address]   Phone [phone]   COMPREHENSIVE METABOLIC PANEL - Abnormal; Notable for the following components:    Glucose 102 (*)     AST 41 (*)     All other components within normal limits    Narrative:     Performed at:  [facility]. [facility]  [address] [place] [address]. [facility], [address]   Phone [phone]   LACTATE, SEPSIS - Abnormal; Notable for the following components:    Lactic Acid, Sepsis 2.3 (*)     All other components within normal limits    Narrative:     Performed at:  [facility]. [facility]  [address] [place] [address]. [place], [address]   Phone ( - Abnormal; Notable for the following components:    Protime 28.4 (*)     INR 2.52 (*)     All other components within normal limits    Narrative:     Performed at:  [facility]. [facility]  [address] [place] [address]. [facility], [address]   Phone [phone]   LACTIC ACID, PLASMA - Abnormal; Notable for the following components:    Lactic Acid 2.6 (*)     All other components within normal limits    Narrative:     Performed at:  [facility]. [facility]  [address] [place] [address].  [facility], [address]   Phone [phone]   CULTURE, BLOOD 1   CULTURE, BLOOD 2   CULTURE, WOUND   LACTATE, SEPSIS     Results for orders placed or performed during the hospital encounter of 08/30/20   CBC Auto Differential   Result Value Ref Range    WBC 6.7 4.0 - 11.0 K/uL    RBC 4.26 4.20 - 5.90 M/uL    Hemoglobin 13.0 (L) 13.5 - 17.5 g/dL    Hematocrit 39.1 (L) 40.5 - 52.5 %    MCV 91.8 80.0 - 100.0 fL    MCH 30.5 26.0 - 34.0 pg    MCHC 33.2 31.0 - 36.0 g/dL    RDW 14.8 12.4 - 15.4 %    Platelets 601 941 - 093 K/uL    MPV 9.3 5.0 - 10.5 fL    Neutrophils % 57.0 %    Lymphocytes % 30.7 %    Monocytes % 8.3 %    Eosinophils % 3.0 %    Basophils % 1.0 %    Neutrophils Absolute 3.8 1.7 - 7.7 K/uL    Lymphocytes Absolute 2.0 1.0 - 5.1 K/uL    Monocytes Absolute 0.6 0.0 - 1.3 K/uL    Eosinophils Absolute 0.2 0.0 - 0.6 K/uL    Basophils Absolute 0.1 0.0 - 0.2 K/uL   Comprehensive Metabolic Panel   Result Value Ref Range    Sodium 140 136 - 145 mmol/L    Potassium 4.5 3.5 - 5.1 mmol/L    Chloride 100 99 - 110 mmol/L    CO2 27 21 - 32 mmol/L    Anion Gap 13 3 - 16    Glucose 102 (H) 70 - 99 mg/dL    BUN 17 7 - 20 mg/dL    CREATININE 0.9 0.8 - 1.3 mg/dL    GFR Non-African American >60 >60    GFR African American >60 >60    Calcium 10.0 8.3 - 10.6 mg/dL    Total Protein 7.4 6.4 - 8.2 g/dL    Alb 3.9 3.4 - 5.0 g/dL    Albumin/Globulin Ratio 1.1 1.1 - 2.2    Total Bilirubin 0.6 0.0 - 1.0 mg/dL    Alkaline Phosphatase 66 40 - 129 U/L    ALT 35 10 - 40 U/L    AST 41 (H) 15 - 37 U/L    Globulin 3.5 g/dL   Lactate, Sepsis   Result Value Ref Range    Lactic Acid, Sepsis 2.3 (H) 0.4 - 1.9 mmol/L   Protime-INR   Result Value Ref Range    Protime 28.4 (H) 10.0 - 13.2 sec    INR 2.52 (H) 0.86 - 1.14   Lactic acid, plasma   Result Value Ref Range    Lactic Acid 2.6 (H) 0.4 - 2.0 mmol/L       All other labs were within normal range or not returned as of this dictation. EKG:  All EKG's are interpreted by the Emergency Department Physician in the absence of a cardiologist.  Please see their note for interpretation of EKG. RADIOLOGY:   Non-plain film images such as CT, Ultrasound and MRI are read by the radiologist. Plain radiographic images are visualized andpreliminarily interpreted by the  ED Provider with the below findings:        Interpretation Formerly named [facility] Radiologist below, if available at the time of this note:    No orders to display     No results found. PROCEDURES   Unless otherwise noted below, none     Procedures    CRITICAL CARE TIME   N/A    CONSULTS:  None      EMERGENCY DEPARTMENT COURSE and DIFFERENTIAL DIAGNOSIS/MDM:   Vitals:    Vitals:    08/30/20 1425 08/30/20 1429 08/30/20 1438 08/30/20 1500   BP: 94/72 91/69 107/68 112/74   Pulse:  101 92 95   Resp: 16  16 18   Temp:       TempSrc:       SpO2: 98% 96% 98% 97%   Weight:       Height:           Patient was given thefollowing medications:  Medications   clindamycin (CLEOCIN) capsule 300 mg (300 mg Oral Given 8/30/20 1505)        2:44 PM EDT  Elevated lactic acid 2.3 was repeated and is 2.6. We discussed admitting him to the hospital for cellulitis and lactic acidosis but he is refusing. States he will not be admitted states he will be going home today he does not want to stay in the hospital. States he knows his body and states he's been dealing with this for years and states he only came in for antibiotics and wants to go home. We discussed giving him IV fluids here, he has a hx CHF and takes 2 water pills states he feels dry, no rales, we discussed giving him IV fluids here but he is refusing, he has been drinking water here and states he will continue drinking fluids at home. I offered to refer him back to wound care center but states now he will not go back there. He agrees to continue seeing his home care nurse and I advised him to contact Dr Avi Montenegro tomorrow to arrange close follow up appointment. We also discussed returning to the emergency department for any worsening symptoms.   He understands. He has elevated lactic acid level but he is not septic or toxic appearing. He is requesting to go home. He is discharged home to contact his doctor tomorrow. Case was discussed with attending ER physician Dr Lennox Los. FINAL IMPRESSION      1. Open wound of left lower leg, initial encounter    2. Cellulitis of skin    3. Chronic venous insufficiency    4. Elevated lactic acid level          DISPOSITION/PLAN   DISPOSITION     PATIENT REFERREDTO:  [address] [address]  [phone]    In 2 days  For wound re-check    [id number]  [facility] Emergency Department  [address] [place] [address]  [phone]    If symptoms worsen      DISCHARGE MEDICATIONS:  Discharge Medication List as of 8/30/2020  3:02 PM      START taking these medications    Details   clindamycin (CLEOCIN) 300 MG capsule Take 1 capsule by mouth 3 times daily for 10 days, Disp-30 capsule,R-0Print             DISCONTINUED MEDICATIONS:  Discharge Medication List as of 8/30/2020  3:02 PM                 (Please note that portions ofthis note were completed with a voice recognition program.  Efforts were made to edit the dictations but occasionally words are mis-transcribed.)    Alyssa Bronson PA-C (electronically signed)            Jason Duarte PA-C  08/30/20 1458

## 2020-08-30 NOTE — ED NOTES
No distress. BP 91/69 NSR 94 \"My bp has been running around a 100 on the top for the past month. No change in diuretics or bp rx's for a long time / pt report. Denies feeling light headed when walking.        Naya Story RN  08/30/20 5162

## 2020-08-30 NOTE — ED NOTES
Nurse removed 20g iv from [facility] sl. Bleeding controlled.      Paige Polanco RN  08/30/20 0639

## 2020-08-30 NOTE — ED NOTES
Doppler done on bilat feet for pulses.  Pedal and post tib heard in bilat feet     Dina Keyes RN  08/30/20 4105

## 2020-09-02 LAB
GRAM STAIN RESULT: ABNORMAL
ORGANISM: ABNORMAL
WOUND/ABSCESS: ABNORMAL

## 2020-09-04 LAB
BLOOD CULTURE, ROUTINE: NORMAL
CULTURE, BLOOD 2: NORMAL

## 2020-09-11 ENCOUNTER — APPOINTMENT (OUTPATIENT)
Dept: CT IMAGING | Age: 65
DRG: 338 | End: 2020-09-11
Payer: MEDICARE

## 2020-09-11 ENCOUNTER — ANESTHESIA EVENT (OUTPATIENT)
Dept: OPERATING ROOM | Age: 65
DRG: 338 | End: 2020-09-11
Payer: MEDICARE

## 2020-09-11 ENCOUNTER — ANESTHESIA (OUTPATIENT)
Dept: OPERATING ROOM | Age: 65
DRG: 338 | End: 2020-09-11
Payer: MEDICARE

## 2020-09-11 ENCOUNTER — HOSPITAL ENCOUNTER (INPATIENT)
Age: 65
LOS: 14 days | Discharge: HOME HEALTH | DRG: 338 | End: 2020-09-25
Attending: EMERGENCY MEDICINE | Admitting: SURGERY
Payer: MEDICARE

## 2020-09-11 VITALS — SYSTOLIC BLOOD PRESSURE: 103 MMHG | DIASTOLIC BLOOD PRESSURE: 56 MMHG | TEMPERATURE: 97.7 F | OXYGEN SATURATION: 91 %

## 2020-09-11 PROBLEM — K35.32 PERFORATED APPENDICITIS: Status: ACTIVE | Noted: 2020-09-11

## 2020-09-11 LAB
A/G RATIO: 1.2 (ref 1.1–2.2)
ABO/RH: NORMAL
ALBUMIN SERPL-MCNC: 4.1 G/DL (ref 3.4–5)
ALP BLD-CCNC: 75 U/L (ref 40–129)
ALT SERPL-CCNC: 29 U/L (ref 10–40)
ANION GAP SERPL CALCULATED.3IONS-SCNC: 13 MMOL/L (ref 3–16)
ANTIBODY SCREEN: NORMAL
AST SERPL-CCNC: 31 U/L (ref 15–37)
BASOPHILS ABSOLUTE: 0.1 K/UL (ref 0–0.2)
BASOPHILS RELATIVE PERCENT: 0.7 %
BILIRUB SERPL-MCNC: 1.2 MG/DL (ref 0–1)
BILIRUBIN URINE: NEGATIVE
BLOOD, URINE: NEGATIVE
BUN BLDV-MCNC: 16 MG/DL (ref 7–20)
CALCIUM SERPL-MCNC: 9.7 MG/DL (ref 8.3–10.6)
CHLORIDE BLD-SCNC: 98 MMOL/L (ref 99–110)
CLARITY: CLEAR
CO2: 27 MMOL/L (ref 21–32)
COLOR: YELLOW
CREAT SERPL-MCNC: 0.9 MG/DL (ref 0.8–1.3)
EKG ATRIAL RATE: 45 BPM
EKG DIAGNOSIS: NORMAL
EKG Q-T INTERVAL: 332 MS
EKG QRS DURATION: 102 MS
EKG QTC CALCULATION (BAZETT): 434 MS
EKG R AXIS: 72 DEGREES
EKG T AXIS: -56 DEGREES
EKG VENTRICULAR RATE: 103 BPM
EOSINOPHILS ABSOLUTE: 0.1 K/UL (ref 0–0.6)
EOSINOPHILS RELATIVE PERCENT: 0.5 %
GFR AFRICAN AMERICAN: >60
GFR NON-AFRICAN AMERICAN: >60
GLOBULIN: 3.5 G/DL
GLUCOSE BLD-MCNC: 129 MG/DL (ref 70–99)
GLUCOSE BLD-MCNC: 133 MG/DL (ref 70–99)
GLUCOSE BLD-MCNC: 152 MG/DL (ref 70–99)
GLUCOSE URINE: NEGATIVE MG/DL
HCT VFR BLD CALC: 37.9 % (ref 40.5–52.5)
HEMOGLOBIN: 12.4 G/DL (ref 13.5–17.5)
INR BLD: 2.23 (ref 0.86–1.14)
KETONES, URINE: NEGATIVE MG/DL
LEUKOCYTE ESTERASE, URINE: NEGATIVE
LIPASE: 31 U/L (ref 13–60)
LYMPHOCYTES ABSOLUTE: 1.6 K/UL (ref 1–5.1)
LYMPHOCYTES RELATIVE PERCENT: 13.9 %
MCH RBC QN AUTO: 30 PG (ref 26–34)
MCHC RBC AUTO-ENTMCNC: 32.7 G/DL (ref 31–36)
MCV RBC AUTO: 91.9 FL (ref 80–100)
MICROSCOPIC EXAMINATION: NORMAL
MONOCYTES ABSOLUTE: 1.1 K/UL (ref 0–1.3)
MONOCYTES RELATIVE PERCENT: 9.2 %
NEUTROPHILS ABSOLUTE: 9 K/UL (ref 1.7–7.7)
NEUTROPHILS RELATIVE PERCENT: 75.7 %
NITRITE, URINE: NEGATIVE
PDW BLD-RTO: 15 % (ref 12.4–15.4)
PERFORMED ON: ABNORMAL
PERFORMED ON: ABNORMAL
PH UA: 7 (ref 5–8)
PLATELET # BLD: 168 K/UL (ref 135–450)
PMV BLD AUTO: 9.2 FL (ref 5–10.5)
POTASSIUM SERPL-SCNC: 4.8 MMOL/L (ref 3.5–5.1)
PROTEIN UA: NEGATIVE MG/DL
PROTHROMBIN TIME: 26.1 SEC (ref 10–13.2)
RBC # BLD: 4.13 M/UL (ref 4.2–5.9)
SODIUM BLD-SCNC: 138 MMOL/L (ref 136–145)
SPECIFIC GRAVITY UA: 1.01 (ref 1–1.03)
SPECIMEN STATUS: NORMAL
TOTAL PROTEIN: 7.6 G/DL (ref 6.4–8.2)
URINE REFLEX TO CULTURE: NORMAL
URINE TYPE: NORMAL
UROBILINOGEN, URINE: 1 E.U./DL
WBC # BLD: 11.9 K/UL (ref 4–11)

## 2020-09-11 PROCEDURE — 2700000000 HC OXYGEN THERAPY PER DAY

## 2020-09-11 PROCEDURE — 3700000001 HC ADD 15 MINUTES (ANESTHESIA): Performed by: SURGERY

## 2020-09-11 PROCEDURE — 2709999900 HC NON-CHARGEABLE SUPPLY: Performed by: SURGERY

## 2020-09-11 PROCEDURE — 3600000004 HC SURGERY LEVEL 4 BASE: Performed by: SURGERY

## 2020-09-11 PROCEDURE — 99223 1ST HOSP IP/OBS HIGH 75: CPT | Performed by: SURGERY

## 2020-09-11 PROCEDURE — 7100000001 HC PACU RECOVERY - ADDTL 15 MIN: Performed by: SURGERY

## 2020-09-11 PROCEDURE — 2720000010 HC SURG SUPPLY STERILE: Performed by: SURGERY

## 2020-09-11 PROCEDURE — 2580000003 HC RX 258: Performed by: EMERGENCY MEDICINE

## 2020-09-11 PROCEDURE — 6360000004 HC RX CONTRAST MEDICATION: Performed by: NURSE PRACTITIONER

## 2020-09-11 PROCEDURE — 6370000000 HC RX 637 (ALT 250 FOR IP): Performed by: SURGERY

## 2020-09-11 PROCEDURE — 85610 PROTHROMBIN TIME: CPT

## 2020-09-11 PROCEDURE — 2580000003 HC RX 258: Performed by: NURSE PRACTITIONER

## 2020-09-11 PROCEDURE — 86850 RBC ANTIBODY SCREEN: CPT

## 2020-09-11 PROCEDURE — 7100000000 HC PACU RECOVERY - FIRST 15 MIN: Performed by: SURGERY

## 2020-09-11 PROCEDURE — 86901 BLOOD TYPING SEROLOGIC RH(D): CPT

## 2020-09-11 PROCEDURE — 6360000002 HC RX W HCPCS: Performed by: SURGERY

## 2020-09-11 PROCEDURE — 1200000000 HC SEMI PRIVATE

## 2020-09-11 PROCEDURE — 80053 COMPREHEN METABOLIC PANEL: CPT

## 2020-09-11 PROCEDURE — 93010 ELECTROCARDIOGRAM REPORT: CPT | Performed by: INTERNAL MEDICINE

## 2020-09-11 PROCEDURE — 86900 BLOOD TYPING SEROLOGIC ABO: CPT

## 2020-09-11 PROCEDURE — 3700000000 HC ANESTHESIA ATTENDED CARE: Performed by: SURGERY

## 2020-09-11 PROCEDURE — 6360000002 HC RX W HCPCS: Performed by: NURSE PRACTITIONER

## 2020-09-11 PROCEDURE — 3600000014 HC SURGERY LEVEL 4 ADDTL 15MIN: Performed by: SURGERY

## 2020-09-11 PROCEDURE — 99285 EMERGENCY DEPT VISIT HI MDM: CPT

## 2020-09-11 PROCEDURE — 88304 TISSUE EXAM BY PATHOLOGIST: CPT

## 2020-09-11 PROCEDURE — 6360000002 HC RX W HCPCS: Performed by: NURSE ANESTHETIST, CERTIFIED REGISTERED

## 2020-09-11 PROCEDURE — 36415 COLL VENOUS BLD VENIPUNCTURE: CPT

## 2020-09-11 PROCEDURE — 83690 ASSAY OF LIPASE: CPT

## 2020-09-11 PROCEDURE — 96365 THER/PROPH/DIAG IV INF INIT: CPT

## 2020-09-11 PROCEDURE — 0DTJ4ZZ RESECTION OF APPENDIX, PERCUTANEOUS ENDOSCOPIC APPROACH: ICD-10-PCS | Performed by: SURGERY

## 2020-09-11 PROCEDURE — 74177 CT ABD & PELVIS W/CONTRAST: CPT

## 2020-09-11 PROCEDURE — 44970 LAPAROSCOPY APPENDECTOMY: CPT | Performed by: SURGERY

## 2020-09-11 PROCEDURE — 93005 ELECTROCARDIOGRAM TRACING: CPT | Performed by: EMERGENCY MEDICINE

## 2020-09-11 PROCEDURE — 96375 TX/PRO/DX INJ NEW DRUG ADDON: CPT

## 2020-09-11 PROCEDURE — 85025 COMPLETE CBC W/AUTO DIFF WBC: CPT

## 2020-09-11 PROCEDURE — 81003 URINALYSIS AUTO W/O SCOPE: CPT

## 2020-09-11 PROCEDURE — 2500000003 HC RX 250 WO HCPCS: Performed by: NURSE ANESTHETIST, CERTIFIED REGISTERED

## 2020-09-11 PROCEDURE — 2500000003 HC RX 250 WO HCPCS: Performed by: SURGERY

## 2020-09-11 PROCEDURE — 2580000003 HC RX 258: Performed by: SURGERY

## 2020-09-11 PROCEDURE — 87040 BLOOD CULTURE FOR BACTERIA: CPT

## 2020-09-11 PROCEDURE — 94761 N-INVAS EAR/PLS OXIMETRY MLT: CPT

## 2020-09-11 RX ORDER — ONDANSETRON 2 MG/ML
4 INJECTION INTRAMUSCULAR; INTRAVENOUS EVERY 6 HOURS PRN
Status: DISCONTINUED | OUTPATIENT
Start: 2020-09-11 | End: 2020-09-25 | Stop reason: HOSPADM

## 2020-09-11 RX ORDER — LEVOTHYROXINE SODIUM 112 UG/1
112 TABLET ORAL DAILY
Status: DISCONTINUED | OUTPATIENT
Start: 2020-09-11 | End: 2020-09-25 | Stop reason: HOSPADM

## 2020-09-11 RX ORDER — OXYCODONE HYDROCHLORIDE 5 MG/1
10 TABLET ORAL EVERY 4 HOURS PRN
Status: DISCONTINUED | OUTPATIENT
Start: 2020-09-11 | End: 2020-09-12

## 2020-09-11 RX ORDER — ACETAMINOPHEN 325 MG/1
650 TABLET ORAL EVERY 4 HOURS PRN
Status: DISCONTINUED | OUTPATIENT
Start: 2020-09-11 | End: 2020-09-25 | Stop reason: HOSPADM

## 2020-09-11 RX ORDER — PROMETHAZINE HYDROCHLORIDE 25 MG/ML
6.25 INJECTION, SOLUTION INTRAMUSCULAR; INTRAVENOUS
Status: DISCONTINUED | OUTPATIENT
Start: 2020-09-11 | End: 2020-09-11 | Stop reason: HOSPADM

## 2020-09-11 RX ORDER — ROCURONIUM BROMIDE 10 MG/ML
INJECTION, SOLUTION INTRAVENOUS PRN
Status: DISCONTINUED | OUTPATIENT
Start: 2020-09-11 | End: 2020-09-11 | Stop reason: SDUPTHER

## 2020-09-11 RX ORDER — AZELASTINE 1 MG/ML
1 SPRAY, METERED NASAL 2 TIMES DAILY
Status: DISCONTINUED | OUTPATIENT
Start: 2020-09-11 | End: 2020-09-25 | Stop reason: HOSPADM

## 2020-09-11 RX ORDER — LABETALOL HYDROCHLORIDE 5 MG/ML
5 INJECTION, SOLUTION INTRAVENOUS EVERY 10 MIN PRN
Status: DISCONTINUED | OUTPATIENT
Start: 2020-09-11 | End: 2020-09-11 | Stop reason: HOSPADM

## 2020-09-11 RX ORDER — HYDROMORPHONE HCL 110MG/55ML
1 PATIENT CONTROLLED ANALGESIA SYRINGE INTRAVENOUS
Status: DISCONTINUED | OUTPATIENT
Start: 2020-09-11 | End: 2020-09-21

## 2020-09-11 RX ORDER — MONTELUKAST SODIUM 10 MG/1
10 TABLET ORAL NIGHTLY
Status: DISCONTINUED | OUTPATIENT
Start: 2020-09-11 | End: 2020-09-25 | Stop reason: HOSPADM

## 2020-09-11 RX ORDER — BUPIVACAINE HYDROCHLORIDE AND EPINEPHRINE 5; 5 MG/ML; UG/ML
INJECTION, SOLUTION PERINEURAL PRN
Status: DISCONTINUED | OUTPATIENT
Start: 2020-09-11 | End: 2020-09-11 | Stop reason: ALTCHOICE

## 2020-09-11 RX ORDER — SUCCINYLCHOLINE CHLORIDE 20 MG/ML
INJECTION INTRAMUSCULAR; INTRAVENOUS PRN
Status: DISCONTINUED | OUTPATIENT
Start: 2020-09-11 | End: 2020-09-11 | Stop reason: SDUPTHER

## 2020-09-11 RX ORDER — OXYCODONE HYDROCHLORIDE AND ACETAMINOPHEN 5; 325 MG/1; MG/1
2 TABLET ORAL PRN
Status: DISCONTINUED | OUTPATIENT
Start: 2020-09-11 | End: 2020-09-11 | Stop reason: HOSPADM

## 2020-09-11 RX ORDER — MEPERIDINE HYDROCHLORIDE 50 MG/ML
12.5 INJECTION INTRAMUSCULAR; INTRAVENOUS; SUBCUTANEOUS EVERY 5 MIN PRN
Status: DISCONTINUED | OUTPATIENT
Start: 2020-09-11 | End: 2020-09-11 | Stop reason: HOSPADM

## 2020-09-11 RX ORDER — DIPHENHYDRAMINE HYDROCHLORIDE 50 MG/ML
12.5 INJECTION INTRAMUSCULAR; INTRAVENOUS
Status: DISCONTINUED | OUTPATIENT
Start: 2020-09-11 | End: 2020-09-11 | Stop reason: HOSPADM

## 2020-09-11 RX ORDER — PHENYLEPHRINE HCL IN 0.9% NACL 1 MG/10 ML
SYRINGE (ML) INTRAVENOUS PRN
Status: DISCONTINUED | OUTPATIENT
Start: 2020-09-11 | End: 2020-09-11 | Stop reason: SDUPTHER

## 2020-09-11 RX ORDER — SODIUM CHLORIDE, SODIUM LACTATE, POTASSIUM CHLORIDE, CALCIUM CHLORIDE 600; 310; 30; 20 MG/100ML; MG/100ML; MG/100ML; MG/100ML
1000 INJECTION, SOLUTION INTRAVENOUS CONTINUOUS
Status: DISCONTINUED | OUTPATIENT
Start: 2020-09-11 | End: 2020-09-15

## 2020-09-11 RX ORDER — METOPROLOL SUCCINATE 50 MG/1
200 TABLET, EXTENDED RELEASE ORAL DAILY
Status: DISCONTINUED | OUTPATIENT
Start: 2020-09-12 | End: 2020-09-17

## 2020-09-11 RX ORDER — SODIUM CHLORIDE 0.9 % (FLUSH) 0.9 %
10 SYRINGE (ML) INJECTION EVERY 12 HOURS SCHEDULED
Status: DISCONTINUED | OUTPATIENT
Start: 2020-09-11 | End: 2020-09-25 | Stop reason: HOSPADM

## 2020-09-11 RX ORDER — ATORVASTATIN CALCIUM 10 MG/1
10 TABLET, FILM COATED ORAL DAILY
Status: DISCONTINUED | OUTPATIENT
Start: 2020-09-11 | End: 2020-09-25 | Stop reason: HOSPADM

## 2020-09-11 RX ORDER — KETOROLAC TROMETHAMINE 30 MG/ML
INJECTION, SOLUTION INTRAMUSCULAR; INTRAVENOUS PRN
Status: DISCONTINUED | OUTPATIENT
Start: 2020-09-11 | End: 2020-09-11 | Stop reason: SDUPTHER

## 2020-09-11 RX ORDER — TORSEMIDE 20 MG/1
20 TABLET ORAL DAILY
Status: DISCONTINUED | OUTPATIENT
Start: 2020-09-11 | End: 2020-09-25 | Stop reason: HOSPADM

## 2020-09-11 RX ORDER — 0.9 % SODIUM CHLORIDE 0.9 %
1000 INTRAVENOUS SOLUTION INTRAVENOUS ONCE
Status: COMPLETED | OUTPATIENT
Start: 2020-09-11 | End: 2020-09-11

## 2020-09-11 RX ORDER — SPIRONOLACTONE 25 MG/1
25 TABLET ORAL DAILY
Status: DISCONTINUED | OUTPATIENT
Start: 2020-09-11 | End: 2020-09-25 | Stop reason: HOSPADM

## 2020-09-11 RX ORDER — DEXAMETHASONE SODIUM PHOSPHATE 4 MG/ML
INJECTION, SOLUTION INTRA-ARTICULAR; INTRALESIONAL; INTRAMUSCULAR; INTRAVENOUS; SOFT TISSUE PRN
Status: DISCONTINUED | OUTPATIENT
Start: 2020-09-11 | End: 2020-09-11 | Stop reason: SDUPTHER

## 2020-09-11 RX ORDER — OXYCODONE HYDROCHLORIDE 5 MG/1
5 TABLET ORAL EVERY 4 HOURS PRN
Status: DISCONTINUED | OUTPATIENT
Start: 2020-09-11 | End: 2020-09-12

## 2020-09-11 RX ORDER — MORPHINE SULFATE 2 MG/ML
2 INJECTION, SOLUTION INTRAMUSCULAR; INTRAVENOUS EVERY 5 MIN PRN
Status: DISCONTINUED | OUTPATIENT
Start: 2020-09-11 | End: 2020-09-11 | Stop reason: HOSPADM

## 2020-09-11 RX ORDER — ONDANSETRON 2 MG/ML
4 INJECTION INTRAMUSCULAR; INTRAVENOUS
Status: DISCONTINUED | OUTPATIENT
Start: 2020-09-11 | End: 2020-09-11 | Stop reason: HOSPADM

## 2020-09-11 RX ORDER — ONDANSETRON 2 MG/ML
4 INJECTION INTRAMUSCULAR; INTRAVENOUS EVERY 30 MIN PRN
Status: DISCONTINUED | OUTPATIENT
Start: 2020-09-11 | End: 2020-09-25 | Stop reason: HOSPADM

## 2020-09-11 RX ORDER — MORPHINE SULFATE 2 MG/ML
1 INJECTION, SOLUTION INTRAMUSCULAR; INTRAVENOUS EVERY 5 MIN PRN
Status: DISCONTINUED | OUTPATIENT
Start: 2020-09-11 | End: 2020-09-11 | Stop reason: HOSPADM

## 2020-09-11 RX ORDER — ONDANSETRON 2 MG/ML
INJECTION INTRAMUSCULAR; INTRAVENOUS PRN
Status: DISCONTINUED | OUTPATIENT
Start: 2020-09-11 | End: 2020-09-11 | Stop reason: SDUPTHER

## 2020-09-11 RX ORDER — DIGOXIN 125 MCG
125 TABLET ORAL DAILY
Status: DISCONTINUED | OUTPATIENT
Start: 2020-09-11 | End: 2020-09-18

## 2020-09-11 RX ORDER — PROPOFOL 10 MG/ML
INJECTION, EMULSION INTRAVENOUS PRN
Status: DISCONTINUED | OUTPATIENT
Start: 2020-09-11 | End: 2020-09-11 | Stop reason: SDUPTHER

## 2020-09-11 RX ORDER — HYDRALAZINE HYDROCHLORIDE 20 MG/ML
5 INJECTION INTRAMUSCULAR; INTRAVENOUS EVERY 10 MIN PRN
Status: DISCONTINUED | OUTPATIENT
Start: 2020-09-11 | End: 2020-09-11 | Stop reason: HOSPADM

## 2020-09-11 RX ORDER — OXYCODONE HYDROCHLORIDE AND ACETAMINOPHEN 5; 325 MG/1; MG/1
1 TABLET ORAL PRN
Status: DISCONTINUED | OUTPATIENT
Start: 2020-09-11 | End: 2020-09-11 | Stop reason: HOSPADM

## 2020-09-11 RX ORDER — LIDOCAINE HYDROCHLORIDE 20 MG/ML
INJECTION, SOLUTION INFILTRATION; PERINEURAL PRN
Status: DISCONTINUED | OUTPATIENT
Start: 2020-09-11 | End: 2020-09-11 | Stop reason: SDUPTHER

## 2020-09-11 RX ORDER — SODIUM CHLORIDE 0.9 % (FLUSH) 0.9 %
10 SYRINGE (ML) INJECTION PRN
Status: DISCONTINUED | OUTPATIENT
Start: 2020-09-11 | End: 2020-09-25 | Stop reason: HOSPADM

## 2020-09-11 RX ORDER — SODIUM CHLORIDE 9 MG/ML
INJECTION, SOLUTION INTRAVENOUS CONTINUOUS
Status: DISCONTINUED | OUTPATIENT
Start: 2020-09-11 | End: 2020-09-24

## 2020-09-11 RX ORDER — ALLOPURINOL 100 MG/1
100 TABLET ORAL DAILY
Status: DISCONTINUED | OUTPATIENT
Start: 2020-09-11 | End: 2020-09-25 | Stop reason: HOSPADM

## 2020-09-11 RX ORDER — FENTANYL CITRATE 50 UG/ML
INJECTION, SOLUTION INTRAMUSCULAR; INTRAVENOUS PRN
Status: DISCONTINUED | OUTPATIENT
Start: 2020-09-11 | End: 2020-09-11 | Stop reason: SDUPTHER

## 2020-09-11 RX ORDER — GABAPENTIN 300 MG/1
300 CAPSULE ORAL 3 TIMES DAILY
Status: DISCONTINUED | OUTPATIENT
Start: 2020-09-11 | End: 2020-09-25 | Stop reason: HOSPADM

## 2020-09-11 RX ORDER — CLONAZEPAM 0.5 MG/1
0.5 TABLET ORAL 2 TIMES DAILY PRN
Status: DISCONTINUED | OUTPATIENT
Start: 2020-09-11 | End: 2020-09-25 | Stop reason: HOSPADM

## 2020-09-11 RX ORDER — CETIRIZINE HYDROCHLORIDE 10 MG/1
10 TABLET ORAL DAILY
Status: DISCONTINUED | OUTPATIENT
Start: 2020-09-11 | End: 2020-09-25 | Stop reason: HOSPADM

## 2020-09-11 RX ORDER — MORPHINE SULFATE 4 MG/ML
4 INJECTION, SOLUTION INTRAMUSCULAR; INTRAVENOUS EVERY 30 MIN PRN
Status: DISCONTINUED | OUTPATIENT
Start: 2020-09-11 | End: 2020-09-21

## 2020-09-11 RX ORDER — PROMETHAZINE HYDROCHLORIDE 25 MG/1
12.5 TABLET ORAL EVERY 6 HOURS PRN
Status: DISCONTINUED | OUTPATIENT
Start: 2020-09-11 | End: 2020-09-25 | Stop reason: HOSPADM

## 2020-09-11 RX ORDER — ESMOLOL HYDROCHLORIDE 10 MG/ML
INJECTION INTRAVENOUS PRN
Status: DISCONTINUED | OUTPATIENT
Start: 2020-09-11 | End: 2020-09-11 | Stop reason: SDUPTHER

## 2020-09-11 RX ADMIN — KETOROLAC TROMETHAMINE 15 MG: 30 INJECTION, SOLUTION INTRAMUSCULAR; INTRAVENOUS at 17:03

## 2020-09-11 RX ADMIN — IOPAMIDOL 75 ML: 755 INJECTION, SOLUTION INTRAVENOUS at 11:29

## 2020-09-11 RX ADMIN — ATORVASTATIN CALCIUM 10 MG: 10 TABLET, FILM COATED ORAL at 21:20

## 2020-09-11 RX ADMIN — ONDANSETRON 4 MG: 2 INJECTION INTRAMUSCULAR; INTRAVENOUS at 11:44

## 2020-09-11 RX ADMIN — SUGAMMADEX 200 MG: 100 INJECTION, SOLUTION INTRAVENOUS at 17:10

## 2020-09-11 RX ADMIN — MONTELUKAST SODIUM 10 MG: 10 TABLET, FILM COATED ORAL at 21:20

## 2020-09-11 RX ADMIN — LIDOCAINE HYDROCHLORIDE 100 MG: 20 INJECTION, SOLUTION INFILTRATION; PERINEURAL at 15:53

## 2020-09-11 RX ADMIN — GABAPENTIN 300 MG: 300 CAPSULE ORAL at 21:20

## 2020-09-11 RX ADMIN — MEROPENEM 1 G: 1 INJECTION, POWDER, FOR SOLUTION INTRAVENOUS at 21:20

## 2020-09-11 RX ADMIN — SODIUM CHLORIDE: 9 INJECTION, SOLUTION INTRAVENOUS at 21:20

## 2020-09-11 RX ADMIN — ROCURONIUM BROMIDE 25 MG: 10 SOLUTION INTRAVENOUS at 16:40

## 2020-09-11 RX ADMIN — CETIRIZINE HYDROCHLORIDE 10 MG: 10 TABLET, FILM COATED ORAL at 21:20

## 2020-09-11 RX ADMIN — Medication 100 MCG: at 16:17

## 2020-09-11 RX ADMIN — ROCURONIUM BROMIDE 25 MG: 10 SOLUTION INTRAVENOUS at 16:10

## 2020-09-11 RX ADMIN — SUCCINYLCHOLINE CHLORIDE 180 MG: 20 INJECTION, SOLUTION INTRAMUSCULAR; INTRAVENOUS at 15:56

## 2020-09-11 RX ADMIN — FENTANYL CITRATE 100 MCG: 50 INJECTION, SOLUTION INTRAMUSCULAR; INTRAVENOUS at 15:53

## 2020-09-11 RX ADMIN — SUGAMMADEX 200 MG: 100 INJECTION, SOLUTION INTRAVENOUS at 17:12

## 2020-09-11 RX ADMIN — SODIUM CHLORIDE, POTASSIUM CHLORIDE, SODIUM LACTATE AND CALCIUM CHLORIDE 1000 ML: 600; 310; 30; 20 INJECTION, SOLUTION INTRAVENOUS at 14:01

## 2020-09-11 RX ADMIN — SODIUM CHLORIDE, POTASSIUM CHLORIDE, SODIUM LACTATE AND CALCIUM CHLORIDE: 600; 310; 30; 20 INJECTION, SOLUTION INTRAVENOUS at 15:48

## 2020-09-11 RX ADMIN — PIPERACILLIN AND TAZOBACTAM 3.38 G: 3; .375 INJECTION, POWDER, FOR SOLUTION INTRAVENOUS at 13:58

## 2020-09-11 RX ADMIN — Medication 100 MCG: at 16:08

## 2020-09-11 RX ADMIN — FAMOTIDINE 20 MG: 10 INJECTION INTRAVENOUS at 21:20

## 2020-09-11 RX ADMIN — SODIUM CHLORIDE 1000 ML: 9 INJECTION, SOLUTION INTRAVENOUS at 11:44

## 2020-09-11 RX ADMIN — ONDANSETRON 4 MG: 2 INJECTION INTRAMUSCULAR; INTRAVENOUS at 16:06

## 2020-09-11 RX ADMIN — DEXAMETHASONE SODIUM PHOSPHATE 8 MG: 4 INJECTION, SOLUTION INTRAMUSCULAR; INTRAVENOUS at 16:06

## 2020-09-11 RX ADMIN — Medication 100 MCG: at 16:10

## 2020-09-11 RX ADMIN — ROCURONIUM BROMIDE 50 MG: 10 SOLUTION INTRAVENOUS at 16:01

## 2020-09-11 RX ADMIN — FENTANYL CITRATE 50 MCG: 50 INJECTION, SOLUTION INTRAMUSCULAR; INTRAVENOUS at 16:15

## 2020-09-11 RX ADMIN — ESMOLOL HYDROCHLORIDE 20 MG: 10 INJECTION, SOLUTION INTRAVENOUS at 16:17

## 2020-09-11 RX ADMIN — PROPOFOL 300 MG: 10 INJECTION, EMULSION INTRAVENOUS at 15:56

## 2020-09-11 ASSESSMENT — PULMONARY FUNCTION TESTS
PIF_VALUE: 25
PIF_VALUE: 21
PIF_VALUE: 30
PIF_VALUE: 1
PIF_VALUE: 14
PIF_VALUE: 34
PIF_VALUE: 29
PIF_VALUE: 34
PIF_VALUE: 6
PIF_VALUE: 24
PIF_VALUE: 7
PIF_VALUE: 38
PIF_VALUE: 30
PIF_VALUE: 34
PIF_VALUE: 3
PIF_VALUE: 11
PIF_VALUE: 32
PIF_VALUE: 27
PIF_VALUE: 34
PIF_VALUE: 35
PIF_VALUE: 35
PIF_VALUE: 34
PIF_VALUE: 24
PIF_VALUE: 34
PIF_VALUE: 34
PIF_VALUE: 4
PIF_VALUE: 27
PIF_VALUE: 32
PIF_VALUE: 37
PIF_VALUE: 34
PIF_VALUE: 0
PIF_VALUE: 19
PIF_VALUE: 34
PIF_VALUE: 34
PIF_VALUE: 7
PIF_VALUE: 34
PIF_VALUE: 32
PIF_VALUE: 27
PIF_VALUE: 34
PIF_VALUE: 27
PIF_VALUE: 34
PIF_VALUE: 0
PIF_VALUE: 34
PIF_VALUE: 30
PIF_VALUE: 34
PIF_VALUE: 23
PIF_VALUE: 34
PIF_VALUE: 27
PIF_VALUE: 23
PIF_VALUE: 31
PIF_VALUE: 34
PIF_VALUE: 1
PIF_VALUE: 1
PIF_VALUE: 34
PIF_VALUE: 34
PIF_VALUE: 27
PIF_VALUE: 1
PIF_VALUE: 34
PIF_VALUE: 6
PIF_VALUE: 24
PIF_VALUE: 21
PIF_VALUE: 1
PIF_VALUE: 34
PIF_VALUE: 23
PIF_VALUE: 34
PIF_VALUE: 27
PIF_VALUE: 1
PIF_VALUE: 27
PIF_VALUE: 34
PIF_VALUE: 24

## 2020-09-11 ASSESSMENT — PAIN DESCRIPTION - ONSET: ONSET: OTHER (COMMENT)

## 2020-09-11 ASSESSMENT — PAIN SCALES - GENERAL
PAINLEVEL_OUTOF10: 8
PAINLEVEL_OUTOF10: 0

## 2020-09-11 ASSESSMENT — PAIN DESCRIPTION - PAIN TYPE
TYPE: ACUTE PAIN
TYPE: OTHER (COMMENT)

## 2020-09-11 ASSESSMENT — PAIN DESCRIPTION - LOCATION
LOCATION: ABDOMEN
LOCATION: OTHER (COMMENT)

## 2020-09-11 ASSESSMENT — PAIN DESCRIPTION - ORIENTATION
ORIENTATION: RIGHT
ORIENTATION: OTHER (COMMENT)

## 2020-09-11 ASSESSMENT — PAIN DESCRIPTION - FREQUENCY: FREQUENCY: OTHER (COMMENT)

## 2020-09-11 ASSESSMENT — PAIN DESCRIPTION - DESCRIPTORS
DESCRIPTORS: CONSTANT;CRAMPING
DESCRIPTORS: OTHER (COMMENT)

## 2020-09-11 NOTE — ED NOTES
All belongings accounted for with patient, RN and Selena Dhillon in security. Patient belonging sheet filled out and all items taken to be locked up with security.       Kusum Hendrix RN  09/11/20 6017

## 2020-09-11 NOTE — ANESTHESIA PRE PROCEDURE
Department of Anesthesiology  Preprocedure Note       Name:  Julissa Hendrix   Age:  60 y.o.  :  1955                                          MRN:  [id number]         Date:  2020      Surgeon: Brandon Rios):  Socorro Ramírez MD    Procedure: Procedure(s):  LAPAROSCOPIC APPENDECTOMY, POSSIBLE OPEN PROCEDURE    Medications prior to admission:   Prior to Admission medications    Medication Sig Start Date End Date Taking? Authorizing Provider   ALLOPURINOL PO Take 100 mg by mouth daily    Yes Historical Provider, MD   testosterone (ANDROGEL; TESTIM) 50 MG/5GM (1%) GEL 1% gel Apply topically daily. Yes Historical Provider, MD   spironolactone (ALDACTONE) 25 MG tablet Take 25 mg by mouth daily   Yes Historical Provider, MD   atorvastatin (LIPITOR) 10 MG tablet Take 10 mg by mouth daily    Yes Historical Provider, MD   clonazePAM (KLONOPIN) 1 MG tablet Take one-half tablet by mouth twice a day as needed for anxiety   Yes Historical Provider, MD   digoxin (LANOXIN) 250 MCG tablet Take 125 mcg by mouth daily  17  Yes Historical Provider, MD   gabapentin (NEURONTIN) 300 MG capsule Take 300 mg by mouth 3 times daily.     Yes Historical Provider, MD   Levothyroxine Sodium 112 MCG CAPS Take 112 mcg by mouth daily    Yes Historical Provider, MD   lisinopril (PRINIVIL;ZESTRIL) 40 MG tablet Take 30 mg by mouth daily  3/8/17  Yes Historical Provider, MD   loratadine (CLARITIN) 10 MG tablet Take 10 mg by mouth daily    Yes Historical Provider, MD   metoprolol succinate (TOPROL XL) 50 MG extended release tablet Take 200 mg by mouth daily  17  Yes Historical Provider, MD   montelukast (SINGULAIR) 10 MG tablet Take 10 mg by mouth nightly    Yes Historical Provider, MD   torsemide (DEMADEX) 20 MG tablet Take 20 mg by mouth daily  16  Yes Historical Provider, MD   vardenafil (LEVITRA) 20 MG tablet Take 1/2 tablet by mouth one hour before sexual activity   Yes Historical Provider, MD   warfarin (COUMADIN) 5 MG tablet 5 mg Indications: 2 5mg tablet every day but wednesday and on wednesday take 12.5 mg    Yes Historical Provider, MD   azelastine (ASTELIN) 0.1 % nasal spray 1 spray by Nasal route 2 times daily Use in each nostril as directed   Yes Historical Provider, MD       Current medications:    Current Facility-Administered Medications   Medication Dose Route Frequency Provider Last Rate Last Dose    morphine (PF) injection 4 mg  4 mg Intravenous Q30 Min PRN Harriett Zayas APRN - CNP        ondansetron (ZOFRAN) injection 4 mg  4 mg Intravenous Q30 Min PRN Harriett Zayas APRN - CNP   4 mg at 09/11/20 1144    lactated ringers infusion 1,000 mL  1,000 mL Intravenous Continuous Dennis Chery  mL/hr at 09/11/20 1401 1,000 mL at 09/11/20 1401     Facility-Administered Medications Ordered in Other Encounters   Medication Dose Route Frequency Provider Last Rate Last Dose    lidocaine (LMX) 4 % cream   Topical Once Dinorah Joel DPM           Allergies:     Allergies   Allergen Reactions    Pravastatin      Patient does recall reaction       Problem List:    Patient Active Problem List   Diagnosis Code    Primary osteoarthritis of both knees M17.0    Lymphedema I89.0    Other specified peripheral vascular diseases ([address] I73.89    Non-pressure chronic ulcer of left calf with fat layer exposed ([address] L97.222    Morbid obesity ([address] E66.01    Peripheral venous insufficiency I87.2    Acute appendicitis with localized peritonitis, without perforation, abscess, or gangrene K35.30       Past Medical History:        Diagnosis Date    Asthma     Atrial fibrillation ([address]     Diabetes mellitus ([address]     Hyperlipidemia     Hypertension     Thyroid disease        Past Surgical History:        Procedure Laterality Date    ABLATION OF DYSRHYTHMIC FOCUS  2009 AND 2009    TIMES TWO    CARDIAC CATHETERIZATION Left 2015    CARDIAC DEFIBRILLATOR PLACEMENT  2016       Social History:    Social History     Tobacco Use    Smoking status: Never Smoker    Smokeless tobacco: Former User     Types: Snuff   Substance Use Topics    Alcohol use: No                                Counseling given: Not Answered      Vital Signs (Current):   Vitals:    09/11/20 1149 09/11/20 1302 09/11/20 1331 09/11/20 1411   BP: 120/72 132/68  104/61   Pulse: 107 108 115 109   Resp: 17 18 17 19   Temp:    100.2 °F (37.9 °C)   TempSrc:    Oral   SpO2: 96% 98% 99% 98%   Weight:       Height:                                                  BP Readings from Last 3 Encounters:   09/11/20 104/61   08/30/20 112/74   03/10/20 112/70       NPO Status: Time of last liquid consumption: 0730                        Time of last solid consumption: 1900                        Date of last liquid consumption: 09/11/20                        Date of last solid food consumption: 09/10/20    BMI:   Wt Readings from Last 3 Encounters:   09/11/20 (!) 420 lb (190.5 kg)   08/30/20 (!) 420 lb (190.5 kg)   03/10/20 (!) 411 lb 12.8 oz (186.8 kg)     Body mass index is 49.8 kg/m². CBC:   Lab Results   Component Value Date    WBC 11.9 09/11/2020    RBC 4.13 09/11/2020    HGB 12.4 09/11/2020    HCT 37.9 09/11/2020    MCV 91.9 09/11/2020    RDW 15.0 09/11/2020     09/11/2020       CMP:   Lab Results   Component Value Date     09/11/2020    K 4.8 09/11/2020    CL 98 09/11/2020    CO2 27 09/11/2020    BUN 16 09/11/2020    CREATININE 0.9 09/11/2020    GFRAA >60 09/11/2020    AGRATIO 1.2 09/11/2020    LABGLOM >60 09/11/2020    GLUCOSE 133 09/11/2020    PROT 7.6 09/11/2020    CALCIUM 9.7 09/11/2020    BILITOT 1.2 09/11/2020    ALKPHOS 75 09/11/2020    AST 31 09/11/2020    ALT 29 09/11/2020       POC Tests: No results for input(s): POCGLU, POCNA, POCK, POCCL, POCBUN, POCHEMO, POCHCT in the last 72 hours.     Coags:   Lab Results   Component Value Date    PROTIME 26.1 09/11/2020    INR 2.23 09/11/2020    APTT 34.6 02/13/2019       HCG (If Applicable): No results found for: PREGTESTUR, PREGSERUM, HCG, HCGQUANT     ABGs: No results found for: PHART, PO2ART, CEH6IJP, EUT1TGW, BEART, S2NSBKAW     Type & Screen (If Applicable):  No results found for: LABABO, LABRH    Drug/Infectious Status (If Applicable):  No results found for: HIV, HEPCAB    COVID-19 Screening (If Applicable): No results found for: COVID19      Anesthesia Evaluation   no history of anesthetic complications:   Airway: Mallampati: III  TM distance: <3 FB   Neck ROM: limited  Mouth opening: > = 3 FB Dental:          Pulmonary:   (+) asthma:                            Cardiovascular:    (+) hypertension:, pacemaker: AICD, dysrhythmias: atrial fibrillation,                   Neuro/Psych:   Negative Neuro/Psych ROS              GI/Hepatic/Renal:   (+) morbid obesity         ROS comment: Acute appy. Endo/Other:    (+) DiabetesType II DM, , hypothyroidism: arthritis: OA., .                 Abdominal:           Vascular: negative vascular ROS. Anesthesia Plan      general     ASA 4 - emergent     (Pt agrees to risks, benefits and alternatives of GETA. Questions answered. Willing to proceed with plan.)  Induction: intravenous. Anesthetic plan and risks discussed with patient.                       Olinda Fernandez MD   9/11/2020

## 2020-09-11 NOTE — ED NOTES
Called Gen Surg @ [phone]  Re: Acute appendicitis  Dr Alan Yen responded @ [address]  09/11/20 8363

## 2020-09-11 NOTE — ED PROVIDER NOTES
Emergency Department Attending Provider Note  Location: [address] [place] [address]  ED  9/11/2020     Patient Identification  Pauline Chan is a 60 y.o. male      Pauline Chan was evaluated in the Emergency Department for abdominal pain rated to the right lower quadrant. Morbidly obese. Nausea no vomiting. Collette Ruts Physical exam revealed:  Vital signs reviewed  Gen: Alert and oriented, no acute distress  Card: RRR, no murmurs, equal radial pulses  Resp: CBSBL, no wheezes rales or rhonchi  Abd: Obese abdomen, tender in the right lower quadrant with guarding, no CVA tenderness  Ext: No deformities noted  Neuro: Grossly normal moving extremities equally      EKG Interpretation  A. fib rate approximately 100, normal axis, , no diagnostic ischemic changes noted. Patient seen and evaluated. Relevant records reviewed. Morbidly obese 60-year-old male presents with abdominal pain now radiating to the right lower quadrant. On exam he is uncomfortable but nontoxic-appearing, his vitals are notable for mild tachycardia. On exam he has tenderness with guarding right lower quadrant. CT abdomen pelvis consistent with acute appendicitis without rupture. He has a mild leukocytosis he is afebrile. Will discuss with surgery and plan for admission. Although initial history and physical exam information was obtained by RENETTA/NPP (who also dictated a record of this visit), I independently examined and evaluated this patient and made all diagnostic, treatment, and disposition decisions. This chart was generated in part by using Dragon Dictation system and may contain errors related to that system including errors in grammar, punctuation, and spelling, as well as words and phrases that may be inappropriate. If there are any questions or concerns please feel free to contact the dictating provider for clarification.      Lazarus Shear, MD [address], MD  09/11/20 8744

## 2020-09-11 NOTE — ED PROVIDER NOTES
Take 10 mg by mouth daily       clonazePAM (KLONOPIN) 1 MG tablet Take one-half tablet by mouth twice a day as needed for anxiety      digoxin (LANOXIN) 250 MCG tablet Take 125 mcg by mouth daily       gabapentin (NEURONTIN) 300 MG capsule Take 300 mg by mouth 3 times daily.        Levothyroxine Sodium 112 MCG CAPS Take 112 mcg by mouth daily       lisinopril (PRINIVIL;ZESTRIL) 40 MG tablet Take 30 mg by mouth daily       loratadine (CLARITIN) 10 MG tablet Take 10 mg by mouth daily       metoprolol succinate (TOPROL XL) 50 MG extended release tablet Take 200 mg by mouth daily       montelukast (SINGULAIR) 10 MG tablet Take 10 mg by mouth nightly       torsemide (DEMADEX) 20 MG tablet Take 20 mg by mouth daily       vardenafil (LEVITRA) 20 MG tablet Take 1/2 tablet by mouth one hour before sexual activity      warfarin (COUMADIN) 5 MG tablet 5 mg Indications: 2 5mg tablet every day but wednesday and on wednesday take 12.5 mg       azelastine (ASTELIN) 0.1 % nasal spray 1 spray by Nasal route 2 times daily Use in each nostril as directed         ALLERGIES    Allergies   Allergen Reactions    Pravastatin      Patient does recall reaction       FAMILY HISTORY    Family History   Problem Relation Age of Onset    Cancer Mother     Cancer Father        SOCIAL HISTORY    Social History     Socioeconomic History    Marital status:      Spouse name: None    Number of children: None    Years of education: None    Highest education level: None   Occupational History    None   Social Needs    Financial resource strain: None    Food insecurity     Worry: None     Inability: None    Transportation needs     Medical: None     Non-medical: None   Tobacco Use    Smoking status: Never Smoker    Smokeless tobacco: Former User     Types: Snuff   Substance and Sexual Activity    Alcohol use: No    Drug use: No    Sexual activity: Yes     Partners: Female   Lifestyle    Physical activity     Days per week: None     Minutes per session: None    Stress: None   Relationships    Social connections     Talks on phone: None     Gets together: None     Attends [employer] service: None     Active member of club or organization: None     Attends meetings of clubs or organizations: None     Relationship status: None    Intimate partner violence     Fear of current or ex partner: None     Emotionally abused: None     Physically abused: None     Forced sexual activity: None   Other Topics Concern    None   Social History Narrative    None       REVIEW OFSYSTEMS    10systems reviewed, pertinent positives per HPI otherwise noted to be negative. PHYSICAL EXAM  Physical Exam  Vitals:    09/11/20 1411   BP: 104/61   Pulse: 109   Resp: 19   Temp: 100.2 °F (37.9 °C)   SpO2: 98%       GENERAL: Patient is well-developed, morbidly obese. Awake andalert. Cooperative. Resting in bed. No apparent distress. HEENT:  Normocephalic, atraumatic. Conjunctivaappear normal. Sclera is non-icteric. External ears are normal.    NECK: Supple with normal ROM. Tracheamidline  LUNGS: Equal and symmetric chest rise. Breathing is unlabored. Speaking comfortably in fullsentences. Lungs are clear bilaterally to auscultation. Without wheezing, rales, or rhonchi. CADIOVASCULAR:  Regular rate and rhythm. Normal S1-S2 sounds. No murmurs, rubs, or gallops. GI: Soft, mild generalized tenderness, pain with RLQ palpation, distended with hypoactive bowel sounds. No rebound tenderness, guarding or rigidity. Negative Antunez's sign. Negative Rovsing's sign. No CVAT to palpation. No masses or hepatosplenomegaly to palpation. MUSCULOSKELETAL:  No gross deformities or trauma noted. Moving all extremities equally and appropriately. Normal ROM. SKIN: Warm/dry. No rashes or lesions noted. Left lower leg with Jobst stocking in place. PSYCHIATRIC: Mood and affect appropriate. Speech is clear and articulate. NEUROLOGIC: Alert and oriented.  No focal motor or sensory deficits.      LABS   Results for orders placed or performed during the hospital encounter of 09/11/20   CBC Auto Differential   Result Value Ref Range    WBC 11.9 (H) 4.0 - 11.0 K/uL    RBC 4.13 (L) 4.20 - 5.90 M/uL    Hemoglobin 12.4 (L) 13.5 - 17.5 g/dL    Hematocrit 37.9 (L) 40.5 - 52.5 %    MCV 91.9 80.0 - 100.0 fL    MCH 30.0 26.0 - 34.0 pg    MCHC 32.7 31.0 - 36.0 g/dL    RDW 15.0 12.4 - 15.4 %    Platelets 103 780 - 419 K/uL    MPV 9.2 5.0 - 10.5 fL    Neutrophils % 75.7 %    Lymphocytes % 13.9 %    Monocytes % 9.2 %    Eosinophils % 0.5 %    Basophils % 0.7 %    Neutrophils Absolute 9.0 (H) 1.7 - 7.7 K/uL    Lymphocytes Absolute 1.6 1.0 - 5.1 K/uL    Monocytes Absolute 1.1 0.0 - 1.3 K/uL    Eosinophils Absolute 0.1 0.0 - 0.6 K/uL    Basophils Absolute 0.1 0.0 - 0.2 K/uL   Comprehensive metabolic panel   Result Value Ref Range    Sodium 138 136 - 145 mmol/L    Potassium 4.8 3.5 - 5.1 mmol/L    Chloride 98 (L) 99 - 110 mmol/L    CO2 27 21 - 32 mmol/L    Anion Gap 13 3 - 16    Glucose 133 (H) 70 - 99 mg/dL    BUN 16 7 - 20 mg/dL    CREATININE 0.9 0.8 - 1.3 mg/dL    GFR Non-African American >60 >60    GFR African American >60 >60    Calcium 9.7 8.3 - 10.6 mg/dL    Total Protein 7.6 6.4 - 8.2 g/dL    Alb 4.1 3.4 - 5.0 g/dL    Albumin/Globulin Ratio 1.2 1.1 - 2.2    Total Bilirubin 1.2 (H) 0.0 - 1.0 mg/dL    Alkaline Phosphatase 75 40 - 129 U/L    ALT 29 10 - 40 U/L    AST 31 15 - 37 U/L    Globulin 3.5 g/dL   Lipase   Result Value Ref Range    Lipase 31.0 13.0 - 60.0 U/L   Urinalysis Reflex to Culture    Specimen: Urine, clean catch   Result Value Ref Range    Color, UA Yellow Straw/Yellow    Clarity, UA Clear Clear    Glucose, Ur Negative Negative mg/dL    Bilirubin Urine Negative Negative    Ketones, Urine Negative Negative mg/dL    Specific Gravity, UA 1.015 1.005 - 1.030    Blood, Urine Negative Negative    pH, UA 7.0 5.0 - 8.0    Protein, UA Negative Negative mg/dL    Urobilinogen, Urine 1.0 <2.0 E.U./dL    Nitrite, Urine Negative Negative    Leukocyte Esterase, Urine Negative Negative    Microscopic Examination Not Indicated     Urine Type NotGiven     Urine Reflex to Culture Not Indicated    Sample possible blood bank testing   Result Value Ref Range    Specimen Status MARCO ANTONIO    Protime-INR   Result Value Ref Range    Protime 26.1 (H) 10.0 - 13.2 sec    INR 2.23 (H) 0.86 - 1.14   EKG 12 Lead   Result Value Ref Range    Ventricular Rate 103 BPM    Atrial Rate 45 BPM    QRS Duration 102 ms    Q-T Interval 332 ms    QTc Calculation (Bazett) 434 ms    R Axis 72 degrees    T Axis -56 degrees    Diagnosis       Atrial fibrillation with rapid ventricular response with premature ventricular or aberrantly conducted complexesLow voltage QRSST & T wave abnormality, consider lateral ischemia or digitalis effectAbnormal ECGWhen compared with ECG of 05-FEB-2020 10:19,No significant change was found       RADIOLOGY    Ct Abdomen Pelvis W Iv Contrast Additional Contrast? None    Result Date: 9/11/2020  EXAMINATION: CT OF THE ABDOMEN AND PELVIS WITH CONTRAST 9/11/2020 11:14 am TECHNIQUE: CT of the abdomen and pelvis was performed with the administration of intravenous contrast. Multiplanar reformatted images are provided for review. Dose modulation, iterative reconstruction, and/or weight based adjustment of the mA/kV was utilized to reduce the radiation dose to as low as reasonably achievable. COMPARISON: None. HISTORY: ORDERING SYSTEM PROVIDED HISTORY: abdominal pain, h/o bowel obstruc TECHNOLOGIST PROVIDED HISTORY: Reason for exam:->abdominal pain, h/o bowel obstruc Additional Contrast?->None Reason for Exam: RLQ pain for the last couple of days; h/o bowel obstruction Acuity: Acute Type of Exam: Initial FINDINGS: Lower Chest: Linear/bandlike densities at the lung bases may represent atelectasis and/or scar. There is streak artifact associated with AICD lead.  There is suggestion of herniation of mesenteric fat into the posterior mediastinum. There is a nonspecific ovoid hypodense lesion right lateral to the distal esophagus (image 22). Finding measures 3.6 x 1.5 cm. Exact etiology of the finding is indeterminate. Organs: There is low attenuation about the liver suggestive of changes of fatty infiltration. Gallbladder appears mildly distended. No focal abnormality of the spleen is identified. Small splenule is visualized (image 51). There is atrophic change of the pancreas. Adrenal glands are unremarkable. There is mild lobulation of the contour of both kidneys with minimal perinephric stranding. GI/Bowel: The appendix is abnormal in appearance measuring up to 1.4 cm in diameter. There is injection of adjacent mesenteric fat. Combination of findings is suspicious for representing changes of appendicitis. No focal fluid collection to suggest presence of abscess. No evidence of intraperitoneal free air. Subcentimeter nodular density near adjacent mesenteric fat could represent minimally prominent lymph node (image 155) which may be reactive. Small bowel normal in caliber with no findings to suggest presence of obstruction. There is redundancy of the sigmoid such that portion the sigmoid extends into the anterior aspect of the lower abdomen. There are diverticula of the colon with no findings to suggest changes of diverticulitis. Pelvis: Foci of calcification identified within the prostate gland. No focal abnormality of the urinary bladder is identified. No evidence of free fluid within the pelvis. Peritoneum/Retroperitoneum: No significant abdominal/pelvic lymphadenopathy is identified. Minimal atherosclerotic calcification of the abdominal aorta with no evidence of aneurysm. There is minimal ectasia of the common iliac arteries. Bones/Soft Tissues: There are multilevel degenerative changes of the spine.  Flowing calcification along several lower thoracic vertebral bodies raises the possibility of changes of DISH.  Degenerative change in the lumbar region results in multilevel narrowing of the central spinal canal.  There are degenerative changes of the sacroiliac joints bilaterally. Small umbilical hernia containing fat is identified. There is minimal injection of the fat within the hernia. There is laxity of the inguinal canals bilaterally which are distended by fat. 1. Findings most consistent with changes related to appendicitis. 2. No evidence of bowel obstruction, intraperitoneal free air, or abscess. 3. Findings suggestive of presence of fatty infiltration of the liver. ED COURSE/MDM  Patient seen and evaluated. Old records reviewed. Diagnostic testing reviewed and results discussed. I have seen and evaluated this patient with supervising physician: Ruthie Langley MD. We thoroughly discussed the history, physical exam, diagnostic testing, emergency department course, plan and disposition. Karyna Graham presented to the ED today with above noted complaints. Physical exam does reveal mild generalized abdominal tenderness with pain to palpation of the right lower quadrant. Blood work shows a leukocytosis is WBC are elevated at 11.9, absolute neutrophils elevated at 9. No further differential shift. Stable anemia. No significant electrolyte abnormality. No evidence of acute kidney injury or transaminitis. Lipase is normal.  PT/INR obtained and elevated at 26.1/2. 23. This is expected given that the patient is on Coumadin. Urinalysis is without evidence of infection. No blood to suggest kidney stone.     Pt was given the following medications or treatments in the ED:   Medications   morphine (PF) injection 4 mg (4 mg Intravenous Not Given 9/11/20 1148)   ondansetron (ZOFRAN) injection 4 mg (4 mg Intravenous Given 9/11/20 1144)   lactated ringers infusion 1,000 mL (1,000 mLs Intravenous New Bag 9/11/20 1401)   0.9 % sodium chloride bolus (0 mLs Intravenous Stopped 9/11/20 1253) iopamidol (ISOVUE-370) 76 % injection 75 mL (75 mLs Intravenous Given 9/11/20 1129)   piperacillin-tazobactam (ZOSYN) 3.375 g in dextrose 5 % 50 mL IVPB (mini-bag) (3.375 g Intravenous New Bag 9/11/20 1358)     CT of the abdomen and pelvis obtained and shows findings consistent with appendicitis. No evidence of bowel obstruction, intraperitoneal free air or abscess. There are also findings suggestive of presence of fatty liver. Patient was given IV fluid bolus here in the ED as well as started on Zosyn for the appendicitis. He is also been given medications for pain and nausea control. I consulted and spoke with general surgery, Dr. Shamika Rosa, he is aware of the patient's diagnostic studies and that he is on Coumadin for A. fib, I did specifically discuss the INR. He stated that patient would likely be taken to surgery later today. As I have deemed necessary from their history, physical and studies, I have considered and evaluated Chai Sarmiento for the following diagnoses:  ACUTE APPENDICITIS, BOWEL OBSTRUCTION, CHOLECYSTITIS, DIVERTICULITIS, INCARCERATED HERNIA, PANCREATITIS, or PERFORATED BOWEL or ULCER. CLINICAL IMPRESSION    1. Acute appendicitis with localized peritonitis, without perforation, abscess, or gangrene    2. Right lower quadrant abdominal pain    3. Chronic atrial fibrillation    4. Chronic anticoagulation       DISPOSITION  Will be taken for emergent surgery later today. Comment: Ple[employer] this report has been produced using speech recognition software and may contain errors related to that system including errors in grammar, punctuation, and spelling, as well as words and phrases that may beinappropriate. If there are any questions or concerns please feel free to contact the dictating provider for clarification.         Jc Rodriguez, AIDA - CNP  09/11/20 0787

## 2020-09-11 NOTE — ED NOTES
Bed: 02  Expected date: 9/11/20  Expected time: 10:15 AM  Means of arrival: [place]  Comments:  [id number] [facility] [address] [facility]  09/11/20 1020

## 2020-09-11 NOTE — H&P
Department of General Surgery - Adult   History and Physical      PATIENT NAME: Horacio Harris   YOB: 1955    ADMISSION DATE: 9/11/2020 10:20 AM      TODAY'S DATE: 9/11/2020    CHIEF COMPLAINT:  RLQ pain      HISTORY OF PRESENT ILLNESS:  The patient is a 60 y.o. male  who presents with RLQ pain. Started several days ago and has been sharp and constant. Some nausea and fevers. No diarrhea or constipation. Past Medical History:        Diagnosis Date    Asthma     Atrial fibrillation ([address]     Diabetes mellitus ([address]     Hyperlipidemia     Hypertension     Thyroid disease        Past Surgical History:        Procedure Laterality Date    ABLATION OF DYSRHYTHMIC FOCUS  2009 AND 2009    TIMES TWO    CARDIAC CATHETERIZATION Left 2015    CARDIAC DEFIBRILLATOR PLACEMENT  2016       Medications Prior to Admission:   Prior to Admission medications    Medication Sig Start Date End Date Taking? Authorizing Provider   ALLOPURINOL PO Take 100 mg by mouth daily    Yes Historical Provider, MD   testosterone (ANDROGEL; TESTIM) 50 MG/5GM (1%) GEL 1% gel Apply topically daily. Yes Historical Provider, MD   spironolactone (ALDACTONE) 25 MG tablet Take 25 mg by mouth daily   Yes Historical Provider, MD   atorvastatin (LIPITOR) 10 MG tablet Take 10 mg by mouth daily    Yes Historical Provider, MD   clonazePAM (KLONOPIN) 1 MG tablet Take one-half tablet by mouth twice a day as needed for anxiety   Yes Historical Provider, MD   digoxin (LANOXIN) 250 MCG tablet Take 125 mcg by mouth daily  8/21/17  Yes Historical Provider, MD   gabapentin (NEURONTIN) 300 MG capsule Take 300 mg by mouth 3 times daily.     Yes Historical Provider, MD   Levothyroxine Sodium 112 MCG CAPS Take 112 mcg by mouth daily    Yes Historical Provider, MD   lisinopril (PRINIVIL;ZESTRIL) 40 MG tablet Take 30 mg by mouth daily  3/8/17  Yes Historical Provider, MD   loratadine (CLARITIN) 10 MG tablet Take 10 mg by mouth daily    Yes Historical Provider, MD   metoprolol succinate (TOPROL XL) 50 MG extended release tablet Take 200 mg by mouth daily  8/28/17  Yes Historical Provider, MD   montelukast (SINGULAIR) 10 MG tablet Take 10 mg by mouth nightly    Yes Historical Provider, MD   torsemide (DEMADEX) 20 MG tablet Take 20 mg by mouth daily  4/13/16  Yes Historical Provider, MD   vardenafil (LEVITRA) 20 MG tablet Take 1/2 tablet by mouth one hour before sexual activity   Yes Historical Provider, MD   warfarin (COUMADIN) 5 MG tablet 5 mg Indications: 2 5mg tablet every day but wednesday and on wednesday take 12.5 mg    Yes Historical Provider, MD   azelastine (ASTELIN) 0.1 % nasal spray 1 spray by Nasal route 2 times daily Use in each nostril as directed   Yes Historical Provider, MD       Allergies:  Pravastatin    Social History:   TOBACCO:  no  ETOH:   no    Family History:       Problem Relation Age of Onset    Cancer Mother     Cancer Father        REVIEW OF SYSTEMS:    CONSTITUTIONAL:  positive for  fevers  HEENT:  Negative  RESPIRATORY:  negative  CARDIOVASCULAR:  negative  GASTROINTESTINAL:  positive for nausea and abdominal pain  GENITOURINARY:  negative  HEMATOLOGIC/LYMPHATIC:  negative  ENDOCRINE:  Negative  NEUROLOGICAL:  Negative  * All other ROS reviewed and negative. PHYSICAL EXAM:    VITALS:  /61   Pulse 109   Temp 100.2 °F (37.9 °C) (Oral)   Resp 19   Ht 6' 5\" (1.956 m)   Wt (!) 420 lb (190.5 kg)   SpO2 98%   BMI 49.80 kg/m²   INTAKE/OUTPUT:   I/O last 3 completed shifts: In: 1050 [IV Piggyback:1050]  Out: 250 [Urine:250]  No intake/output data recorded.   CONSTITUTIONAL:  awake, alert, no apparent distress and morbidly obese  ENT:  normocephalic, without obvious abnormality  NECK:  supple, symmetrical, trachea midline   LUNGS:  no crackles or wheezing  CARDIOVASCULAR:  tachycardic  ABDOMEN:  , normal bowel sounds, soft, non-distended, tenderness noted in the right lower quadrant, voluntary guarding absent, no masses palpated and hernia present umbilical  MUSCULOSKELETAL:  0+ pitting edema lower extremities  NEUROLOGIC:  Mental Status Exam:  Level of Alertness:   awake  Orientation:   person, place, time  SKIN:  no rashes      DATA:  CBC:   Recent Labs     09/11/20  1031   WBC 11.9*   HGB 12.4*   HCT 37.9*        BMP:    Recent Labs     09/11/20  1031      K 4.8   CL 98*   CO2 27   BUN 16   CREATININE 0.9   GLUCOSE 133*     Hepatic:   Recent Labs     09/11/20  1031   AST 31   ALT 29   BILITOT 1.2*   ALKPHOS 75     Mag:    No results for input(s): MG in the last 72 hours. Phos:   No results for input(s): PHOS in the last 72 hours. INR:   Recent Labs     09/11/20  1031   INR 2.23*       Radiology Review: Images personally reviewed by me. CT -   1. Findings most consistent with changes related to appendicitis. 2. No evidence of bowel obstruction, intraperitoneal free air, or abscess. 3. Findings suggestive of presence of fatty infiltration of the liver.                 ASSESSMENT AND PLAN:  59 yo with acute appendicitis  1. Discussed his diagnosis and indications for appendectomy. Risks of operation including bleeding, infection and open procedure discussed. Discussed his increased risks of bleeding in relation to history of anticoagulant usage. 2.  IV abx given  3. Plan OR today      PRACTITIONER CERTIFICATION   I certify that Sarah Marks is expected to be hospitalized for >2 days based on the above assessment and plan.       Electronically signed by Noe Boss, [address] [place] [address]  [id number]

## 2020-09-11 NOTE — ED NOTES

## 2020-09-11 NOTE — OP NOTE
Date of Surgery: 9/11/20    Preop Dx:  Acute Appendicitis    Postop Dx:  Perforated Appendicitis    Procedure:  Laparoscopic appendectomy    Surgeon:  Dennys Johnston    Assistant:      Anesthesia:  GETA    EBL:   <50ml    Specimen:  appendix    Complications: none    Drains/Lines:  15f channel drain    Indications:  59 yo with acute appendicitis    Description:  Patient was given adequate description of the risks and rewards of the procedure, including bleeding, infection, possible injury to surrounding structures, and possibility of open procedure and freely consented. Patient was given appropriate antibiotics and brought to the OR where GETA anesthesia was induced. He was placed in supine position. Prepped and draped in usual sterile fashion. Local anesthetic injected in supraumbilical region and incision made in epidermis allowing for insertion of 5mm optiview trocar. Once it was inserted the abdomen was insufflated with CO2 to 15mmHg pressure. The 30 degree laparoscope was then inserted and peritoneal cavity was inspected. Next, local anesthetic was injected in the left mid abdomen and under direct visualization a 12mm trocar was inserted. To facilitate the final trocar placement (so it could be seen) the omentum in the umbilical hernia was reduced. The patient then had insertion of a 5mm trocar in the lower midline. Another 5mm trocar was inserted in the upper abdomen to assist in exposure. The appendic was mobilized and found to be perforated in the mid appendix. A window was created in the base of the mesoappendix. Then, using a blue load on the linear staple, the base of the appendix was stapled across without injury noticed to any surrounding structures. The mesoappendix was controlled with sonacision device. The appendix was placed in an endoretrieval bag, removed from the abdomen and sent as specimen.   This was done in two pieces as it fractured at its location of perforation during dissection. The area was then copiously irrigated with all fluid being suctioned off. Hemostasis was assured and staple lines were intact. Through the upper abdomen trocar site a 15F channel drain was inserted and positioned at the base of the cecum. It was secured to the skin with 2-0 silk suture. Under direct visualization the 12mm trocar and 5mm trocar were removed without bleeding seen at their insertion sites. The abdomen was allowed to desufflate and the final trocar was removed. The 12mm trocar site had the fascia closed with 0-0 vicryl figure of eight suture. Then, all trocar sites had the epidermis reapproximated with 4-0 monocryl subcuticular suture. Sterile dressing placed. All suture, sponge and instrument count correct times two at end of case. Transferred to PACU in stable condition.     Aguilar Gan MD

## 2020-09-12 LAB
ANION GAP SERPL CALCULATED.3IONS-SCNC: 12 MMOL/L (ref 3–16)
BASOPHILS ABSOLUTE: 0.1 K/UL (ref 0–0.2)
BASOPHILS RELATIVE PERCENT: 0.6 %
BUN BLDV-MCNC: 22 MG/DL (ref 7–20)
CALCIUM SERPL-MCNC: 9.3 MG/DL (ref 8.3–10.6)
CHLORIDE BLD-SCNC: 100 MMOL/L (ref 99–110)
CO2: 26 MMOL/L (ref 21–32)
CREAT SERPL-MCNC: 1 MG/DL (ref 0.8–1.3)
EOSINOPHILS ABSOLUTE: 0 K/UL (ref 0–0.6)
EOSINOPHILS RELATIVE PERCENT: 0 %
GFR AFRICAN AMERICAN: >60
GFR NON-AFRICAN AMERICAN: >60
GLUCOSE BLD-MCNC: 153 MG/DL (ref 70–99)
GLUCOSE BLD-MCNC: 154 MG/DL (ref 70–99)
HCT VFR BLD CALC: 34.5 % (ref 40.5–52.5)
HEMOGLOBIN: 11.2 G/DL (ref 13.5–17.5)
INR BLD: 2.07 (ref 0.86–1.14)
LYMPHOCYTES ABSOLUTE: 1.1 K/UL (ref 1–5.1)
LYMPHOCYTES RELATIVE PERCENT: 11.4 %
MCH RBC QN AUTO: 30.1 PG (ref 26–34)
MCHC RBC AUTO-ENTMCNC: 32.4 G/DL (ref 31–36)
MCV RBC AUTO: 92.8 FL (ref 80–100)
MONOCYTES ABSOLUTE: 0.8 K/UL (ref 0–1.3)
MONOCYTES RELATIVE PERCENT: 8.6 %
NEUTROPHILS ABSOLUTE: 7.5 K/UL (ref 1.7–7.7)
NEUTROPHILS RELATIVE PERCENT: 79.4 %
PDW BLD-RTO: 15 % (ref 12.4–15.4)
PERFORMED ON: ABNORMAL
PLATELET # BLD: 130 K/UL (ref 135–450)
PMV BLD AUTO: 9.8 FL (ref 5–10.5)
POTASSIUM SERPL-SCNC: 4.8 MMOL/L (ref 3.5–5.1)
PROTHROMBIN TIME: 24.2 SEC (ref 10–13.2)
RBC # BLD: 3.72 M/UL (ref 4.2–5.9)
SODIUM BLD-SCNC: 138 MMOL/L (ref 136–145)
WBC # BLD: 9.5 K/UL (ref 4–11)

## 2020-09-12 PROCEDURE — 99024 POSTOP FOLLOW-UP VISIT: CPT | Performed by: SURGERY

## 2020-09-12 PROCEDURE — 6360000002 HC RX W HCPCS: Performed by: SURGERY

## 2020-09-12 PROCEDURE — 85025 COMPLETE CBC W/AUTO DIFF WBC: CPT

## 2020-09-12 PROCEDURE — 85610 PROTHROMBIN TIME: CPT

## 2020-09-12 PROCEDURE — 36415 COLL VENOUS BLD VENIPUNCTURE: CPT

## 2020-09-12 PROCEDURE — 6370000000 HC RX 637 (ALT 250 FOR IP): Performed by: SURGERY

## 2020-09-12 PROCEDURE — 2500000003 HC RX 250 WO HCPCS: Performed by: SURGERY

## 2020-09-12 PROCEDURE — 80048 BASIC METABOLIC PNL TOTAL CA: CPT

## 2020-09-12 PROCEDURE — 1200000000 HC SEMI PRIVATE

## 2020-09-12 PROCEDURE — 2580000003 HC RX 258: Performed by: SURGERY

## 2020-09-12 RX ORDER — HYDROCODONE BITARTRATE AND ACETAMINOPHEN 5; 325 MG/1; MG/1
2 TABLET ORAL EVERY 4 HOURS PRN
Status: DISCONTINUED | OUTPATIENT
Start: 2020-09-12 | End: 2020-09-25 | Stop reason: HOSPADM

## 2020-09-12 RX ORDER — WARFARIN SODIUM 5 MG/1
10 TABLET ORAL DAILY
Status: DISCONTINUED | OUTPATIENT
Start: 2020-09-12 | End: 2020-09-15 | Stop reason: DRUGHIGH

## 2020-09-12 RX ORDER — HYDROCODONE BITARTRATE AND ACETAMINOPHEN 5; 325 MG/1; MG/1
1 TABLET ORAL EVERY 4 HOURS PRN
Status: DISCONTINUED | OUTPATIENT
Start: 2020-09-12 | End: 2020-09-25 | Stop reason: HOSPADM

## 2020-09-12 RX ADMIN — DIGOXIN 125 MCG: 125 TABLET ORAL at 10:02

## 2020-09-12 RX ADMIN — MEROPENEM 1 G: 1 INJECTION, POWDER, FOR SOLUTION INTRAVENOUS at 03:42

## 2020-09-12 RX ADMIN — SPIRONOLACTONE 25 MG: 25 TABLET ORAL at 09:56

## 2020-09-12 RX ADMIN — GABAPENTIN 300 MG: 300 CAPSULE ORAL at 20:15

## 2020-09-12 RX ADMIN — TORSEMIDE 20 MG: 20 TABLET ORAL at 09:56

## 2020-09-12 RX ADMIN — MONTELUKAST SODIUM 10 MG: 10 TABLET, FILM COATED ORAL at 20:15

## 2020-09-12 RX ADMIN — METOPROLOL SUCCINATE 200 MG: 50 TABLET, EXTENDED RELEASE ORAL at 09:56

## 2020-09-12 RX ADMIN — MEROPENEM 1 G: 1 INJECTION, POWDER, FOR SOLUTION INTRAVENOUS at 20:15

## 2020-09-12 RX ADMIN — LEVOTHYROXINE SODIUM 112 MCG: 0.11 TABLET ORAL at 09:56

## 2020-09-12 RX ADMIN — WARFARIN SODIUM 10 MG: 5 TABLET ORAL at 18:00

## 2020-09-12 RX ADMIN — MEROPENEM 1 G: 1 INJECTION, POWDER, FOR SOLUTION INTRAVENOUS at 12:41

## 2020-09-12 RX ADMIN — LISINOPRIL 30 MG: 20 TABLET ORAL at 20:15

## 2020-09-12 RX ADMIN — FAMOTIDINE 20 MG: 10 INJECTION INTRAVENOUS at 20:15

## 2020-09-12 RX ADMIN — SODIUM CHLORIDE: 9 INJECTION, SOLUTION INTRAVENOUS at 20:15

## 2020-09-12 RX ADMIN — ALLOPURINOL 100 MG: 100 TABLET ORAL at 09:56

## 2020-09-12 RX ADMIN — ATORVASTATIN CALCIUM 10 MG: 10 TABLET, FILM COATED ORAL at 20:15

## 2020-09-12 RX ADMIN — FAMOTIDINE 20 MG: 10 INJECTION INTRAVENOUS at 09:57

## 2020-09-12 RX ADMIN — HYDROCODONE BITARTRATE AND ACETAMINOPHEN 2 TABLET: 5; 325 TABLET ORAL at 18:22

## 2020-09-12 ASSESSMENT — PAIN SCALES - GENERAL: PAINLEVEL_OUTOF10: 7

## 2020-09-12 NOTE — PLAN OF CARE
4 Eyes Skin Assessment     The patient is being assess for  Admission    I agree that 2 RN's have performed a thorough Head to Toe Skin Assessment on the patient. ALL assessment sites listed below have been assessed. Areas assessed by both nurses:   [x]   Head, Face, and Ears   [x]   Shoulders, Back, and Chest  [x]   Arms, Elbows, and Hands   [x]   Coccyx, Sacrum, and IschIum  [x]   Legs, Feet, and Heels        Does the Patient have Skin Breakdown?   No         Kaleb Prevention initiated:  Yes   Wound Care Orders initiated:  No      [facility] nurse consulted for Pressure Injury (Stage 3,4, Unstageable, DTI, NWPT, and Complex wounds), New and Established Ostomies:  Yes      Nurse 1 eSignature: Electronically signed by Soraida Park RN on 9/11/20 at 10:32 PM EDT    **SHARE this note so that the co-signing nurse is able to place an eSignature**    Nurse 2 eSignature: Electronically signed by Aliyah Valenzuela RN on 9/12/20 at 12:33 AM EDT

## 2020-09-12 NOTE — CARE COORDINATION
Writer attempted to call into patient room for initial assessment. No answer. Chart review completed. Pt underwent an appendectomy on 9/11/20. Appears independent prior to admission. Alfonzo Brandt listed as primary care physician. Pt insurance [employer] primary and [employer] secondary. No needs noted. Please advise should any needs arise.  Александр Dinh RN

## 2020-09-12 NOTE — PROGRESS NOTES
Shift assessment updated as charted. Patient a/ox4. VSS. Patient denies pain at this time. Tolerating clear liquid diet. Passing flatus. Will reassess.

## 2020-09-12 NOTE — PROGRESS NOTES
Pt admitted to c3 from OR. Oriented to room, call light and POC. Incisions C/D/I. DESTIN with sanguineous drainage. Call light within reach, will continue to monitor.

## 2020-09-12 NOTE — PROGRESS NOTES
[facility] GENERAL SURGERY    Surgery Progress Note           POD # 1    PATIENT NAME: Luz Marina Watson     TODAY'S DATE: 9/12/2020    INTERVAL HISTORY:    Pt states pain controlled, denies nausea. OBJECTIVE:   VITALS:  /71   Pulse 85   Temp 97.6 °F (36.4 °C) (Oral)   Resp 20   Ht 6' 5\" (1.956 m)   Wt (!) 434 lb 1.4 oz (196.9 kg)   SpO2 98%   BMI 51.48 kg/m²     INTAKE/OUTPUT:    I/O last 3 completed shifts: In: 2652 [I.V.:1602; IV Piggyback:1050]  Out: 960 [Urine:900; Drains:60]  I/O this shift:  In: 3460 [P.O.:1320; I.V.:493]  Out: 1150 [Urine:1150]              CONSTITUTIONAL:  awake and alert  LUNGS:  no crackles or wheezing  ABDOMEN:   normal bowel sounds, soft, non-distended, tender, gisel serosanguinous   INCISION: no drainage    Data:  CBC:   Recent Labs     09/11/20  1031 09/12/20  0628   WBC 11.9* 9.5   HGB 12.4* 11.2*   HCT 37.9* 34.5*    130*     BMP:    Recent Labs     09/11/20  1031 09/12/20  0628    138   K 4.8 4.8   CL 98* 100   CO2 27 26   BUN 16 22*   CREATININE 0.9 1.0   GLUCOSE 133* 154*     Hepatic:   Recent Labs     09/11/20  1031   AST 31   ALT 29   BILITOT 1.2*   ALKPHOS 75     Mag:    No results for input(s): MG in the last 72 hours. Phos:   No results for input(s): PHOS in the last 72 hours. INR:   Recent Labs     09/11/20  1031 09/12/20  0628   INR 2.23* 2.07*         Radiology Review:  NA    ASSESSMENT AND PLAN:  60 y.o. male status post lap appy for perforated appendicitis  1. Full liquid diet  2.  PO pain control  3. Continue IV abx  4.   Restart coumadin         Electronically signed by Jon Sarmiento MD

## 2020-09-13 ENCOUNTER — APPOINTMENT (OUTPATIENT)
Dept: GENERAL RADIOLOGY | Age: 65
DRG: 338 | End: 2020-09-13
Payer: MEDICARE

## 2020-09-13 LAB
ANION GAP SERPL CALCULATED.3IONS-SCNC: 11 MMOL/L (ref 3–16)
BASOPHILS ABSOLUTE: 0 K/UL (ref 0–0.2)
BASOPHILS RELATIVE PERCENT: 0.5 %
BUN BLDV-MCNC: 20 MG/DL (ref 7–20)
CALCIUM SERPL-MCNC: 9.5 MG/DL (ref 8.3–10.6)
CHLORIDE BLD-SCNC: 101 MMOL/L (ref 99–110)
CO2: 27 MMOL/L (ref 21–32)
CREAT SERPL-MCNC: 0.8 MG/DL (ref 0.8–1.3)
EOSINOPHILS ABSOLUTE: 0.1 K/UL (ref 0–0.6)
EOSINOPHILS RELATIVE PERCENT: 1 %
GFR AFRICAN AMERICAN: >60
GFR NON-AFRICAN AMERICAN: >60
GLUCOSE BLD-MCNC: 111 MG/DL (ref 70–99)
GLUCOSE BLD-MCNC: 117 MG/DL (ref 70–99)
HCT VFR BLD CALC: 38.8 % (ref 40.5–52.5)
HEMOGLOBIN: 12.7 G/DL (ref 13.5–17.5)
INR BLD: 1.83 (ref 0.86–1.14)
LYMPHOCYTES ABSOLUTE: 1.6 K/UL (ref 1–5.1)
LYMPHOCYTES RELATIVE PERCENT: 18 %
MCH RBC QN AUTO: 30.4 PG (ref 26–34)
MCHC RBC AUTO-ENTMCNC: 32.9 G/DL (ref 31–36)
MCV RBC AUTO: 92.5 FL (ref 80–100)
MONOCYTES ABSOLUTE: 0.8 K/UL (ref 0–1.3)
MONOCYTES RELATIVE PERCENT: 8.6 %
NEUTROPHILS ABSOLUTE: 6.5 K/UL (ref 1.7–7.7)
NEUTROPHILS RELATIVE PERCENT: 71.9 %
PDW BLD-RTO: 15 % (ref 12.4–15.4)
PERFORMED ON: ABNORMAL
PLATELET # BLD: 177 K/UL (ref 135–450)
PMV BLD AUTO: 10 FL (ref 5–10.5)
POTASSIUM SERPL-SCNC: 4.4 MMOL/L (ref 3.5–5.1)
PROTHROMBIN TIME: 21.4 SEC (ref 10–13.2)
RBC # BLD: 4.19 M/UL (ref 4.2–5.9)
SODIUM BLD-SCNC: 139 MMOL/L (ref 136–145)
TROPONIN: <0.01 NG/ML
WBC # BLD: 9 K/UL (ref 4–11)

## 2020-09-13 PROCEDURE — 74018 RADEX ABDOMEN 1 VIEW: CPT

## 2020-09-13 PROCEDURE — 36415 COLL VENOUS BLD VENIPUNCTURE: CPT

## 2020-09-13 PROCEDURE — 74022 RADEX COMPL AQT ABD SERIES: CPT

## 2020-09-13 PROCEDURE — 71045 X-RAY EXAM CHEST 1 VIEW: CPT

## 2020-09-13 PROCEDURE — 6360000002 HC RX W HCPCS: Performed by: SURGERY

## 2020-09-13 PROCEDURE — 93005 ELECTROCARDIOGRAM TRACING: CPT | Performed by: SURGERY

## 2020-09-13 PROCEDURE — 84484 ASSAY OF TROPONIN QUANT: CPT

## 2020-09-13 PROCEDURE — 99024 POSTOP FOLLOW-UP VISIT: CPT | Performed by: SURGERY

## 2020-09-13 PROCEDURE — 85025 COMPLETE CBC W/AUTO DIFF WBC: CPT

## 2020-09-13 PROCEDURE — 85610 PROTHROMBIN TIME: CPT

## 2020-09-13 PROCEDURE — 80048 BASIC METABOLIC PNL TOTAL CA: CPT

## 2020-09-13 PROCEDURE — 2500000003 HC RX 250 WO HCPCS: Performed by: SURGERY

## 2020-09-13 PROCEDURE — 6370000000 HC RX 637 (ALT 250 FOR IP): Performed by: SURGERY

## 2020-09-13 PROCEDURE — 1200000000 HC SEMI PRIVATE

## 2020-09-13 PROCEDURE — 2580000003 HC RX 258: Performed by: SURGERY

## 2020-09-13 RX ORDER — DOCUSATE SODIUM 100 MG/1
100 CAPSULE, LIQUID FILLED ORAL DAILY
Status: DISCONTINUED | OUTPATIENT
Start: 2020-09-13 | End: 2020-09-23

## 2020-09-13 RX ORDER — DEXTROSE MONOHYDRATE 50 MG/ML
100 INJECTION, SOLUTION INTRAVENOUS PRN
Status: DISCONTINUED | OUTPATIENT
Start: 2020-09-13 | End: 2020-09-25 | Stop reason: HOSPADM

## 2020-09-13 RX ORDER — PROCHLORPERAZINE EDISYLATE 5 MG/ML
10 INJECTION INTRAMUSCULAR; INTRAVENOUS EVERY 6 HOURS PRN
Status: DISCONTINUED | OUTPATIENT
Start: 2020-09-13 | End: 2020-09-25 | Stop reason: HOSPADM

## 2020-09-13 RX ORDER — NICOTINE POLACRILEX 4 MG
15 LOZENGE BUCCAL PRN
Status: DISCONTINUED | OUTPATIENT
Start: 2020-09-13 | End: 2020-09-19 | Stop reason: SDUPTHER

## 2020-09-13 RX ORDER — NALOXONE HYDROCHLORIDE 0.4 MG/ML
0.4 INJECTION, SOLUTION INTRAMUSCULAR; INTRAVENOUS; SUBCUTANEOUS PRN
Status: DISCONTINUED | OUTPATIENT
Start: 2020-09-13 | End: 2020-09-25 | Stop reason: HOSPADM

## 2020-09-13 RX ORDER — DEXTROSE MONOHYDRATE 25 G/50ML
12.5 INJECTION, SOLUTION INTRAVENOUS PRN
Status: DISCONTINUED | OUTPATIENT
Start: 2020-09-13 | End: 2020-09-19 | Stop reason: SDUPTHER

## 2020-09-13 RX ORDER — NALOXONE HYDROCHLORIDE 0.4 MG/ML
INJECTION, SOLUTION INTRAMUSCULAR; INTRAVENOUS; SUBCUTANEOUS
Status: DISPENSED
Start: 2020-09-13 | End: 2020-09-14

## 2020-09-13 RX ADMIN — LEVOTHYROXINE SODIUM 112 MCG: 0.11 TABLET ORAL at 08:47

## 2020-09-13 RX ADMIN — SPIRONOLACTONE 25 MG: 25 TABLET ORAL at 08:47

## 2020-09-13 RX ADMIN — CETIRIZINE HYDROCHLORIDE 10 MG: 10 TABLET, FILM COATED ORAL at 08:47

## 2020-09-13 RX ADMIN — FAMOTIDINE 20 MG: 10 INJECTION INTRAVENOUS at 08:47

## 2020-09-13 RX ADMIN — MEROPENEM 1 G: 1 INJECTION, POWDER, FOR SOLUTION INTRAVENOUS at 19:37

## 2020-09-13 RX ADMIN — TORSEMIDE 20 MG: 20 TABLET ORAL at 08:46

## 2020-09-13 RX ADMIN — GABAPENTIN 300 MG: 300 CAPSULE ORAL at 08:47

## 2020-09-13 RX ADMIN — MEROPENEM 1 G: 1 INJECTION, POWDER, FOR SOLUTION INTRAVENOUS at 12:16

## 2020-09-13 RX ADMIN — ALLOPURINOL 100 MG: 100 TABLET ORAL at 08:47

## 2020-09-13 RX ADMIN — ONDANSETRON 4 MG: 2 INJECTION INTRAMUSCULAR; INTRAVENOUS at 10:30

## 2020-09-13 RX ADMIN — PROMETHAZINE HYDROCHLORIDE 12.5 MG: 25 TABLET ORAL at 11:06

## 2020-09-13 RX ADMIN — METOPROLOL SUCCINATE 200 MG: 50 TABLET, EXTENDED RELEASE ORAL at 08:47

## 2020-09-13 RX ADMIN — SODIUM CHLORIDE: 9 INJECTION, SOLUTION INTRAVENOUS at 19:36

## 2020-09-13 RX ADMIN — HYDROMORPHONE HYDROCHLORIDE 1 MG: 2 INJECTION, SOLUTION INTRAMUSCULAR; INTRAVENOUS; SUBCUTANEOUS at 19:37

## 2020-09-13 RX ADMIN — FAMOTIDINE 20 MG: 10 INJECTION INTRAVENOUS at 19:37

## 2020-09-13 RX ADMIN — DIGOXIN 125 MCG: 125 TABLET ORAL at 08:47

## 2020-09-13 RX ADMIN — MEROPENEM 1 G: 1 INJECTION, POWDER, FOR SOLUTION INTRAVENOUS at 03:53

## 2020-09-13 RX ADMIN — SODIUM CHLORIDE: 9 INJECTION, SOLUTION INTRAVENOUS at 08:46

## 2020-09-13 RX ADMIN — DOCUSATE SODIUM 100 MG: 100 CAPSULE, LIQUID FILLED ORAL at 11:02

## 2020-09-13 ASSESSMENT — PAIN SCALES - GENERAL: PAINLEVEL_OUTOF10: 9

## 2020-09-13 NOTE — PROGRESS NOTES
Patient is c/o of abd. Distention, increased from yesterday. States he feels like he needs to have BM. Pt requesting laxative. Paged Dr. Juliann Arredondo, waiting on callback.

## 2020-09-13 NOTE — PROGRESS NOTES
Pt with 9/10 abd pain. Medicated with IV dilaudid. Shortly after IV dilaudid pt with CP, dizzy sensation and SpO2 slowly decreasing to 87%. 2 L NC applied. Pt still slowly decreasing to 78%. Increased to 4 L and sternal rub administered. Encouraged pt to stay awake and talk. Narcan overrode but not administered as pt now much more alert with SpO2 93% on 2 L NC. RT paged for STAT EKG. Lab at bedside for stat troponin. Surgery paged. Will request hospitalist consult and continuous SpO2.

## 2020-09-13 NOTE — PROGRESS NOTES
[facility] GENERAL SURGERY    Surgery Progress Note           POD # 2    PATIENT NAME: Mouna Cloud     TODAY'S DATE: 9/13/2020    INTERVAL HISTORY:    Pt with some bloating yesterday, having flatus. OBJECTIVE:   VITALS:  /69   Pulse 103   Temp 97.5 °F (36.4 °C) (Oral)   Resp 18   Ht 6' 5\" (1.956 m)   Wt (!) 434 lb 1.4 oz (196.9 kg)   SpO2 94%   BMI 51.48 kg/m²     INTAKE/OUTPUT:    I/O last 3 completed shifts: In: 3814 [P.O.:2260; I.V.:1554]  Out: 8619 [Urine:3375; Drains:160]  I/O this shift:  In: 227 [I.V.:227]  Out: 110 [Drains:110]              CONSTITUTIONAL:  awake and alert  ABDOMEN:   hypoactive bowel sounds, soft, non-distended, appropriately tender, gisel serosanguinous  INCISION: no drainage    Data:  CBC:   Recent Labs     09/11/20  1031 09/12/20  0628 09/13/20  0557   WBC 11.9* 9.5 9.0   HGB 12.4* 11.2* 12.7*   HCT 37.9* 34.5* 38.8*    130* 177     BMP:    Recent Labs     09/11/20  1031 09/12/20  0628 09/13/20  0557    138 139   K 4.8 4.8 4.4   CL 98* 100 101   CO2 27 26 27   BUN 16 22* 20   CREATININE 0.9 1.0 0.8   GLUCOSE 133* 154* 117*     Hepatic:   Recent Labs     09/11/20  1031   AST 31   ALT 29   BILITOT 1.2*   ALKPHOS 75     Mag:    No results for input(s): MG in the last 72 hours. Phos:   No results for input(s): PHOS in the last 72 hours. INR:   Recent Labs     09/11/20  1031 09/12/20  0628 09/13/20  0557   INR 2.23* 2.07* 1.83*         Radiology Review:  NA    ASSESSMENT AND PLAN:  60 y.o. male status post lap appy for perforated appendicitis  1. Advance to low fiber diet  2. Continue home meds  3. Continue IV abx  4.   Possibly home tomorrow if tolerating diet         Electronically signed by Adelia Arvizu MD

## 2020-09-13 NOTE — PROGRESS NOTES
Shift assessment updated as charted. Patient a/ox4. VSS. Patient denies pain at this time. Diet advanced per surgery, will reassess.

## 2020-09-14 ENCOUNTER — APPOINTMENT (OUTPATIENT)
Dept: GENERAL RADIOLOGY | Age: 65
DRG: 338 | End: 2020-09-14
Payer: MEDICARE

## 2020-09-14 LAB
ANION GAP SERPL CALCULATED.3IONS-SCNC: 12 MMOL/L (ref 3–16)
BASOPHILS ABSOLUTE: 0.1 K/UL (ref 0–0.2)
BASOPHILS RELATIVE PERCENT: 0.7 %
BUN BLDV-MCNC: 26 MG/DL (ref 7–20)
CALCIUM SERPL-MCNC: 9.7 MG/DL (ref 8.3–10.6)
CHLORIDE BLD-SCNC: 99 MMOL/L (ref 99–110)
CO2: 28 MMOL/L (ref 21–32)
CREAT SERPL-MCNC: 0.9 MG/DL (ref 0.8–1.3)
EKG ATRIAL RATE: 102 BPM
EKG DIAGNOSIS: NORMAL
EKG Q-T INTERVAL: 360 MS
EKG QRS DURATION: 106 MS
EKG QTC CALCULATION (BAZETT): 464 MS
EKG R AXIS: 64 DEGREES
EKG T AXIS: -66 DEGREES
EKG VENTRICULAR RATE: 100 BPM
EOSINOPHILS ABSOLUTE: 0.1 K/UL (ref 0–0.6)
EOSINOPHILS RELATIVE PERCENT: 1.1 %
ESTIMATED AVERAGE GLUCOSE: 157.1 MG/DL
GFR AFRICAN AMERICAN: >60
GFR NON-AFRICAN AMERICAN: >60
GLUCOSE BLD-MCNC: 113 MG/DL (ref 70–99)
GLUCOSE BLD-MCNC: 136 MG/DL (ref 70–99)
GLUCOSE BLD-MCNC: 85 MG/DL (ref 70–99)
HBA1C MFR BLD: 7.1 %
HCT VFR BLD CALC: 39 % (ref 40.5–52.5)
HEMOGLOBIN: 12.8 G/DL (ref 13.5–17.5)
INR BLD: 1.74 (ref 0.86–1.14)
LYMPHOCYTES ABSOLUTE: 1.5 K/UL (ref 1–5.1)
LYMPHOCYTES RELATIVE PERCENT: 17.5 %
MCH RBC QN AUTO: 30.4 PG (ref 26–34)
MCHC RBC AUTO-ENTMCNC: 32.8 G/DL (ref 31–36)
MCV RBC AUTO: 92.5 FL (ref 80–100)
MONOCYTES ABSOLUTE: 0.8 K/UL (ref 0–1.3)
MONOCYTES RELATIVE PERCENT: 10 %
NEUTROPHILS ABSOLUTE: 5.9 K/UL (ref 1.7–7.7)
NEUTROPHILS RELATIVE PERCENT: 70.7 %
PDW BLD-RTO: 15 % (ref 12.4–15.4)
PERFORMED ON: ABNORMAL
PERFORMED ON: NORMAL
PLATELET # BLD: 195 K/UL (ref 135–450)
PMV BLD AUTO: 9 FL (ref 5–10.5)
POTASSIUM SERPL-SCNC: 4.8 MMOL/L (ref 3.5–5.1)
PROTHROMBIN TIME: 20.3 SEC (ref 10–13.2)
RBC # BLD: 4.22 M/UL (ref 4.2–5.9)
SODIUM BLD-SCNC: 139 MMOL/L (ref 136–145)
WBC # BLD: 8.3 K/UL (ref 4–11)

## 2020-09-14 PROCEDURE — 2700000000 HC OXYGEN THERAPY PER DAY

## 2020-09-14 PROCEDURE — 2500000003 HC RX 250 WO HCPCS: Performed by: INTERNAL MEDICINE

## 2020-09-14 PROCEDURE — 97530 THERAPEUTIC ACTIVITIES: CPT

## 2020-09-14 PROCEDURE — 74018 RADEX ABDOMEN 1 VIEW: CPT

## 2020-09-14 PROCEDURE — 2580000003 HC RX 258: Performed by: SURGERY

## 2020-09-14 PROCEDURE — 2500000003 HC RX 250 WO HCPCS: Performed by: SURGERY

## 2020-09-14 PROCEDURE — 83036 HEMOGLOBIN GLYCOSYLATED A1C: CPT

## 2020-09-14 PROCEDURE — 94761 N-INVAS EAR/PLS OXIMETRY MLT: CPT

## 2020-09-14 PROCEDURE — 6360000002 HC RX W HCPCS: Performed by: SURGERY

## 2020-09-14 PROCEDURE — 97162 PT EVAL MOD COMPLEX 30 MIN: CPT

## 2020-09-14 PROCEDURE — APPSS45 APP SPLIT SHARED TIME 31-45 MINUTES: Performed by: CLINICAL NURSE SPECIALIST

## 2020-09-14 PROCEDURE — 99024 POSTOP FOLLOW-UP VISIT: CPT | Performed by: SURGERY

## 2020-09-14 PROCEDURE — 97110 THERAPEUTIC EXERCISES: CPT

## 2020-09-14 PROCEDURE — 2580000003 HC RX 258: Performed by: INTERNAL MEDICINE

## 2020-09-14 PROCEDURE — 1200000000 HC SEMI PRIVATE

## 2020-09-14 PROCEDURE — 36415 COLL VENOUS BLD VENIPUNCTURE: CPT

## 2020-09-14 PROCEDURE — 85025 COMPLETE CBC W/AUTO DIFF WBC: CPT

## 2020-09-14 PROCEDURE — 97166 OT EVAL MOD COMPLEX 45 MIN: CPT

## 2020-09-14 PROCEDURE — 85610 PROTHROMBIN TIME: CPT

## 2020-09-14 PROCEDURE — 93010 ELECTROCARDIOGRAM REPORT: CPT | Performed by: INTERNAL MEDICINE

## 2020-09-14 PROCEDURE — 80048 BASIC METABOLIC PNL TOTAL CA: CPT

## 2020-09-14 RX ORDER — SODIUM CHLORIDE 0.9 % (FLUSH) 0.9 %
10 SYRINGE (ML) INJECTION PRN
Status: DISCONTINUED | OUTPATIENT
Start: 2020-09-14 | End: 2020-09-14 | Stop reason: SDUPTHER

## 2020-09-14 RX ORDER — 0.9 % SODIUM CHLORIDE 0.9 %
500 INTRAVENOUS SOLUTION INTRAVENOUS ONCE
Status: COMPLETED | OUTPATIENT
Start: 2020-09-14 | End: 2020-09-14

## 2020-09-14 RX ORDER — LIDOCAINE HYDROCHLORIDE 10 MG/ML
5 INJECTION, SOLUTION INFILTRATION; PERINEURAL ONCE
Status: COMPLETED | OUTPATIENT
Start: 2020-09-14 | End: 2020-09-15

## 2020-09-14 RX ORDER — SODIUM CHLORIDE 9 MG/ML
INJECTION, SOLUTION INTRAVENOUS
Status: DISPENSED
Start: 2020-09-14 | End: 2020-09-14

## 2020-09-14 RX ORDER — METOPROLOL TARTRATE 5 MG/5ML
5 INJECTION INTRAVENOUS EVERY 6 HOURS
Status: DISCONTINUED | OUTPATIENT
Start: 2020-09-14 | End: 2020-09-14 | Stop reason: SDUPTHER

## 2020-09-14 RX ORDER — SODIUM CHLORIDE 0.9 % (FLUSH) 0.9 %
10 SYRINGE (ML) INJECTION EVERY 12 HOURS SCHEDULED
Status: DISCONTINUED | OUTPATIENT
Start: 2020-09-14 | End: 2020-09-14 | Stop reason: SDUPTHER

## 2020-09-14 RX ORDER — METOPROLOL TARTRATE 5 MG/5ML
5 INJECTION INTRAVENOUS EVERY 6 HOURS
Status: DISCONTINUED | OUTPATIENT
Start: 2020-09-14 | End: 2020-09-17

## 2020-09-14 RX ADMIN — FAMOTIDINE 20 MG: 10 INJECTION INTRAVENOUS at 11:40

## 2020-09-14 RX ADMIN — SODIUM CHLORIDE: 9 INJECTION, SOLUTION INTRAVENOUS at 21:34

## 2020-09-14 RX ADMIN — MEROPENEM 1 G: 1 INJECTION, POWDER, FOR SOLUTION INTRAVENOUS at 04:26

## 2020-09-14 RX ADMIN — METOPROLOL TARTRATE 5 MG: 1 INJECTION, SOLUTION INTRAVENOUS at 15:47

## 2020-09-14 RX ADMIN — ONDANSETRON 4 MG: 2 INJECTION INTRAMUSCULAR; INTRAVENOUS at 05:15

## 2020-09-14 RX ADMIN — MEROPENEM 1 G: 1 INJECTION, POWDER, FOR SOLUTION INTRAVENOUS at 13:25

## 2020-09-14 RX ADMIN — SODIUM CHLORIDE 500 ML: 9 INJECTION, SOLUTION INTRAVENOUS at 17:33

## 2020-09-14 ASSESSMENT — PAIN - FUNCTIONAL ASSESSMENT: PAIN_FUNCTIONAL_ASSESSMENT: PREVENTS OR INTERFERES WITH MANY ACTIVE NOT PASSIVE ACTIVITIES

## 2020-09-14 ASSESSMENT — PAIN DESCRIPTION - LOCATION: LOCATION: ABDOMEN

## 2020-09-14 ASSESSMENT — PAIN SCALES - GENERAL: PAINLEVEL_OUTOF10: 5

## 2020-09-14 ASSESSMENT — PAIN DESCRIPTION - PAIN TYPE: TYPE: ACUTE PAIN;SURGICAL PAIN

## 2020-09-14 NOTE — PROGRESS NOTES
Afib on tele, HR now maintaining 130's-150's. All other VSS. Pt NPO, AM PO meds held. Hospitalist messaged FYI to review with request to change to IV. Awaiting response.

## 2020-09-14 NOTE — PROGRESS NOTES
Upon assessing pt found that NGT was partially removed at 38 cm, versus initial placement of 67. Turned off continuous suction and readvanced to 67 cm and reapplied adhesive bandage to keep in place. Paged cross cover NP for order to stat abdominal xray to confirm placement.

## 2020-09-14 NOTE — FLOWSHEET NOTE
Report received from [facility]. Agree with previous assessment. Pt A&Ox4. VSS- /78. NG to CLWS with moderate brown output. Denies needs at this time. Call light within reach, bed in lowest position, wheels locked. Will monitor.

## 2020-09-14 NOTE — PROGRESS NOTES
Pt noted to have loss of IV access prior to shift change. Per dayshift RN, clinical notified and US guided IV placement would be attempted on pt. No personnel up for this as of yet. Pt refusing inspection of feet for IV placement.

## 2020-09-14 NOTE — CONSULTS
Allergen Reactions    Pravastatin      Patient does recall reaction       MEDICATIONS    Current Facility-Administered Medications on File Prior to Encounter   Medication Dose Route Frequency Provider Last Rate Last Dose    lidocaine (LMX) 4 % cream   Topical Once Charmaine Aris, DPM         Current Outpatient Medications on File Prior to Encounter   Medication Sig Dispense Refill    ALLOPURINOL PO Take 100 mg by mouth daily       testosterone (ANDROGEL; TESTIM) 50 MG/5GM (1%) GEL 1% gel Apply topically daily.  spironolactone (ALDACTONE) 25 MG tablet Take 25 mg by mouth daily      atorvastatin (LIPITOR) 10 MG tablet Take 10 mg by mouth daily       clonazePAM (KLONOPIN) 1 MG tablet Take one-half tablet by mouth twice a day as needed for anxiety      digoxin (LANOXIN) 250 MCG tablet Take 125 mcg by mouth daily       gabapentin (NEURONTIN) 300 MG capsule Take 300 mg by mouth 3 times daily.  Levothyroxine Sodium 112 MCG CAPS Take 112 mcg by mouth daily       lisinopril (PRINIVIL;ZESTRIL) 40 MG tablet Take 30 mg by mouth daily       loratadine (CLARITIN) 10 MG tablet Take 10 mg by mouth daily       metoprolol succinate (TOPROL XL) 50 MG extended release tablet Take 200 mg by mouth daily       montelukast (SINGULAIR) 10 MG tablet Take 10 mg by mouth nightly       torsemide (DEMADEX) 20 MG tablet Take 20 mg by mouth daily       vardenafil (LEVITRA) 20 MG tablet Take 1/2 tablet by mouth one hour before sexual activity      warfarin (COUMADIN) 5 MG tablet 5 mg Indications: 2 5mg tablet every day but wednesday and on wednesday take 12.5 mg       azelastine (ASTELIN) 0.1 % nasal spray 1 spray by Nasal route 2 times daily Use in each nostril as directed         Objective NG in place. IV infusing. Bed side RN reports patient is having elevated heart rate and will be transferring to B -3 floor for Cardizem infusion.     BP 94/63   Pulse 90   Temp 97.8 °F (36.6 °C) (Oral)   Resp 17   Ht 6' 5\" (1.956 m)   Wt (!) 434 lb 1.4 oz (196.9 kg)   SpO2 94%   BMI 51.48 kg/m²     LABS:  WBC:    Lab Results   Component Value Date    WBC 8.3 09/14/2020     H/H:    Lab Results   Component Value Date    HGB 12.8 09/14/2020    HCT 39.0 09/14/2020     PTT:    Lab Results   Component Value Date    APTT 34.6 02/13/2019   [APTT}  PT/INR:    Lab Results   Component Value Date    PROTIME 20.3 09/14/2020    INR 1.74 09/14/2020     HgBA1c:    Lab Results   Component Value Date    LABA1C 7.1 09/14/2020       Assessment  Lethargic. Left lower leg very dry and scaly. Cluster of open areas draining small amount of serosanguinous fluid. See photo. 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 ([address]    Morbid obesity ([address]    Peripheral venous insufficiency    Acute appendicitis with localized peritonitis, without perforation, abscess, or gangrene    Perforated appendicitis    Atrial fibrillation ([address]    Hyperlipidemia    Diabetes mellitus ([address]    Hypertension       Measurements:  Wound 09/14/20 leg Left; Lower; Lateral moist open red/brown (Active)   Wound Image   09/14/20 1528   Wound Venous 09/14/20 1528   Dressing Status Changed 09/14/20 1528   Dressing Changed Changed/New 09/14/20 1528   Dressing/Treatment Xeroform;Dry dressing;Roll gauze 09/14/20 1528   Wound Cleansed Wound cleanser 09/14/20 1528   Wound Length (cm) 11 cm 09/14/20 1528   Wound Width (cm) 7 cm 09/14/20 1528   Wound Depth (cm) 0.1 cm 09/14/20 1528   Wound Surface Area (cm^2) 77 cm^2 09/14/20 1528   Wound Volume (cm^3) 7.7 cm^3 09/14/20 1528   Distance Tunneling (cm) 0 cm 09/14/20 1528   Tunneling Position ___ O'Clock 0 09/14/20 1528   Undermining Starts ___ O'Clock 0 09/14/20 1528   Undermining Ends___ O'Clock 0 09/14/20 1528 Undermining Maxium Distance (cm) 0 09/14/20 1528   Wound Assessment Red;Brown 09/14/20 1528   Drainage Amount Small 09/14/20 1528   Drainage Description Serosanguinous 09/14/20 1528   Odor None 09/14/20 1528   Margins Attached edges; Defined edges 09/14/20 1528   Libby-wound Assessment Dry; Other (Comment) 09/14/20 1528   Non-staged Wound Description Partial thickness 09/14/20 1528   Red%Wound Bed 60 09/14/20 1528   Other%Wound Bed 40% brown 09/14/20 1528   Culture Taken No 09/14/20 1528   Number of days: 0     Left lower lateral leg: Incision 09/11/20 Other (Comment) (Active)   Wound Image   09/11/20 2059   Wound Assessment Bleeding;Edema;Maroon;Non-blanchable erythema;Swelling 09/13/20 2001   Libby-wound Assessment [place];Blanchable erythema;Edema; Intact; Purple;Red;Swelling 09/13/20 2001   Closure Other (Comment) 09/13/20 2001   Drainage Amount Small 09/13/20 2001   Drainage Description Sanguinous 09/13/20 2001   Odor None 09/13/20 2001   Dressing/Treatment ABD;Gauze dressing/ dressing sponge 09/13/20 2001   Dressing Status Clean;Dry; Intact 09/11/20 1820   Number of days: 3       Incision 09/11/20 Abdomen (Active)   Wound Assessment Clean;Dry; Intact 09/13/20 2001   Libby-wound Assessment Clean;Dry; Intact 09/13/20 2001   Closure Surgical glue; Adhesive bandage 09/13/20 2001   Drainage Amount None 09/13/20 2001   Drainage Description Serosanguinous 09/13/20 2001   Odor None 09/13/20 2001   Dressing/Treatment Surgical glue 09/11/20 1820   Dressing Status Intact 09/13/20 2001   Number of days: 2         Response to treatment:  Well tolerated by patient. Pain Assessment:  Severity:  0 / 10  Quality of pain: N/A  Wound Pain Timing/Severity: none  Premedicated: No    Plan   Plan of Care: Incision 09/11/20 Other (Comment)-Dressing/Treatment: ABD, Gauze dressing/ dressing sponge  Incision 09/11/20 Abdomen-Dressing/Treatment: Surgical glue  Wound 09/14/20 Pretibial Left; Lower; Lateral moist open red/brown-Dressing/Treatment: Xeroform, Dry dressing, Roll gauze     Recommend:  Clean left lower leg with foamy cleanser. Apply xeroform guaze to open areas left lower lateral leg. Cover with 4x4, abd and secure with roll guaze and ace wraps (x2) form toes to knees. Change dressing daily. Call wound care for deterioration [phone], Pager [phone]. Wound care to follow.       Specialty Bed Required : Yes   [] Low Air Loss   [x] Pressure Redistribution isoflex mattress - bed extender has been ordered as patient tall 6' 5\"  [] Fluid Immersion  [] Bariatric  [] Total Pressure Relief  [] Other:     Current Diet: Diet NPO Effective Now Exceptions are: Sips with Meds, Ice Chips  Dietician consult:  Yes    Discharge Plan:  Placement for patient upon discharge: Unknown at this time  Patient appropriate for Outpatient [address]: No    Referrals:  []  / discharge planner following  [] [address] [employer] [facility]  [] Supplies  [] Other    Patient/Caregiver Teaching: Instructed on treatment. Upon returning home, resume current treatment of cleaning with antibacterial soap and applying bag balm ointment daily. Cover if draining.   Level of patient/caregiver understanding able to:   [] Indicates understanding       [x] Needs reinforcement  [] Unsuccessful      [] Verbal Understanding  [] Demonstrated understanding       [] No evidence of learning  [] Refused teaching         [] N/A       Electronically signed by Satya Awan RN, MSN, Soraya Amaya on 9/14/2020 at 3:48 PM

## 2020-09-14 NOTE — PROGRESS NOTES
Before starting diltiazem drip, BP was found to be 92/59. Per Dr. Carlos A Polanco, give 500mL bolus and recheck pressure before starting drip. Will continue to monitor.

## 2020-09-14 NOTE — PROGRESS NOTES
Physical Therapy    Facility/Department: [facility] C3 TELE/MED SURG/ONC  Initial Assessment    NAME: Fortino Sellers  : 1955  MRN: [id number]    Date of Service: 2020    Discharge Recommendations:  Continue to assess pending progress   PT Equipment Recommendations  Equipment Needed: No(CTA)    Assessment   Body structures, Functions, Activity limitations: Decreased functional mobility ; Decreased strength;Decreased endurance;Decreased balance  Assessment: Pt functioning below baseline after appendenctomy. Pt resting in bed and agreeable to sit up with encouragement. Pt with limited mobility at baseline using WC around the house and a walker to get in the bathroom but reported he was able to manage 2 steps in/out of the house. Limited activity due to elevated HR. Will continue to toassess for  FlowMetric. May need SNF to improve functional strength for safe mobility due to limited assist at home. Treatment Diagnosis: decreased mobility  Prognosis: Good  Decision Making: High Complexity  PT Education: Goals; General Safety;Gait Training;Disease Specific Education;PT Role;Plan of Care  Patient Education: Pt expressed understanding on benefits of OOB activity  Barriers to Learning: none  REQUIRES PT FOLLOW UP: Yes  Activity Tolerance  Activity Tolerance: Patient Tolerated treatment well;Patient limited by pain  Activity Tolerance: elevated HR to 140 sitting EOB. Returned to 113 returning to supine       Patient Diagnosis(es): The primary encounter diagnosis was Acute appendicitis with localized peritonitis, without perforation, abscess, or gangrene. Diagnoses of Right lower quadrant abdominal pain, Chronic atrial fibrillation, and Chronic anticoagulation were also pertinent to this visit. has a past medical history of Asthma, Atrial fibrillation ([address] Diabetes mellitus ([address] Hyperlipidemia, Hypertension, and Thyroid disease.    has a past surgical history that includes Cardiac catheterization (Left, 2015); Cardiac defibrillator placement (2016); ablation of dysrhythmic focus (2009 AND 2009); and laparoscopic appendectomy (N/A, 9/11/2020). Restrictions  Restrictions/Precautions  Restrictions/Precautions: General Precautions, Fall Risk  Position Activity Restriction  Other position/activity restrictions: NG to suction, DESTIN, IV, telemetry  Vision/Hearing  Vision: Within Functional Limits  Hearing: Within functional limits     Subjective  General  Chart Reviewed: Yes  Patient assessed for rehabilitation services?: Yes  Response To Previous Treatment: Not applicable  Family / Caregiver Present: No  Referring Practitioner: Elton Calvillo MD  Referral Date : 09/14/20  Diagnosis: perforated appendicitis  Follows Commands: Within Functional Limits  General Comment  Comments: cleared by nursing  Subjective  Subjective: pt resting in bed. Reports improved pain since NJ tube was placed over night. Never gave number on NRS but reported it was well controlled at this time.   Pain Screening  Patient Currently in Pain: Yes          Orientation  Orientation  Overall Orientation Status: Within Normal Limits  Social/Functional History  Social/Functional History  Lives With: Friend(s)  Type of Home: House  Home Layout: One level  Home Access: Stairs to enter with rails  Entrance Stairs - Number of Steps: 2  Entrance Stairs - Rails: Right  Bathroom Shower/Tub: Walk-in shower(reports shower to small & has been sponge bathing due to Left LE wound)  Bathroom Toilet: Handicap height  Bathroom Accessibility: Not accessible  Home Equipment: [employer], Electric scooter, 4 wheeled walker, Reacher, Sock aid, Long-handled shoehorn  Receives Help From: Friend(s)(RN visits for wound care & \"homeless\" friends who having been assisting with cleaning, laundry for about a year\")  ADL Assistance: Independent  Homemaking Assistance: Independent  Homemaking Responsibilities: Yes  Meal Prep Responsibility: Primary  Shopping Responsibility: Primary  Other (Comment): riding mower  Ambulation Assistance: Independent(limited. 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 Grossly 3/5  Strength LLE  Comment: unable to formally assess due to positioning restrictions. Grossly 3/5  Tone RLE  RLE Tone: Normotonic  Tone LLE  LLE Tone: Normotonic  Sensation  Overall Sensation Status: WFL  Bed mobility  Supine to Sit: Minimal assistance(elevated HOB.)  Sit to Supine: Moderate assistance(B LE)  Scooting: Stand by assistance  Transfers  Sit to Stand: Unable to assess  Ambulation  Ambulation?: No     Balance  Sitting - Static: Good  Sitting - Dynamic: Good        Plan   Plan  Times per week: 3-5x/week  Current Treatment Recommendations: Strengthening, Balance Training, Endurance Training, Functional Mobility Training, Home Exercise Program, Positioning, Modalities  Safety Devices  Type of devices:  All fall risk precautions in place, Bed alarm in place, Call light within reach, Gait belt, Patient at risk for falls, Left in bed, Nurse notified  Restraints  Initially in place: No      AM-PAC Score  AM-PAC Inpatient Mobility Raw Score : 10 (09/14/20 1612)  AM-PAC Inpatient T-Scale Score : 32.29 (09/14/20 1612)  Mobility Inpatient CMS 0-100% Score: 76.75 (09/14/20 1612)  Mobility Inpatient CMS G-Code Modifier : CL (09/14/20 1612)          Goals  Short term goals  Time Frame for Short term goals: 9/20  Short term goal 1: Pt will complete bed mobility with SBA  Short term goal 2: Pt will ambulate x 20' with RW with CGA  Short term goal 3: Pt will copmlete B LE to improve functional mobility by 9/16  Patient Goals   Patient goals : to go home       Therapy Time   Individual Concurrent Group Co-treatment   Time In 1420         Time Out 1453         Minutes 33         Timed Code Treatment Minutes: BRANDI Rizo    If pt is unable to be seen after this session, please let this note serve as discharge summary. Please see case management note for discharge disposition. Thank you.

## 2020-09-14 NOTE — PROGRESS NOTES
Physician Progress Note      PATIENTGreleno Smith  CSN #:                  [id number]  :                       1955  ADMIT DATE:       2020 10:20 AM  DISCH DATE:  RESPONDING  PROVIDER #:        GIL Martinez .S.  MD          QUERY TEXT:    Pt admitted with  Acute perforated appendicitis s/p appendectomy. Noted   documentation of \" Acute episode of hypoxia, nausea, chest pain\" on  . Please document in progress notes and discharge summary the cause and   correlating diagnosis to support such: The medical record reflects the following:  Risk Factors: s/p appy, PO ileus, afib  Clinical Indicators: Hosp Consult -\"after receiving the dilaudid, the   patient began experiencing chest pain, dizziness and became hypoxic at 78%.  He   was placed on 4 LNC\"  remains on 2l per NC O2 sat 91% - NN-Afib on   tele-'s-150's  Treatment: Hospitalist consult, continuous o2 sat monitoing with tele, EKG,    oxygen per NC as needed, minimize opiates if possible, supportive care  Options provided:  -- Acute Postoperative Pulmonary Insufficiency  -- Acute postoperative respiratory Failure  -- Other - I will add my own diagnosis  -- Disagree - Not applicable / Not valid  -- Disagree - Clinically unable to determine / Unknown  -- Refer to Clinical Documentation Reviewer    PROVIDER RESPONSE TEXT:    Note edit    Query created by: Reyna Negron on 2020 3:51 PM      Electronically signed by:  Mechelle Thomas MD 2020 6:07 PM

## 2020-09-14 NOTE — PROGRESS NOTES
treatment. Placing patient at risk for multiple co-morbidities as well as early death and contributing to the patient's presentation. Counseled on weight loss     DM2 with peripheral neuropathy, controlled.   Hgb a1c 7.1  -ldssi  -poct ac/hs  -hypoglycemia protocol  -carb control diet when eating, currently NPO  -continue gabapentin     PMH of Essential HTN, hld  -continue home regimen    Diet: Diet NPO Effective Now Exceptions are: Sips with Meds, Ice Chips  Code Status: Full Code    Dispo - Scottie Hampton MD

## 2020-09-14 NOTE — PROGRESS NOTES
Dr. Berenice Yepez  Diagnosis: perforated appendix; s/p lap appy 9-11-20  Patient Currently in Pain: Yes  Pain Assessment  Pain Assessment: 0-10  Pain Level: 5  Pain Type: Acute pain;Surgical pain  Pain Location: Abdomen  Functional Pain Assessment: Prevents or interferes with many active not passive activities  Non-Pharmaceutical Pain Intervention(s): Therapeutic presence;Repositioned  Pre Treatment Pain Screening  Intervention List: Patient able to continue with treatment    Social/Functional History  Social/Functional History  Lives With: Friend(s)  Type of Home: House  Home Layout: One level  Home Access: Stairs to enter with rails  Entrance Stairs - Number of Steps: 2  Entrance Stairs - Rails: Right  Bathroom Shower/Tub: Walk-in shower(reports shower to small & has been sponge bathing due to Left LE wound)  Bathroom Toilet: Handicap height  Bathroom Accessibility: Not accessible  Home Equipment: [employer], Electric scooter, 4 wheeled walker, Reacher, Sock aid, Long-handled shoehorn  Receives Help From: Friend(s)(RN visits for wound care & \"homeless\" friends who having been assisting with cleaning, laundry for about a year\")  ADL Assistance: Independent  Homemaking Assistance: Independent  Homemaking Responsibilities: Yes  Meal Prep Responsibility: Primary  Shopping Responsibility: Primary  Other (Comment): riding mower  Ambulation Assistance: Independent(limited.  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       Objective        Orientation  Overall Orientation Status: Within Functional Limits  Observation/Palpation  Observation: dressing on L LE  Balance  Sitting Balance: Supervision  Standing Balance: Unable to assess(comment)(refused to stand or get to chair)  ADL  Feeding: NPO  LE Dressing: Maximum assistance(with non-skid socks)    RUE Tone: Normotonic  LUE Tone: Normotonic  Coordination  Movements Are Fluid And Coordinated:  Yes              Cognition  Overall Cognitive Status: WFL        Sensation  Overall Sensation Status: WFL    LUE AROM : WFL  RUE AROM : WFL     Plan   Plan  Times per week: 2-4x/ week  Current Treatment Recommendations: Functional Mobility Training, Self-Care / ADL, Endurance Training, Safety Education & Training    AM-PAC Score  Self care score = 12; G-code = CL    Goals  Short term goals  Time Frame for Short term goals: 1 week(9-21-20)  Short term goal 1: supervision with bathroom mobility with RW by 9-21-20  Short term goal 2: supervision standing ADL's for 2-3 minutes  Short term goal 3: set up for LE self care  Patient Goals   Patient goals : be able to get to bathroom for a bowel movement       Therapy Time   Individual Concurrent Group Co-treatment   Time In 1425         Time Out 1450         Minutes [address]

## 2020-09-14 NOTE — PROGRESS NOTES
Writer had requested PM hospitalist review KUB to ensure NGT properly placed given faint air bolus and large body habitus. PM hospitalist unable to see [facility]. \" Radiologist reviewed KUB and CXR with recommendation to reposition NGT. Writer spoke directly with radiologist who believes NGT should be advanced at least 3-4 cm. Writer advanced NGT 6 cm and will order repeat KUB per radiology request. Call light within reach, will continue to monitor.

## 2020-09-14 NOTE — CONSULTS
for anxiety   Yes Historical Provider, MD   digoxin (LANOXIN) 250 MCG tablet Take 125 mcg by mouth daily  8/21/17  Yes Historical Provider, MD   gabapentin (NEURONTIN) 300 MG capsule Take 300 mg by mouth 3 times daily. Yes Historical Provider, MD   Levothyroxine Sodium 112 MCG CAPS Take 112 mcg by mouth daily    Yes Historical Provider, MD   lisinopril (PRINIVIL;ZESTRIL) 40 MG tablet Take 30 mg by mouth daily  3/8/17  Yes Historical Provider, MD   loratadine (CLARITIN) 10 MG tablet Take 10 mg by mouth daily    Yes Historical Provider, MD   metoprolol succinate (TOPROL XL) 50 MG extended release tablet Take 200 mg by mouth daily  8/28/17  Yes Historical Provider, MD   montelukast (SINGULAIR) 10 MG tablet Take 10 mg by mouth nightly    Yes Historical Provider, MD   torsemide (DEMADEX) 20 MG tablet Take 20 mg by mouth daily  4/13/16  Yes Historical Provider, MD   vardenafil (LEVITRA) 20 MG tablet Take 1/2 tablet by mouth one hour before sexual activity   Yes Historical Provider, MD   warfarin (COUMADIN) 5 MG tablet 5 mg Indications: 2 5mg tablet every day but wednesday and on wednesday take 12.5 mg    Yes Historical Provider, MD   azelastine (ASTELIN) 0.1 % nasal spray 1 spray by Nasal route 2 times daily Use in each nostril as directed   Yes Historical Provider, MD     Allergies:  Pravastatin    Social History:      The patient currently lives at home. Plans to return home upon discharge    TOBACCO:   reports that he has never smoked. He quit smokeless tobacco use about 32 years ago. His smokeless tobacco use included snuff. ETOH:   reports no history of alcohol use. Family History:     Reviewed in detail. Positive as follows:        Problem Relation Age of Onset   [facility] Cancer Mother     Cancer Father      REVIEW OF SYSTEMS:   Pertinent positives as noted in the HPI. All other systems reviewed and negative.     PHYSICAL EXAM PERFORMED:    /70   Pulse 105   Temp 98.1 °F (36.7 °C) (Oral)   Resp 18   Ht 6' 5\" (1.956 m)   Wt (!) 434 lb 1.4 oz (196.9 kg)   SpO2 96%   BMI 51.48 kg/m²      General appearance: Pleasant, obese male in no apparent distress, appears stated age and cooperative. HEENT: Pupils equal, round, and reactive to light. Extra ocular muscles intact. Conjunctivae/corneas clear. Neck: Supple, with full range of motion. No jugular venous distention. Trachea midline. Respiratory:  Normal respiratory effort. Clear to auscultation, bilaterally without Rales/Wheezes/Rhonchi. 2 lnc  Cardiovascular: Regular rate and rhythm with normal S1/S2 without murmurs, rubs or gallops. Abdomen: Soft, obese, round tender, non-distended with normal bowel sounds. Musculoskeletal: No clubbing, cyanosis or edema bilaterally. Skin: Skin color, texture, turgor normal.  OR lap incisions C,D & I  Neurologic:  Neurovascularly intact. Cranial nerves: II-XII intact, grossly non-focal.  Psychiatric: Alert and oriented, thought content appropriate, normal insight  Capillary Refill: Brisk,< 3 seconds   Peripheral Pulses: +2 palpable, equal bilaterally     Labs:     Recent Labs     09/11/20  1031 09/12/20  0628 09/13/20  0557   WBC 11.9* 9.5 9.0   HGB 12.4* 11.2* 12.7*   HCT 37.9* 34.5* 38.8*    130* 177     Recent Labs     09/11/20  1031 09/12/20  0628 09/13/20  0557    138 139   K 4.8 4.8 4.4   CL 98* 100 101   CO2 27 26 27   BUN 16 22* 20   CREATININE 0.9 1.0 0.8   CALCIUM 9.7 9.3 9.5     Recent Labs     09/11/20  1031   AST 31   ALT 29   BILITOT 1.2*   ALKPHOS 75     Recent Labs     09/11/20  1031 09/12/20  0628 09/13/20  0557   INR 2.23* 2.07* 1.83*     Urinalysis:    Lab Results   Component Value Date    NITRU Negative 09/11/2020    BLOODU Negative 09/11/2020    SPECGRAV 1.015 09/11/2020    GLUCOSEU Negative 09/11/2020     Radiology: I have reviewed the radiology reports with the following interpretation:     XR ACUTE ABD SERIES CHEST 1 VW   Final Result   1. Mild bibasilar atelectasis.    2. Distended air-filled

## 2020-09-14 NOTE — PROGRESS NOTES
[facility] GENERAL SURGERY    Surgery Progress Note           POD # 3    PATIENT NAME: Aleah Paris     TODAY'S DATE: 9/14/2020    INTERVAL HISTORY:    Pt some better after ngt insertion, still some bloating. No BMs. OBJECTIVE:   VITALS:  BP 94/63   Pulse 90   Temp 97.8 °F (36.6 °C) (Oral)   Resp 17   Ht 6' 5\" (1.956 m)   Wt (!) 434 lb 1.4 oz (196.9 kg)   SpO2 94%   BMI 51.48 kg/m²     INTAKE/OUTPUT:    I/O last 3 completed shifts: In: 1910 [P.O.:390; I.V.:1520]  Out: 2120 [Urine:1320; Emesis/NG output:150; Drains:650]  I/O this shift:  In: -   Out: 1275 [Urine:100; Emesis/NG output:1000; Drains:175]              CONSTITUTIONAL:  awake and alert  ABDOMEN:   hypoactive bowel sounds, soft, mod distended, appropriately tender, gisel serosanguinous  INCISION: no drainage    Data:  CBC:   Recent Labs     09/12/20 0628 09/13/20 0557 09/14/20  0957   WBC 9.5 9.0 8.3   HGB 11.2* 12.7* 12.8*   HCT 34.5* 38.8* 39.0*   * 177 195     BMP:    Recent Labs     09/12/20 0628 09/13/20  0557 09/14/20  0600    139 139   K 4.8 4.4 4.8    101 99   CO2 26 27 28   BUN 22* 20 26*   CREATININE 1.0 0.8 0.9   GLUCOSE 154* 117* 136*     Hepatic:   No results for input(s): AST, ALT, ALB, BILITOT, ALKPHOS in the last 72 hours. Mag:    No results for input(s): MG in the last 72 hours. Phos:   No results for input(s): PHOS in the last 72 hours. INR:   Recent Labs     09/12/20 0628 09/13/20  0557   INR 2.07* 1.83*       ASSESSMENT AND PLAN:  60 y.o. male status post lap appy for perforated appendicitis    PO Ileus: continue with ngt to suction, AXR in AM  Afib: appreciate IM following  Activity: PT/OT  Morbid Obesity: BMI : 17.44 Complicating assessment and treatment. Placing patient at risk for multiple co-morbidities and complications. ID: continue with IV antibiotics given perforation    Electronically signed by AIDA Song CNP     Patient seen and agree with above.     Michelle Santillan MD

## 2020-09-14 NOTE — PROGRESS NOTES
Patient in room 351. VSS. Patient oriented to room. Bed in lowest position, call light within reach.

## 2020-09-14 NOTE — DISCHARGE INSTR - COC
layer exposed ([facility] L97.222    Morbid obesity ([facility]) E66.01    Peripheral venous insufficiency I87.2    Acute appendicitis with localized peritonitis, without perforation, abscess, or gangrene K35.30    Perforated appendicitis K35.32    Atrial fibrillation ([facility]) I48.91    Hyperlipidemia E78.5    Diabetes mellitus ([facility] E11.9    Hypertension I10       Isolation/Infection:   Isolation            No Isolation          Patient Infection Status       Infection Onset Added Last Indicated Last Indicated By Review Planned Expiration Resolved Resolved By    [facility] 11/11/19 11/14/19 11/11/19 Wound Culture                Nurse Assessment:  Last Vital Signs: BP 94/63   Pulse 90   Temp 97.8 °F (36.6 °C) (Oral)   Resp 17   Ht 6' 5\" (1.956 m)   Wt (!) 434 lb 1.4 oz (196.9 kg)   SpO2 94%   BMI 51.48 kg/m²     Last documented pain score (0-10 scale): Pain Level: 9  Last Weight:   Wt Readings from Last 1 Encounters:   09/11/20 (!) 434 lb 1.4 oz (196.9 kg)     Mental Status:  Alert, oriented    IV Access:  - None    Nursing Mobility/ADLs:  Walking   Independent  Transfer  Independent  Bathing  Independent  Dressing  Independent  Toileting  Independent  Feeding  Independent  Med Admin  Independent  Med Delivery   whole    Wound Care Documentation and Therapy:  Wound 09/14/20 Leg Left; Lower; Lateral moist open red/brown (Active)   Wound Image   09/14/20 1528   Wound Venous 09/14/20 1528   Dressing Status Changed 09/14/20 1528   Dressing Changed Changed/New 09/14/20 1528   Dressing/Treatment Xeroform;Dry dressing;Roll gauze 09/14/20 1528   Wound Cleansed Wound cleanser 09/14/20 1528   Wound Length (cm) 11 cm 09/14/20 1528   Wound Width (cm) 7 cm 09/14/20 1528   Wound Depth (cm) 0.1 cm 09/14/20 1528   Wound Surface Area (cm^2) 77 cm^2 09/14/20 1528   Wound Volume (cm^3) 7.7 cm^3 09/14/20 1528   Distance Tunneling (cm) 0 cm 09/14/20 1528   Tunneling Position ___ O'Clock 0 09/14/20 1528   Undermining Starts ___ O'Clock 0 09/14/20 9/11/20    Readmission Risk Assessment Score:  Readmission Risk              Risk of Unplanned Readmission:        24           Discharging to Facility/ Agency   · Name: [facility]   · Phone: [phone]  · Fax:    Dialysis Facility (if applicable)  N/A  · Name:  · Address:  · Dialysis Schedule:  · Phone:  · Fax:    / signature: Electronically signed by Sheela Medel RN on 9/25/20 at 10:03 AM EDT    PHYSICIAN SECTION    Prognosis: Good    Condition at Discharge: Stable    Rehab Potential (if transferring to Rehab): Good    Recommended Labs or Other Treatments After Discharge: none  Recommended Follow-up, Labs or Other Treatments After Discharge:      F/u with Dr. Obregon [facility] in 2 week           Physician Certification: I certify the above information and transfer of Jean-Pierre Hernandez  is necessary for the continuing treatment of the diagnosis listed and that he requires Home Care for less 30 days.      Update Admission H&P: No change in H&P    PHYSICIAN SIGNATURE:  Electronically signed by Princess Matty MD on 9/25/20 at 9:32 AM EDT

## 2020-09-15 ENCOUNTER — APPOINTMENT (OUTPATIENT)
Dept: GENERAL RADIOLOGY | Age: 65
DRG: 338 | End: 2020-09-15
Payer: MEDICARE

## 2020-09-15 LAB
ANION GAP SERPL CALCULATED.3IONS-SCNC: 12 MMOL/L (ref 3–16)
BLOOD CULTURE, ROUTINE: NORMAL
BUN BLDV-MCNC: 26 MG/DL (ref 7–20)
CALCIUM SERPL-MCNC: 9.3 MG/DL (ref 8.3–10.6)
CHLORIDE BLD-SCNC: 101 MMOL/L (ref 99–110)
CO2: 26 MMOL/L (ref 21–32)
CREAT SERPL-MCNC: 0.8 MG/DL (ref 0.8–1.3)
CULTURE, BLOOD 2: NORMAL
GFR AFRICAN AMERICAN: >60
GFR NON-AFRICAN AMERICAN: >60
GLUCOSE BLD-MCNC: 100 MG/DL (ref 70–99)
GLUCOSE BLD-MCNC: 109 MG/DL (ref 70–99)
GLUCOSE BLD-MCNC: 118 MG/DL (ref 70–99)
GLUCOSE BLD-MCNC: 89 MG/DL (ref 70–99)
GLUCOSE BLD-MCNC: 96 MG/DL (ref 70–99)
HCT VFR BLD CALC: 36.1 % (ref 40.5–52.5)
HEMOGLOBIN: 12 G/DL (ref 13.5–17.5)
INR BLD: 1.54 (ref 0.86–1.14)
MCH RBC QN AUTO: 30.2 PG (ref 26–34)
MCHC RBC AUTO-ENTMCNC: 33.3 G/DL (ref 31–36)
MCV RBC AUTO: 90.5 FL (ref 80–100)
PDW BLD-RTO: 14.8 % (ref 12.4–15.4)
PERFORMED ON: ABNORMAL
PERFORMED ON: ABNORMAL
PERFORMED ON: NORMAL
PERFORMED ON: NORMAL
PLATELET # BLD: 175 K/UL (ref 135–450)
PMV BLD AUTO: 8.6 FL (ref 5–10.5)
POTASSIUM SERPL-SCNC: 4.2 MMOL/L (ref 3.5–5.1)
PROTHROMBIN TIME: 17.9 SEC (ref 10–13.2)
RBC # BLD: 3.99 M/UL (ref 4.2–5.9)
SODIUM BLD-SCNC: 139 MMOL/L (ref 136–145)
WBC # BLD: 6.9 K/UL (ref 4–11)

## 2020-09-15 PROCEDURE — 6360000002 HC RX W HCPCS: Performed by: INTERNAL MEDICINE

## 2020-09-15 PROCEDURE — 2580000003 HC RX 258

## 2020-09-15 PROCEDURE — 6360000002 HC RX W HCPCS: Performed by: SURGERY

## 2020-09-15 PROCEDURE — 85610 PROTHROMBIN TIME: CPT

## 2020-09-15 PROCEDURE — 71045 X-RAY EXAM CHEST 1 VIEW: CPT

## 2020-09-15 PROCEDURE — 2580000003 HC RX 258: Performed by: INTERNAL MEDICINE

## 2020-09-15 PROCEDURE — 99024 POSTOP FOLLOW-UP VISIT: CPT | Performed by: SURGERY

## 2020-09-15 PROCEDURE — 2580000003 HC RX 258: Performed by: EMERGENCY MEDICINE

## 2020-09-15 PROCEDURE — 2500000003 HC RX 250 WO HCPCS: Performed by: INTERNAL MEDICINE

## 2020-09-15 PROCEDURE — 76937 US GUIDE VASCULAR ACCESS: CPT

## 2020-09-15 PROCEDURE — 2580000003 HC RX 258: Performed by: SURGERY

## 2020-09-15 PROCEDURE — 2500000003 HC RX 250 WO HCPCS: Performed by: NURSE PRACTITIONER

## 2020-09-15 PROCEDURE — 97110 THERAPEUTIC EXERCISES: CPT

## 2020-09-15 PROCEDURE — 6370000000 HC RX 637 (ALT 250 FOR IP): Performed by: INTERNAL MEDICINE

## 2020-09-15 PROCEDURE — 1200000000 HC SEMI PRIVATE

## 2020-09-15 PROCEDURE — 85027 COMPLETE CBC AUTOMATED: CPT

## 2020-09-15 PROCEDURE — 97530 THERAPEUTIC ACTIVITIES: CPT

## 2020-09-15 PROCEDURE — C1751 CATH, INF, PER/CENT/MIDLINE: HCPCS

## 2020-09-15 PROCEDURE — 80048 BASIC METABOLIC PNL TOTAL CA: CPT

## 2020-09-15 PROCEDURE — 74019 RADEX ABDOMEN 2 VIEWS: CPT

## 2020-09-15 PROCEDURE — 2500000003 HC RX 250 WO HCPCS: Performed by: SURGERY

## 2020-09-15 PROCEDURE — 99222 1ST HOSP IP/OBS MODERATE 55: CPT | Performed by: INTERNAL MEDICINE

## 2020-09-15 PROCEDURE — 6370000000 HC RX 637 (ALT 250 FOR IP): Performed by: SURGERY

## 2020-09-15 PROCEDURE — APPSS45 APP SPLIT SHARED TIME 31-45 MINUTES: Performed by: CLINICAL NURSE SPECIALIST

## 2020-09-15 RX ORDER — AMIODARONE HYDROCHLORIDE 200 MG/1
200 TABLET ORAL DAILY
Status: DISCONTINUED | OUTPATIENT
Start: 2020-09-15 | End: 2020-09-18

## 2020-09-15 RX ORDER — SODIUM CHLORIDE 9 MG/ML
INJECTION, SOLUTION INTRAVENOUS
Status: COMPLETED
Start: 2020-09-15 | End: 2020-09-15

## 2020-09-15 RX ORDER — DIGOXIN 0.25 MG/ML
250 INJECTION INTRAMUSCULAR; INTRAVENOUS ONCE
Status: COMPLETED | OUTPATIENT
Start: 2020-09-15 | End: 2020-09-15

## 2020-09-15 RX ADMIN — MONTELUKAST SODIUM 10 MG: 10 TABLET, FILM COATED ORAL at 20:28

## 2020-09-15 RX ADMIN — DILTIAZEM HYDROCHLORIDE 10 MG/HR: 5 INJECTION INTRAVENOUS at 16:01

## 2020-09-15 RX ADMIN — SODIUM CHLORIDE: 9 INJECTION, SOLUTION INTRAVENOUS at 04:57

## 2020-09-15 RX ADMIN — MEROPENEM 1 G: 1 INJECTION, POWDER, FOR SOLUTION INTRAVENOUS at 14:03

## 2020-09-15 RX ADMIN — DILTIAZEM HYDROCHLORIDE 15 MG/HR: 5 INJECTION INTRAVENOUS at 17:21

## 2020-09-15 RX ADMIN — DIGOXIN 125 MCG: 125 TABLET ORAL at 08:38

## 2020-09-15 RX ADMIN — Medication 10 ML: at 11:28

## 2020-09-15 RX ADMIN — AMIODARONE HYDROCHLORIDE 200 MG: 200 TABLET ORAL at 11:26

## 2020-09-15 RX ADMIN — DOCUSATE SODIUM 100 MG: 100 CAPSULE, LIQUID FILLED ORAL at 08:39

## 2020-09-15 RX ADMIN — Medication 10 ML: at 20:28

## 2020-09-15 RX ADMIN — MEROPENEM 1 G: 1 INJECTION, POWDER, FOR SOLUTION INTRAVENOUS at 22:22

## 2020-09-15 RX ADMIN — DIGOXIN 250 MCG: 250 INJECTION, SOLUTION INTRAMUSCULAR; INTRAVENOUS; PARENTERAL at 14:03

## 2020-09-15 RX ADMIN — DILTIAZEM HYDROCHLORIDE 5 MG/HR: 5 INJECTION INTRAVENOUS at 14:53

## 2020-09-15 RX ADMIN — LIDOCAINE HYDROCHLORIDE 5 ML: 10 INJECTION, SOLUTION INFILTRATION; PERINEURAL at 07:09

## 2020-09-15 RX ADMIN — METOPROLOL TARTRATE 5 MG: 1 INJECTION, SOLUTION INTRAVENOUS at 20:28

## 2020-09-15 RX ADMIN — ALLOPURINOL 100 MG: 100 TABLET ORAL at 08:32

## 2020-09-15 RX ADMIN — FAMOTIDINE 20 MG: 10 INJECTION INTRAVENOUS at 08:38

## 2020-09-15 RX ADMIN — SODIUM CHLORIDE: 9 INJECTION, SOLUTION INTRAVENOUS at 22:23

## 2020-09-15 RX ADMIN — GABAPENTIN 300 MG: 300 CAPSULE ORAL at 20:28

## 2020-09-15 RX ADMIN — LEVOTHYROXINE SODIUM 112 MCG: 0.11 TABLET ORAL at 11:26

## 2020-09-15 RX ADMIN — ATORVASTATIN CALCIUM 10 MG: 10 TABLET, FILM COATED ORAL at 20:28

## 2020-09-15 RX ADMIN — HYDROMORPHONE HYDROCHLORIDE 1 MG: 2 INJECTION, SOLUTION INTRAMUSCULAR; INTRAVENOUS; SUBCUTANEOUS at 19:45

## 2020-09-15 RX ADMIN — FAMOTIDINE 20 MG: 10 INJECTION INTRAVENOUS at 20:27

## 2020-09-15 RX ADMIN — WARFARIN SODIUM 12.5 MG: 2.5 TABLET ORAL at 01:00

## 2020-09-15 RX ADMIN — SODIUM CHLORIDE, POTASSIUM CHLORIDE, SODIUM LACTATE AND CALCIUM CHLORIDE 1000 ML: 600; 310; 30; 20 INJECTION, SOLUTION INTRAVENOUS at 04:44

## 2020-09-15 RX ADMIN — AZELASTINE HYDROCHLORIDE 1 SPRAY: 137 SPRAY, METERED NASAL at 20:44

## 2020-09-15 RX ADMIN — MEROPENEM 1 G: 1 INJECTION, POWDER, FOR SOLUTION INTRAVENOUS at 05:52

## 2020-09-15 RX ADMIN — HYDROCODONE BITARTRATE AND ACETAMINOPHEN 2 TABLET: 5; 325 TABLET ORAL at 22:32

## 2020-09-15 RX ADMIN — METOPROLOL TARTRATE 5 MG: 1 INJECTION, SOLUTION INTRAVENOUS at 03:25

## 2020-09-15 ASSESSMENT — PAIN SCALES - GENERAL
PAINLEVEL_OUTOF10: 0
PAINLEVEL_OUTOF10: 8
PAINLEVEL_OUTOF10: 0
PAINLEVEL_OUTOF10: 8

## 2020-09-15 NOTE — PROGRESS NOTES
[place] INPATIENT ENCOUNTER   ORTHOPEDIC CONSULT NOTE    ADMISSION DATE:  5/3/2019  CONSULTING PHYSICIAN:  Fermin Farmer MD  ATTENDING PHYSICIAN:  Benson Tomas DO   CODE STATUS:  Full Resuscitation    CHIEF COMPLAINT:   Left shoulder and neck pain    SUBJECTIVE:   I was asked to see Sheree Hart for orthopedic consultation. Sheree Hart is a 74 year old female patient admitted with Acute pain of left shoulder [M25.512]  Fever, unspecified fever cause [R50.9].    Admitted with shoulder and neck pain no history of trauma.  Initially her troubles started about a month ago including a hospitalization for back pain that progressed to include neck pain.  She then reports a course of steroids as well as IV antibiotics which improved those pains . She then had recurrent neck and back pain she reports after going off IV antibiotics to orals.  This was about a week ago.  Her neck and back pain have now expanded to include her left shoulder and arm down to her elbow.  She feels that her hand is numb.  She has pain with any movement of the left arm.      PROBLEM LIST:    Patient Active Problem List   Diagnosis   • COPD (chronic obstructive pulmonary disease) (CMS/[facility])   • Hyperlipidemia   • HTN (hypertension)   • Type 2 diabetes mellitus with stage 3 chronic kidney disease, without long-term current use of insulin (CMS/[facility])   • Vitamin D deficiency   • Osteoarthritis of hip   • Failed total hip arthroplasty (CMS/[facility])   • GERD (gastroesophageal reflux disease)   • Abnormal stress test   • Coronary atherosclerosis of native coronary artery   • Status post revision of total hip replacement   • Hip dislocation, right (CMS/HCC)   • Hip instability   • Chronic renal insufficiency, stage III (moderate) (CMS/HCC)   • Idiopathic gout of foot   • Non-rheumatic mitral valve stenosis   • Coronary artery disease involving native coronary artery of native heart without angina pectoris   • Obesity, Class III, BMI 40-49.9 (morbid  Per ICU, RN unable to assist with IV placement. C4 RN attempted to place but unsuccessful. Clinical requested to bedside to attempt. obesity) (CMS/[facility])   • Chronic bronchitis (CMS/[facility])   • Chronic gouty arthritis   • History of gout   • Acute idiopathic gout of right foot   • Chronic bilateral low back pain without sciatica   • Spinal stenosis of lumbar region   • Sepsis (CMS/[facility])   • Acute bacterial meningitis   • Hypokalemia   • Septic arthritis (CMS/[facility])   • Shoulder pain   • Leukocytosis       MEDICATIONS:   Medications were reviewed.     HISTORIES:   ALLERGIES:   Allergen Reactions   • Latex   (Environmental) PRURITUS     Hands itch when wears latex gloves     Current Facility-Administered Medications   Medication Dose Route Frequency Provider Last Rate Last Dose   • acetaminophen (TYLENOL) tablet 650 mg  650 mg Oral Q6H Benson S Siehoff, DO   650 mg at 05/04/19 1314   • oxyCODONE (IMM REL) (ROXICODONE) tablet 5-10 mg  5-10 mg Oral Q4H PRN Benson S Siehoff, DO   Stopped at 05/04/19 1304   • lidocaine (LIDOCARE) 4 % patch 1 patch  1 patch Transdermal Daily Benson S Siehoff, DO   1 patch at 05/04/19 1303   • tiZANidine (ZANAFLEX) tablet 4 mg  4 mg Oral Q8H PRN Benson S Siehoff, DO       • sodium chloride (NORMAL SALINE) 0.9 % bolus 500 mL  500 mL Intravenous Once Benson S Siehoff, DO       • allopurinol (ZYLOPRIM) tablet 300 mg  300 mg Oral Daily Bart Orellana DO   300 mg at 05/04/19 1305   • aspirin (ECOTRIN) enteric coated tablet 81 mg  81 mg Oral Daily Bart Orellana DO   81 mg at 05/04/19 1305   • fluticasone-salmeterol (ADVAIR DISKUS) 250-50 MCG/DOSE inhaler 1 puff  1 puff Inhalation BID Resp Bart Orellana DO   1 puff at 05/04/19 1303   • pantoprazole (PROTONIX) EC tablet 40 mg  40 mg Oral Nightly Bart Orellana DO       • pregabalin (LYRICA) capsule 100 mg  100 mg Oral BID Bart Orellana DO   100 mg at 05/04/19 1305   • famotidine (PEPCID) tablet 20 mg  20 mg Oral Daily Bart Orellana DO   20 mg at 05/04/19 1314   • simvastatin (ZOCOR) tablet 40 mg  40 mg Oral QHS Bart Orellana DO       • sodium  chloride (PF) 0.9 % injection 2 mL  2 mL Injection 2 times per day Bart Orellana DO       • sodium chloride (PF) 0.9 % injection 2 mL  2 mL Injection PRN Bart Orellana DO       • sodium chloride 0.9 % flush bag 500 mL  500 mL Intravenous PRN Bart Orellana DO       • enoxaparin (LOVENOX) injection 30 mg  30 mg Subcutaneous Daily Bart Orellana DO   30 mg at 05/04/19 1303   • potassium CHLORIDE (KLOR-CON M) ricky ER tablet 20 mEq  20 mEq Oral Q4H PRN Bart Orellana DO   20 mEq at 05/04/19 0855   • potassium CHLORIDE (KLOR-CON) packet 20 mEq  20 mEq Per NG tube Q4H PRN Bart Orellana DO       • potassium CHLORIDE 20 mEq/100mL IVPB premix  20 mEq Intravenous Q4H PRN Bart Orellana DO       • potassium CHLORIDE (KLOR-CON M) ricky ER tablet 40 mEq  40 mEq Oral Q4H PRN Bart Orellana DO       • potassium CHLORIDE (KLOR-CON) packet 40 mEq  40 mEq Per NG tube Q4H PRN Bart Orellana DO       • potassium CHLORIDE 20 mEq/100mL IVPB premix  40 mEq Intravenous Q4H PRN Bart Orellana DO       • magnesium sulfate 1 g in dextrose 5% 100 mL IVPB premix  1 g Intravenous Daily PRN Bart Orellana  mL/hr at 05/04/19 0856 1 g at 05/04/19 0856   • magnesium sulfate 2 g in 50 mL premix IVPB  2 g Intravenous Daily PRN Bart Orellana DO       • magnesium sulfate 2 g in 50 mL premix IVPB  2 g Intravenous Q4H PRN Bart Orellana DO       • ondansetron (ZOFRAN) injection 4 mg  4 mg Intravenous BID PRN Bart Orellana DO       • VANCOMYCIN - PHARMACIST MONITORED   Does not apply See Admin Instructions Bart Orellana DO       • cefepime (MAXIPIME) 1,000 mg in sodium chloride 0.9 % 100 mL IVPB  1,000 mg Intravenous Daily Bart Orellana DO         Past Medical History:   Diagnosis Date   • CAD (coronary artery disease) 2/20/14    S/P stenting   • Cataract     watching progression of left cataract   • Chronic kidney disease     CKD III   • Chronic pain     back and  neck   • Constipation    • COPD (chronic obstructive pulmonary disease) (CMS/[facility])    • Dermatitis    • Dislocation of right hip (CMS/[facility]) 3/2015, 2015, 2015    3 times after hip revision   • DJD (degenerative joint disease)     shoulder and hips    • GERD (gastroesophageal reflux disease) 2013   • Gout    • Hyperlipidemia    • Impaired mobility and ADLs     uses rollator   • Myocardial infarction (CMS/[facility])     s/p stents   • Osteoporosis     r hip surgery x 3   • Pneumonia     hx   • Psoriasis    • Right hip pain     revision of right hip replacement planned for 14   • Shoulder pain, right     \"severe pain , can't move shoulder without pain\"   • SOB (shortness of breath) on exertion    • Type II or unspecified type diabetes mellitus without mention of complication, not stated as uncontrolled     diet control    • Urinary incontinence     after surgery incontinence   • Vitamin D deficiency    • Wears dentures     upper and lowers     Past Surgical History:   Procedure Laterality Date   • Cardiac catherization  2014    70% LAD,  90% mid RCA   • Cardiac catherization      R and L   • Cardiac catheterization/possible ptca/possible stent  2017    Dr Shanks- poss    • Carpal tunnel release      bilateral   •  section, classic     • Coronary angioplasty with stent placement  14    drug eluting stent RCA, mid/prox LAD   • Eye surgery  2007    retinal detachment surgery   • Eye surgery  2007    Right cataract extraction with IOL implant   • Hip surgery     • Joint replacement  2009    Right THR x 3   • Laminectomy,lumbar  ,    with fusions   • Removal gallbladder     • Removal of kidney stone     • Revision total hip arthroplasty  2014    due to recall   • Revision total hip arthroplasty Right 8/27/15   • Tonsillectomy and adenoidectomy       Social History     Tobacco Use   • Smoking status: Former Smoker     Packs/day: 1.00     Years: 45.00      Pack years: 45.00     Types: Cigarettes     Last attempt to quit:      Years since quittin.3   • Smokeless tobacco: Never Used   • Tobacco comment: quit . resmoked for 3 months in 2012 when daughter    Substance Use Topics   • Alcohol use: Not Currently     Alcohol/week: 1.2 oz     Types: 2 Standard drinks or equivalent per week     Comment: wine a couple times a week - 2 glasses   • Drug use: No     Family History   Problem Relation Age of Onset   • Heart disease Mother    • COPD Mother    • Heart disease Father 50        MI   • Diabetes Sister    • Stroke Sister    • Diabetes Daughter    • Heart disease Paternal Grandfather    • Cancer Neg Hx        REVIEW OF SYSTEMS:    Denies any new onset chest pain or shortness of breath.  See HPI.    OBJECTIVE:      VITAL SIGNS:    Vital Last Value 24 Hour Range   Temperature 97.9 °F (36.6 °C) (19 1305) Temp  Min: 97.9 °F (36.6 °C)  Max: 102.3 °F (39.1 °C)   Pulse 78 (19 1305) Pulse  Min: 78  Max: 97   Respiratory 16 (19 1305) Resp  Min: 16  Max: 25   Non-Invasive  Blood Pressure (!) 82/58(RN aware) (19 1305) BP  Min: 82/58  Max: 186/88   Pulse Oximetry 97 % (19 0930) SpO2  Min: 90 %  Max: 99 %     Vital Today Admitted   Weight 81.5 kg (19 2200) Weight: 83.1 kg (19 1539)   Height N/A Height: 5' 3\" (160 cm) (19 153)   BMI N/A BMI (Calculated): 32.45 (19 1539)     INTAKE/OUTPUT:    No intake or output data in the 24 hours ending 19 1347     PHYSICAL EXAM:    Constitutional:  Well-developed, well-nourished female in no acute distress.  Skin: Warm, dry, intact without rash or lesion.  Neurologic:  Alert, makes eye contact .  Musculoskeletal:   Evaluation of the Left lower extremity demonstrates a palpable 2+ radial pulse.  Sensation is intact to light touch in the median radial and ulnar nerve distribution. She has no pain with wrist passive motion.  She has  elbow motion with minimal pain but  beyond that is painful.  Glenohumeral rotation 0-20 degrees external rotation does not cause much pain.  Beyond that causes pain.    She has no erythema at the shoulder.      LABORATORY DATA:    Lab Results   Component Value Date    WBC 23.9 (H) 05/04/2019    RBC 3.37 (L) 05/04/2019    HCT 32.8 (L) 05/04/2019    HGB 10.2 (L) 05/04/2019     05/04/2019    SODIUM 139 05/04/2019    POTASSIUM 3.4 05/04/2019    CO2 25 05/04/2019    CHLORIDE 104 05/04/2019    BUN 16 05/04/2019    CREATININE 1.37 (H) 05/04/2019    GLUCOSE 121 (H) 05/04/2019    INR 1.3 05/04/2019    PTT 37 (H) 04/14/2019       IMAGING STUDIES:    xr left shoulder 3 v 5/3  IMPRESSION:  No acute osseous abnormality.      Acromioclavicular and glenohumeral OA.    MRI c spine 4/16  IMPRESSION:  1.  Examination is motion degraded which limits evaluation.  2.  Multilevel cervical spondylosis as detailed above. Overall degree of  narrowing is difficult to assess secondary to motion artifact. Canal  narrowing is greatest up to moderate at C5-C6 secondary to posterior disc  osteophyte complex. Neural foraminal narrowing is most pronounced at C6-C7  where narrowing is moderate to severe.  3.  Partially visualized susceptibility within the cerebellum suspicious  for remote chronic microhemorrhages.    Left shoulder aspirate last night in ED reportedly from lateral approach  Volume 5 cc  Gram stain no orgs, culture pending  Cell count 13,000  No crystals      ASSESSMENT:    1.  Neck and left shoulder pain, atraumatic     PLAN:    1.  Neck vs shoulder etiology.  Will get an MRI left shoulder to evaluate for effusion or structural pathology.  Cell count is not particularly impressive.  No organisms though has been on antibiotics.  Will follow culture.  Spine opinion may be worthwhile pending shoulder MRI.    Sheree AN Solitariomsjenn is to be medically cleared by Benson Tomas DO.      CASE DISCUSSED WITH: Patient and Family

## 2020-09-15 NOTE — CONSULTS
Pharmacy to Dose Warfarin    Dx: Afib   Goal INR range 2-3   Home Warfarin dose: 10mg daily except 12.5mg on Wed    Date  INR  Warfarin  9/12                2.07                 10 mg  9/13                1.83                  Held /NPO  9/14                1.74                  Held /NPO    9/15                1.54                  12.5 mg (Rx consulted)      Recommend Warfarin 12.5 mg tonight x1. Daily INR ordered. Rx will continue to manage therapy per Dr. Leelee Washington consult order.   Yanet Almonte/Armando. 9/15/20 5:12 PM EDT

## 2020-09-15 NOTE — PROGRESS NOTES
ER RN attempted IV placement without use of US. IV immediately infiltrated. Paged cross cover NP for possible EJ placement. Will defer to clinical as well.

## 2020-09-15 NOTE — PROGRESS NOTES
Pt continues to be borderline hypotensive, with pressures 90s/60s to 100s/60s despite 500 mL bolus and restarting of IVF for LR and NS. HR maintaining in the 110s and pt asymptomatic; denies palpitations. Given this, I have continued to defer initiating the diltiazem gtt. Will continue to trend pt's VS and will inform oncoming shift.

## 2020-09-15 NOTE — PROGRESS NOTES
Physical Therapy  Facility/Department: [facility] B3 - MED SURG  Daily Treatment Note  NAME: Rosa Garcia  : 1955  MRN: [id number]    Date of Service: 9/15/2020    Discharge Recommendations:  Home with assist PRN, Home with Home health PT   PT Equipment Recommendations  Equipment Needed: No  If pt discharges prior to next PT session this note will serve as discharge summary. Assessment   Assessment: Today pt participated in BLE Ex and deep breathing ex to prevent complications of bedrest. He performed Bed mob with SBA and cues for technique. He was able to stand at bedside with CG. Pt declined ambulation. Vital signs stable during session. Anticipate pt will be able to return home with assist prn and home PT as he is moving much better today than yesterday. Pt will benefit from skilled PT to address current deficits. Treatment Diagnosis: decreased mobility  Specific instructions for Next Treatment: ex, advance mobility  Prognosis: Good  PT Education: Goals; General Safety;Gait Training;Disease Specific Education;PT Role;Plan of Care  Patient Education: Prevention of complications of bedrest, purpose of PT intervention- pt voices understanding  Barriers to Learning: none  REQUIRES PT FOLLOW UP: Yes  Activity Tolerance  Activity Tolerance: Patient Tolerated treatment well  Activity Tolerance: HR 79, SpO2 98% sitting EOB     Patient Diagnosis(es): The primary encounter diagnosis was Acute appendicitis with localized peritonitis, without perforation, abscess, or gangrene. Diagnoses of Right lower quadrant abdominal pain, Chronic atrial fibrillation, and Chronic anticoagulation were also pertinent to this visit. has a past medical history of Asthma, Atrial fibrillation ([address] Diabetes mellitus ([address] Hyperlipidemia, Hypertension, and Thyroid disease. has a past surgical history that includes Cardiac catheterization (Left, 2015);  Cardiac defibrillator placement (2016); ablation of dysrhythmic focus ( AND 2009); and laparoscopic appendectomy (N/A, 9/11/2020). Restrictions  Restrictions/Precautions: General Precautions, Fall Risk  Other position/activity restrictions: NG to suction, DESTIN, IV, telemetry  Subjective   Chart Reviewed: Yes  Response To Previous Treatment: Patient with no complaints from previous session. Family / Caregiver Present: No  Referring Practitioner: Jon Sarmiento MD  Subjective  Subjective: Pt agrees to therapy with encouragement and explanation of reasons for therapeutic intervention, states he sat EOB earlier today. Wants to get up to a chair later- chair in room too low and narrow for him  Comments: RN cleared pt for therapy, pt in bed on approach. PT informed RN pt needs chair that will fit his height and weight, as well as bed extender. She agrees to getting these.   Pain Screening  Patient Currently in Pain: Denies       Orientation  Overall Orientation Status: Within Normal Limits     Objective   Bed mobility  Supine to Sit: Stand by assistance  Sit to Supine: Stand by assistance  Comment: cues for hand placement and log roll technique for ease of bed mob  Transfers  Sit to Stand: Contact guard assistance(bed slightly elevated to accomodate pt's height)  Ambulation  Ambulation?: No(pt declined, still has NG connected to wall, pt states he walks minimally at home, uses w/c for primary locomotion)   Exercises  Quad Sets: 10 x B  Gluteal Sets: 10 x B  Ankle Pumps: 15 x B   Diaphragmatic breathing 10 x      AM-PAC Score  AM-PAC Inpatient Mobility Raw Score : 14 (09/15/20 1003)  AM-PAC Inpatient T-Scale Score : 38.1 (09/15/20 1003)  Mobility Inpatient CMS 0-100% Score: 61.29 (09/15/20 1003)  Mobility Inpatient CMS G-Code Modifier : CL (09/15/20 1003)     Goals  Short term goals  Time Frame for Short term goals: 9/20  Short term goal 1: Pt will complete bed mobility with SBA- MET 9/15  Short term goal 2: Pt will ambulate x 20' with RW with CGA- 9/15 pt declined amb  Short term goal 3: Pt will copmlete B LE to improve functional mobility by 9/16- 9/15 exercises initiated  Patient Goals   Patient goals : to go home    Plan    Times per week: 3-5x/week  Specific instructions for Next Treatment: ex, advance mobility  Current Treatment Recommendations: Strengthening, Balance Training, Endurance Training, Functional Mobility Training, Home Exercise Program, Positioning, Modalities  Safety Devices  Type of devices:  All fall risk precautions in place, Bed alarm in place, Call light within reach, Gait belt, Patient at risk for falls, Left in bed, Nurse notified  Restraints  Initially in place: No     Therapy Time   Individual Concurrent Group Co-treatment   Time In 0945         Time Out 1010         Minutes [id number] [facility], PT

## 2020-09-15 NOTE — PROGRESS NOTES
Consult called to cardiology on 9/15/2020 @ [id number]. Rn Kamari.  [facility]

## 2020-09-15 NOTE — CONSULTS
[address] [facility]  [phone]      Attending Physician: West Ramires*  Reason for Consultation/Chief Complaint: Abdominal pain    Subjective   History of Present Illness:  Chai Sarmiento is a 60 y.o. patient who presented to the hospital with complaints of abdominal pain localized to the right lower quadrant on September 11, 2020. He says that he had been having ongoing pain for several days and he presented to the emergency room on September 11, 2020 work-up revealed acute appendicitis. He underwent urgent laparoscopic appendectomy with Dr. Jimmie Friend on September 11, 2020 due to acute appendicitis. He states he has not been able to eat since then but has had ice chips today and has been able to take some pills by mouth. He denies any chest pain or shortness of breath or dizziness or loss of consciousness. Says he feels his heart rate might be going a little faster but it is not bothering him. To the course of his admission, he has been noted to have atrial fibrillation with rapid ventricular response. He has been treated with metoprolol IV and digoxin as well as amiodarone. Blood pressures have been running low and a Cardizem drip was ordered but not able to be fully given due to hypotension. Patient has a longstanding history of atrial fibrillation as well as nonischemic cardiomyopathy, he gets his usual cardiac care through the [facility] system. He sees Dr. Jennifer Scott there for nonischemic cardiomyopathy as well as Dr. Marychuy Harley from  for atrial fibrillation. He has had a history of at least 2 ablation procedures done at the [facility] around 2009 with Dr. Cristian Haywood. He has had permanent atrial fibrillation due to recurrence of arrhythmia. A strategy of rate control and anticoagulation was pursued as patient did not want to pursue further rhythm control strategies.   Ultimately, in 2016 he had a [employer] single-chamber ICD implanted for primary prevention due to history of nonischemic cardiomyopathy. His last office visit at [facility] was in February 2020, at that time, he was felt to be stable with regard to chronic medical conditions including hypertension, chronic systolic heart failure and nonischemic cardiomyopathy. It was noted he has a chronic coagulopathy related to Coumadin use for atrial fibrillation. His weight at that time was 409 pounds. Current weight is 434 pounds. Weight however does not appear to have been reassessed since admission when it was initially found to be 4 and 20 pounds followed by 434 pounds. Past Medical History:   has a past medical history of Asthma, Atrial fibrillation ([address] Diabetes mellitus ([address] Hyperlipidemia, Hypertension, and Thyroid disease. Surgical History:   has a past surgical history that includes Cardiac catheterization (Left, 2015); Cardiac defibrillator placement (2016); ablation of dysrhythmic focus (2009 AND 2009); and laparoscopic appendectomy (N/A, 9/11/2020). Social History:   reports that he has never smoked. He quit smokeless tobacco use about 32 years ago. His smokeless tobacco use included snuff. He reports that he does not drink alcohol or use drugs. Family History:  family history includes Cancer in his father and mother. Home Medications:  Were reviewed and are listed in nursing record and/or below  Prior to Admission medications    Medication Sig Start Date End Date Taking? Authorizing Provider   ALLOPURINOL PO Take 100 mg by mouth daily    Yes Historical Provider, MD   testosterone (ANDROGEL; TESTIM) 50 MG/5GM (1%) GEL 1% gel Apply topically daily.    Yes Historical Provider, MD   spironolactone (ALDACTONE) 25 MG tablet Take 25 mg by mouth daily   Yes Historical Provider, MD   atorvastatin (LIPITOR) 10 MG tablet Take 10 mg by mouth daily    Yes Historical Provider, MD   clonazePAM (KLONOPIN) 1 MG tablet Take one-half tablet by mouth twice a day as needed for anxiety   Yes Historical Provider, MD   digoxin (LANOXIN) 250 MCG tablet Take 125 mcg by mouth daily  8/21/17  Yes Historical Provider, MD   gabapentin (NEURONTIN) 300 MG capsule Take 300 mg by mouth 3 times daily.     Yes Historical Provider, MD   Levothyroxine Sodium 112 MCG CAPS Take 112 mcg by mouth daily    Yes Historical Provider, MD   lisinopril (PRINIVIL;ZESTRIL) 40 MG tablet Take 30 mg by mouth daily  3/8/17  Yes Historical Provider, MD   loratadine (CLARITIN) 10 MG tablet Take 10 mg by mouth daily    Yes Historical Provider, MD   metoprolol succinate (TOPROL XL) 50 MG extended release tablet Take 200 mg by mouth daily  8/28/17  Yes Historical Provider, MD   montelukast (SINGULAIR) 10 MG tablet Take 10 mg by mouth nightly    Yes Historical Provider, MD   torsemide (DEMADEX) 20 MG tablet Take 20 mg by mouth daily  4/13/16  Yes Historical Provider, MD   vardenafil (LEVITRA) 20 MG tablet Take 1/2 tablet by mouth one hour before sexual activity   Yes Historical Provider, MD   warfarin (COUMADIN) 5 MG tablet 5 mg Indications: 2 5mg tablet every day but wednesday and on wednesday take 12.5 mg    Yes Historical Provider, MD   azelastine (ASTELIN) 0.1 % nasal spray 1 spray by Nasal route 2 times daily Use in each nostril as directed   Yes Historical Provider, MD        CURRENT Medications:  amiodarone (CORDARONE) tablet 200 mg, Daily  digoxin (LANOXIN) injection 250 mcg, Once  dilTIAZem 125 mg in dextrose 5 % 125 mL infusion, Continuous  metoprolol (LOPRESSOR) injection 5 mg, Q6H  docusate sodium (COLACE) capsule 100 mg, Daily  prochlorperazine (COMPAZINE) injection 10 mg, Q6H PRN  naloxone (NARCAN) injection 0.4 mg, PRN  insulin lispro (HUMALOG) injection vial 0-6 Units, TID WC  insulin lispro (HUMALOG) injection vial 0-3 Units, Nightly  glucose (GLUTOSE) 40 % oral gel 15 g, PRN  dextrose 50 % IV solution, PRN  glucagon (rDNA) injection 1 mg, PRN  dextrose 5 % solution, PRN  HYDROcodone-acetaminophen (Rosezena Reap) 5-325 MG per tablet 1 tablet, Q4H PRN    Or  HYDROcodone-acetaminophen (NORCO) 5-325 MG per tablet 2 tablet, Q4H PRN  warfarin (COUMADIN) tablet 10 mg, Daily  morphine (PF) injection 4 mg, Q30 Min PRN  ondansetron (ZOFRAN) injection 4 mg, Q30 Min PRN  lactated ringers infusion 1,000 mL, Continuous  meropenem (MERREM) 1 g in sodium chloride 0.9 % 100 mL IVPB, Q8H  allopurinol (ZYLOPRIM) tablet 100 mg, Daily  atorvastatin (LIPITOR) tablet 10 mg, Daily  azelastine (ASTELIN) 0.1 % nasal spray 1 spray, BID  clonazePAM (KLONOPIN) tablet 0.5 mg, BID PRN  digoxin (LANOXIN) tablet 125 mcg, Daily  gabapentin (NEURONTIN) capsule 300 mg, TID  levothyroxine (SYNTHROID) tablet 112 mcg, Daily  cetirizine (ZYRTEC) tablet 10 mg, Daily  [Held by provider] metoprolol succinate (TOPROL XL) extended release tablet 200 mg, Daily  montelukast (SINGULAIR) tablet 10 mg, Nightly  spironolactone (ALDACTONE) tablet 25 mg, Daily  torsemide (DEMADEX) tablet 20 mg, Daily  sodium chloride flush 0.9 % injection 10 mL, 2 times per day  sodium chloride flush 0.9 % injection 10 mL, PRN  promethazine (PHENERGAN) tablet 12.5 mg, Q6H PRN    Or  ondansetron (ZOFRAN) injection 4 mg, Q6H PRN  0.9 % sodium chloride infusion, Continuous  HYDROmorphone (DILAUDID) injection 1 mg, Q2H PRN  acetaminophen (TYLENOL) tablet 650 mg, Q4H PRN  famotidine (PEPCID) injection 20 mg, BID    lidocaine (LMX) 4 % cream, Once        Allergies:  Pravastatin     Review of Systems:   A 14 point review of symptoms completed. Pertinent positives identified in the HPI, all other review of symptoms negative as below.       Objective   PHYSICAL EXAM:    Vitals:    09/15/20 1143   BP: 104/64   Pulse: 113   Resp: 22   Temp: 97.9 °F (36.6 °C)   SpO2: 94%    Weight: (!) 434 lb 1.4 oz (196.9 kg)         General Appearance:  Alert, cooperative, no distress, appears stated age, lying flat   Head:  Normocephalic, without obvious abnormality, atraumatic   Eyes:  PERRL, conjunctiva/corneas clear Nose:  NG in place   Throat: Lips, mucosa, and tongue dry   Neck: Supple, symmetrical, trachea midline, no adenopathy, thyroid: not enlarged, symmetric, no tenderness/mass/nodules, no carotid bruit or JVD   Lungs:   Clear to auscultation bilaterally, respirations unlabored   Chest Wall:  No deformity or tenderness   Heart:   Irregular rate and rhythm, S1, S2 normal, tachycardic, distant heart sounds   Abdomen:    Distended   Extremities:  Bilateral 1+ lower extremity edema   Pulses: 2+ and symmetric   Skin: Skin color, texture, turgor normal, no rashes or lesions   Pysch: Normal mood and affect   Neurologic: Normal gross motor and sensory exam.         Labs   CBC:   Lab Results   Component Value Date    WBC 6.9 09/15/2020    RBC 3.99 09/15/2020    HGB 12.0 09/15/2020    HCT 36.1 09/15/2020    MCV 90.5 09/15/2020    RDW 14.8 09/15/2020     09/15/2020     CMP:  Lab Results   Component Value Date     09/15/2020    K 4.2 09/15/2020     09/15/2020    CO2 26 09/15/2020    BUN 26 09/15/2020    CREATININE 0.8 09/15/2020    GFRAA >60 09/15/2020    AGRATIO 1.2 09/11/2020    LABGLOM >60 09/15/2020    GLUCOSE 109 09/15/2020    PROT 7.6 09/11/2020    CALCIUM 9.3 09/15/2020    BILITOT 1.2 09/11/2020    ALKPHOS 75 09/11/2020    AST 31 09/11/2020    ALT 29 09/11/2020     PT/INR:  No results found for: PTINR  HgBA1c:  Lab Results   Component Value Date    LABA1C 7.1 09/14/2020     Lab Results   Component Value Date    TROPONINI <0.01 09/13/2020         Cardiac Data     Last EKG:    Atrial fibrillation, rapid ventricular response, borderline IVCD with nonspecific ST/T changes, similar to prior EKG from February 2020    Echo:    2018 at [facility] C[facility]:    Study Conclusions    - Left ventricle: The cavity size was dilated. There was mild    concentric hypertrophy.  Systolic function was moderately reduced.    The estimated ejection fraction was in the range of 30% to 35%.    Wall motion was normal; there were no regional wall motion    abnormalities. Diastolic dysfunction; unable to assess left    atrial pressure. - Aortic valve: Thickening, consistent with sclerosis. - Mitral valve: The annulus was mildly calcified. - Left atrium: The atrium was mildly dilated. - Right ventricle: The cavity size was mildly dilated. Wall    thickness was normal. Pacer wire noted in right ventricle. - Systemic veins: Poorly visualized. - Inferior vena cava: The vessel was normal in size; the    respirophasic diameter changes were in the normal range (>= 50%);    findings are consistent with normal central venous pressure. Stress Test:    Cath:    Studies:     cxr       Impression    Limited evaluation of the right PICC, however what is seen is suspected to    terminate over the mid to distal SVC. I have reviewed labs and imaging/xray/diagnostic testing in this note. Assessment and Plan          Patient Active Problem List   Diagnosis    Primary osteoarthritis of both knees    Lymphedema    Other specified peripheral vascular diseases ([address]    Non-pressure chronic ulcer of left calf with fat layer exposed ([address]    Morbid obesity ([address]    Peripheral venous insufficiency    Acute appendicitis with localized peritonitis, without perforation, abscess, or gangrene    Perforated appendicitis    Atrial fibrillation ([address]    Hyperlipidemia    Diabetes mellitus ([address]    Hypertension       Atrial fibrillation with rapid ventricular response, this is likely a physiological response to patient's recovery from appendicitis, will add IV dig one-time dosing as well as Cardizem drip with parameters to hold if blood pressure continues to remain low. In order to facilitate these treatments, will discontinue lisinopril. For anticoagulation, it appears that Coumadin has already been reordered. Will obtain dig level.     Nonischemic cardio myopathy, will resume home medicines closer to discharge and when patient is able to take p.o. medications more fully. Obtain echocardiogram.    Hypertension, now with borderline blood pressures, treatment as above    Chronic systolic heart failure, appears to be compensated, will order daily weights, continue diuretics            Thank you for allowing us to participate in the care of Jarocho Austin. Please call me with any questions [phone].     Bob Wilson MD, [facility]   Interventional Cardiologist  [facility]  [phone] [facility]  [phone] [address] [place] [address]  9/15/2020 1:31 PM

## 2020-09-15 NOTE — PROGRESS NOTES
Upon arrival to place PICC line assessed chart for issues related to picc placement, check for consent, and did time out with ARMANDO Bustos. Pt. Tolerated PICC placement well, no difficulty accessing basilic vein and Xray technology used to verify PICC tip placement. Upon chest xray PICC tip mid to distal SVC. Printed wave form, vessel image and placed In chart.  Reported off to ARMANDO Bustos

## 2020-09-15 NOTE — PROGRESS NOTES
[facility] GENERAL SURGERY    Surgery Progress Note           POD # 4    PATIENT NAME: Chai Sarmiento     TODAY'S DATE: 9/15/2020    INTERVAL HISTORY:    Pt feeling better - reports he is now passing flatus and his abd is soft, no further distention. OBJECTIVE:   VITALS:  /71   Pulse 130   Temp 97.8 °F (36.6 °C) (Oral)   Resp 22   Ht 6' 5\" (1.956 m)   Wt (!) 434 lb 1.4 oz (196.9 kg)   SpO2 94%   BMI 51.48 kg/m²     INTAKE/OUTPUT:    I/O last 3 completed shifts: In: 700 [I.V.:600; IV Piggyback:100]  Out: 4681 [Urine:1075; Emesis/NG output:2450; Drains:420]  I/O this shift: In: 0   Out: 1120 [Urine:250; Emesis/NG output:800; Drains:70]              CONSTITUTIONAL:  awake and alert  ABDOMEN:   hypoactive bowel sounds, soft, obese, minimally distended, appropriately tender, gisel serosanguinous  INCISION: no drainage    Data:  CBC:   Recent Labs     09/13/20  0557 09/14/20  0957 09/15/20  0601   WBC 9.0 8.3 6.9   HGB 12.7* 12.8* 12.0*   HCT 38.8* 39.0* 36.1*    195 175     BMP:    Recent Labs     09/13/20  0557 09/14/20  0600 09/15/20  0601    139 139   K 4.4 4.8 4.2    99 101   CO2 27 28 26   BUN 20 26* 26*   CREATININE 0.8 0.9 0.8   GLUCOSE 117* 136* 109*     Hepatic:   No results for input(s): AST, ALT, ALB, BILITOT, ALKPHOS in the last 72 hours. Mag:    No results for input(s): MG in the last 72 hours. Phos:   No results for input(s): PHOS in the last 72 hours. INR:   Recent Labs     09/13/20  0557 09/14/20  1217 09/15/20  0601   INR 1.83* 1.74* 1.54*     EXAMINATION:   TWO XRAY VIEWS OF THE ABDOMEN     9/15/2020 7:51 am     COMPARISON:   09/14/2020     HISTORY:   ORDERING SYSTEM PROVIDED HISTORY: ileus   TECHNOLOGIST PROVIDED HISTORY:   Reason for exam:->ileus   Reason for Exam: ILEUS   Acuity: Acute   Type of Exam: Initial     FINDINGS:   Study is again limited by body habitus. Tip of NG tube projects in region of stomach.      Scattered gaseous distention is seen

## 2020-09-15 NOTE — PROGRESS NOTES
acute distress. Alert. Head: Normocephalic, atraumatic  Eyes: EOMI, no scleral icterus  CVS: irreg irreg, Normal S1S2  Pulm: Clear to auscultation bilaterally. No crackles/wheezes  Gastrointestinal: Soft, nontender, obese, no guarding or rebound, lap incisions with overlying dressing  Extremities: no edema. No erythema or warmth  Neuro: No gross focal deficits noted  Skin: Warm, dry    Labs:   Recent Labs     09/13/20  0557 09/14/20  0957 09/15/20  0601   WBC 9.0 8.3 6.9   HGB 12.7* 12.8* 12.0*   HCT 38.8* 39.0* 36.1*    195 175     Recent Labs     09/13/20  0557 09/14/20  0600 09/15/20  0601    139 139   K 4.4 4.8 4.2    99 101   CO2 27 28 26   BUN 20 26* 26*   CREATININE 0.8 0.9 0.8   CALCIUM 9.5 9.7 9.3     No results for input(s): AST, ALT, BILIDIR, BILITOT, ALKPHOS in the last 72 hours. Recent Labs     09/13/20  0557 09/14/20  1217 09/15/20  0601   INR 1.83* 1.74* 1.54*     Recent Labs     09/13/20  1949   TROPONINI <0.01       Assessment/Plan:    Active Hospital Problems    Diagnosis Date Noted    Atrial fibrillation ([facility] [I48.91]     Hyperlipidemia [E78.5]     Diabetes mellitus ([facility] [E11.9]     Hypertension [I10]     Perforated appendicitis [K35.32] 09/11/2020    Morbid obesity ([facility] [E66.01] 03/05/2020     Acute perforated appendicitis s/p appendectomy  -post op management per general surgery  -on meropenem  -prn pain management    Acute episode of hypoxia, nausea, chest pain consistent with acute post op hypoxic respiratory failure. Transient and now resolved. Suspect 2/2 IV opiates  -negative troponin  -tele monitoring  -minimize opiates if possible    Acute on chronic Atrial fibrillation.   Now with RVR with overlying hypotension  - start amiodarone  - c/w po digoxin  - resume metoprolol if BP tolerates  - home AC with coumadin  - ECHO  - closely monitor and replace lytes  - cardio following     Morbid obesity  -With Body mass index is 82.7 kg/m². Complicating assessment and treatment. Placing patient at risk for multiple co-morbidities as well as early death and contributing to the patient's presentation. Counseled on weight loss     DM2 with peripheral neuropathy, controlled.   Hgb a1c 7.1  -ldssi  -poct ac/hs  -hypoglycemia protocol  -carb control diet when eating, currently NPO  -continue gabapentin     PMH of Essential HTN, hld  -continue home regimen    Diet: Diet NPO Effective Now Exceptions are: Sips with Meds, Ice Chips  Code Status: Full Code    Dispo - Harish Camargo MD

## 2020-09-16 LAB
ANION GAP SERPL CALCULATED.3IONS-SCNC: 11 MMOL/L (ref 3–16)
BUN BLDV-MCNC: 24 MG/DL (ref 7–20)
CALCIUM SERPL-MCNC: 9 MG/DL (ref 8.3–10.6)
CHLORIDE BLD-SCNC: 99 MMOL/L (ref 99–110)
CO2: 27 MMOL/L (ref 21–32)
CREAT SERPL-MCNC: 0.7 MG/DL (ref 0.8–1.3)
DIGOXIN LEVEL: 0.4 NG/ML (ref 0.8–2)
GFR AFRICAN AMERICAN: >60
GFR NON-AFRICAN AMERICAN: >60
GLUCOSE BLD-MCNC: 102 MG/DL (ref 70–99)
GLUCOSE BLD-MCNC: 114 MG/DL (ref 70–99)
GLUCOSE BLD-MCNC: 92 MG/DL (ref 70–99)
GLUCOSE BLD-MCNC: 94 MG/DL (ref 70–99)
GLUCOSE BLD-MCNC: 99 MG/DL (ref 70–99)
GLUCOSE BLD-MCNC: 99 MG/DL (ref 70–99)
HCT VFR BLD CALC: 34.5 % (ref 40.5–52.5)
HEMOGLOBIN: 11.7 G/DL (ref 13.5–17.5)
INR BLD: 1.42 (ref 0.86–1.14)
MCH RBC QN AUTO: 30.6 PG (ref 26–34)
MCHC RBC AUTO-ENTMCNC: 33.8 G/DL (ref 31–36)
MCV RBC AUTO: 90.7 FL (ref 80–100)
PDW BLD-RTO: 14.5 % (ref 12.4–15.4)
PERFORMED ON: ABNORMAL
PERFORMED ON: NORMAL
PLATELET # BLD: 165 K/UL (ref 135–450)
PMV BLD AUTO: 8.6 FL (ref 5–10.5)
POTASSIUM SERPL-SCNC: 4 MMOL/L (ref 3.5–5.1)
PROTHROMBIN TIME: 16.5 SEC (ref 10–13.2)
RBC # BLD: 3.8 M/UL (ref 4.2–5.9)
SODIUM BLD-SCNC: 137 MMOL/L (ref 136–145)
WBC # BLD: 7.5 K/UL (ref 4–11)

## 2020-09-16 PROCEDURE — 99024 POSTOP FOLLOW-UP VISIT: CPT | Performed by: SURGERY

## 2020-09-16 PROCEDURE — 6370000000 HC RX 637 (ALT 250 FOR IP): Performed by: INTERNAL MEDICINE

## 2020-09-16 PROCEDURE — 85610 PROTHROMBIN TIME: CPT

## 2020-09-16 PROCEDURE — 1200000000 HC SEMI PRIVATE

## 2020-09-16 PROCEDURE — 2580000003 HC RX 258: Performed by: INTERNAL MEDICINE

## 2020-09-16 PROCEDURE — 99232 SBSQ HOSP IP/OBS MODERATE 35: CPT | Performed by: INTERNAL MEDICINE

## 2020-09-16 PROCEDURE — 6360000002 HC RX W HCPCS: Performed by: INTERNAL MEDICINE

## 2020-09-16 PROCEDURE — 2500000003 HC RX 250 WO HCPCS: Performed by: SURGERY

## 2020-09-16 PROCEDURE — 2580000003 HC RX 258: Performed by: SURGERY

## 2020-09-16 PROCEDURE — 6360000002 HC RX W HCPCS: Performed by: SURGERY

## 2020-09-16 PROCEDURE — 6370000000 HC RX 637 (ALT 250 FOR IP): Performed by: SURGERY

## 2020-09-16 PROCEDURE — 80048 BASIC METABOLIC PNL TOTAL CA: CPT

## 2020-09-16 PROCEDURE — APPSS45 APP SPLIT SHARED TIME 31-45 MINUTES: Performed by: CLINICAL NURSE SPECIALIST

## 2020-09-16 PROCEDURE — 2500000003 HC RX 250 WO HCPCS: Performed by: INTERNAL MEDICINE

## 2020-09-16 PROCEDURE — 80162 ASSAY OF DIGOXIN TOTAL: CPT

## 2020-09-16 PROCEDURE — 85027 COMPLETE CBC AUTOMATED: CPT

## 2020-09-16 RX ORDER — DILTIAZEM HYDROCHLORIDE 60 MG/1
30 TABLET, FILM COATED ORAL EVERY 6 HOURS SCHEDULED
Status: DISCONTINUED | OUTPATIENT
Start: 2020-09-16 | End: 2020-09-17

## 2020-09-16 RX ADMIN — ONDANSETRON 4 MG: 2 INJECTION INTRAMUSCULAR; INTRAVENOUS at 19:50

## 2020-09-16 RX ADMIN — METOPROLOL TARTRATE 5 MG: 1 INJECTION, SOLUTION INTRAVENOUS at 20:56

## 2020-09-16 RX ADMIN — DILTIAZEM HYDROCHLORIDE 30 MG: 60 TABLET, FILM COATED ORAL at 23:16

## 2020-09-16 RX ADMIN — METOPROLOL TARTRATE 5 MG: 1 INJECTION, SOLUTION INTRAVENOUS at 03:02

## 2020-09-16 RX ADMIN — FAMOTIDINE 20 MG: 10 INJECTION INTRAVENOUS at 20:56

## 2020-09-16 RX ADMIN — MEROPENEM 1 G: 1 INJECTION, POWDER, FOR SOLUTION INTRAVENOUS at 23:16

## 2020-09-16 RX ADMIN — HYDROMORPHONE HYDROCHLORIDE 1 MG: 2 INJECTION, SOLUTION INTRAMUSCULAR; INTRAVENOUS; SUBCUTANEOUS at 06:00

## 2020-09-16 RX ADMIN — LEVOTHYROXINE SODIUM 112 MCG: 0.11 TABLET ORAL at 08:20

## 2020-09-16 RX ADMIN — Medication 10 ML: at 20:57

## 2020-09-16 RX ADMIN — HYDROMORPHONE HYDROCHLORIDE 1 MG: 2 INJECTION, SOLUTION INTRAMUSCULAR; INTRAVENOUS; SUBCUTANEOUS at 04:11

## 2020-09-16 RX ADMIN — CETIRIZINE HYDROCHLORIDE 10 MG: 10 TABLET, FILM COATED ORAL at 08:20

## 2020-09-16 RX ADMIN — ATORVASTATIN CALCIUM 10 MG: 10 TABLET, FILM COATED ORAL at 20:56

## 2020-09-16 RX ADMIN — MEROPENEM 1 G: 1 INJECTION, POWDER, FOR SOLUTION INTRAVENOUS at 14:47

## 2020-09-16 RX ADMIN — AMIODARONE HYDROCHLORIDE 200 MG: 200 TABLET ORAL at 08:20

## 2020-09-16 RX ADMIN — ALLOPURINOL 100 MG: 100 TABLET ORAL at 08:19

## 2020-09-16 RX ADMIN — SPIRONOLACTONE 25 MG: 25 TABLET ORAL at 08:22

## 2020-09-16 RX ADMIN — WARFARIN SODIUM 12.5 MG: 2.5 TABLET ORAL at 18:17

## 2020-09-16 RX ADMIN — MEROPENEM 1 G: 1 INJECTION, POWDER, FOR SOLUTION INTRAVENOUS at 05:07

## 2020-09-16 RX ADMIN — TORSEMIDE 20 MG: 20 TABLET ORAL at 08:17

## 2020-09-16 RX ADMIN — DOCUSATE SODIUM 100 MG: 100 CAPSULE, LIQUID FILLED ORAL at 08:19

## 2020-09-16 RX ADMIN — Medication 10 ML: at 08:21

## 2020-09-16 RX ADMIN — HYDROCODONE BITARTRATE AND ACETAMINOPHEN 2 TABLET: 5; 325 TABLET ORAL at 07:18

## 2020-09-16 RX ADMIN — FAMOTIDINE 20 MG: 10 INJECTION INTRAVENOUS at 08:20

## 2020-09-16 RX ADMIN — MONTELUKAST SODIUM 10 MG: 10 TABLET, FILM COATED ORAL at 20:56

## 2020-09-16 RX ADMIN — HYDROMORPHONE HYDROCHLORIDE 1 MG: 2 INJECTION, SOLUTION INTRAMUSCULAR; INTRAVENOUS; SUBCUTANEOUS at 01:16

## 2020-09-16 RX ADMIN — SODIUM CHLORIDE: 9 INJECTION, SOLUTION INTRAVENOUS at 14:46

## 2020-09-16 RX ADMIN — PROMETHAZINE HYDROCHLORIDE 12.5 MG: 25 TABLET ORAL at 06:36

## 2020-09-16 RX ADMIN — HYDROCODONE BITARTRATE AND ACETAMINOPHEN 2 TABLET: 5; 325 TABLET ORAL at 03:18

## 2020-09-16 RX ADMIN — DILTIAZEM HYDROCHLORIDE 30 MG: 60 TABLET, FILM COATED ORAL at 18:17

## 2020-09-16 RX ADMIN — DIGOXIN 125 MCG: 125 TABLET ORAL at 08:19

## 2020-09-16 RX ADMIN — DILTIAZEM HYDROCHLORIDE 30 MG: 60 TABLET, FILM COATED ORAL at 11:14

## 2020-09-16 RX ADMIN — GABAPENTIN 300 MG: 300 CAPSULE ORAL at 20:56

## 2020-09-16 ASSESSMENT — PAIN SCALES - GENERAL
PAINLEVEL_OUTOF10: 8
PAINLEVEL_OUTOF10: 8
PAINLEVEL_OUTOF10: 5
PAINLEVEL_OUTOF10: 8
PAINLEVEL_OUTOF10: 8
PAINLEVEL_OUTOF10: 0
PAINLEVEL_OUTOF10: 8
PAINLEVEL_OUTOF10: 8

## 2020-09-16 ASSESSMENT — PAIN DESCRIPTION - LOCATION: LOCATION: ABDOMEN

## 2020-09-16 ASSESSMENT — PAIN DESCRIPTION - PAIN TYPE: TYPE: ACUTE PAIN

## 2020-09-16 NOTE — PROGRESS NOTES
[facility]   Daily Cardiovascular Progress Note    Admit Date: 9/11/2020    Chief complaint: Abdominal pain  HPI:     Patient presented to hospital on September 11, 2020 with abdominal pain, was found to have acute appendicitis, underwent surgery on same day and since then is improving, he says he is able to take clear liquids and some pills. He says he is now passing gas. He says abdominal pain has lessened. He denies chest pain or shortness of breath. During the course of admission he has been noted to have atrial fibrillation with rapid ventricular response and borderline hypotension. He has been treated with Cardizem drip and digoxin and amiodarone and heart rates have improved. He denies palpitations or dizziness or loss of consciousness.       Medications/Labs all Reviewed:  Patient Active Problem List   Diagnosis    Primary osteoarthritis of both knees    Lymphedema    Other specified peripheral vascular diseases ([address]    Non-pressure chronic ulcer of left calf with fat layer exposed ([address]    Morbid obesity ([address]    Peripheral venous insufficiency    Acute appendicitis with localized peritonitis, without perforation, abscess, or gangrene    Perforated appendicitis    Atrial fibrillation ([address]    Hyperlipidemia    Diabetes mellitus ([address]    Hypertension       Medications:  amiodarone (CORDARONE) tablet 200 mg, Daily  dilTIAZem 125 mg in dextrose 5 % 125 mL infusion, Continuous  warfarin (COUMADIN) daily dosing (placeholder), RX Placeholder  meropenem (MERREM) 1 g in sodium chloride 0.9 % 100 mL IVPB (mini-bag), Q8H  metoprolol (LOPRESSOR) injection 5 mg, Q6H  docusate sodium (COLACE) capsule 100 mg, Daily  prochlorperazine (COMPAZINE) injection 10 mg, Q6H PRN  naloxone (NARCAN) injection 0.4 mg, PRN  insulin lispro (HUMALOG) injection vial 0-6 Units, TID WC  insulin lispro (HUMALOG) injection vial 0-3 Units, Nightly  glucose (GLUTOSE) 40 % oral gel 15 g, PRN  dextrose 50 % IV solution, PRN  glucagon (rDNA) injection 1 mg, PRN  dextrose 5 % solution, PRN  HYDROcodone-acetaminophen (NORCO) 5-325 MG per tablet 1 tablet, Q4H PRN    Or  HYDROcodone-acetaminophen (NORCO) 5-325 MG per tablet 2 tablet, Q4H PRN  morphine (PF) injection 4 mg, Q30 Min PRN  ondansetron (ZOFRAN) injection 4 mg, Q30 Min PRN  allopurinol (ZYLOPRIM) tablet 100 mg, Daily  atorvastatin (LIPITOR) tablet 10 mg, Daily  azelastine (ASTELIN) 0.1 % nasal spray 1 spray, BID  clonazePAM (KLONOPIN) tablet 0.5 mg, BID PRN  digoxin (LANOXIN) tablet 125 mcg, Daily  gabapentin (NEURONTIN) capsule 300 mg, TID  levothyroxine (SYNTHROID) tablet 112 mcg, Daily  cetirizine (ZYRTEC) tablet 10 mg, Daily  [Held by provider] metoprolol succinate (TOPROL XL) extended release tablet 200 mg, Daily  montelukast (SINGULAIR) tablet 10 mg, Nightly  spironolactone (ALDACTONE) tablet 25 mg, Daily  torsemide (DEMADEX) tablet 20 mg, Daily  sodium chloride flush 0.9 % injection 10 mL, 2 times per day  sodium chloride flush 0.9 % injection 10 mL, PRN  promethazine (PHENERGAN) tablet 12.5 mg, Q6H PRN    Or  ondansetron (ZOFRAN) injection 4 mg, Q6H PRN  0.9 % sodium chloride infusion, Continuous  HYDROmorphone (DILAUDID) injection 1 mg, Q2H PRN  acetaminophen (TYLENOL) tablet 650 mg, Q4H PRN  famotidine (PEPCID) injection 20 mg, BID    lidocaine (LMX) 4 % cream, Once           PHYSICAL EXAM   BP 98/65   Pulse 93   Temp 98.4 °F (36.9 °C) (Oral)   Resp 22   Ht 6' 5\" (1.956 m)   Wt (!) 417 lb 5.3 oz (189.3 kg)   SpO2 94%   BMI 49.49 kg/m²    Vitals:    09/15/20 2215 09/16/20 0300 09/16/20 0327 09/16/20 0800   BP: 127/74 100/68  98/65   Pulse: 98 110  93   Resp: 20 20 22   Temp: 98.4 °F (36.9 °C) 97.9 °F (36.6 °C)  98.4 °F (36.9 °C)   TempSrc: Oral Oral  Oral   SpO2: 95% 94%  94%   Weight:   (!) 417 lb 5.3 oz (189.3 kg)    Height:             Intake/Output Summary (Last 24 hours) at 9/16/2020 0898  Last data filed at 9/16/2020 0736  Gross per 24 hour Intake 0 ml   Output 3005 ml   Net -3005 ml     Wt Readings from Last 3 Encounters:   09/16/20 (!) 417 lb 5.3 oz (189.3 kg)   08/30/20 (!) 420 lb (190.5 kg)   03/10/20 (!) 411 lb 12.8 oz (186.8 kg)         Gen: Patient in NAD, resting comfortably, sitting on side of bed  Neck: No JVD or bruits  Respiratory: CTAB no WRR  Chest: normal without deformity  Cardiovascular: Irregular rate and rhythm, S1-S2, distant heart sounds  Abdomen: Distended, decreased bowel sounds  Extremities: Trace to 1+ bilateral lower extremity edema  Neurological/Psychiatric: AxO x4, No gross motors/sensory deficits  Skin:  Warm and dry      Labs:  CBC:   Recent Labs     09/14/20  0957 09/15/20  0601 09/16/20  0635   WBC 8.3 6.9 7.5   HGB 12.8* 12.0* 11.7*   HCT 39.0* 36.1* 34.5*   MCV 92.5 90.5 90.7    175 165     BMP:   Recent Labs     09/14/20  0600 09/15/20  0601 09/16/20  0635    139 137   K 4.8 4.2 4.0   CL 99 101 99   CO2 28 26 27   BUN 26* 26* 24*   CREATININE 0.9 0.8 0.7*     MG:  No results for input(s): MG in the last 72 hours. PT/INR:   Recent Labs     09/14/20  1217 09/15/20  0601 09/16/20  0635   PROTIME 20.3* 17.9* 16.5*   INR 1.74* 1.54* 1.42*     APTT: No results for input(s): APTT in the last 72 hours. Cardiac Enzymes:   Recent Labs     09/13/20  1949   TROPONINI <0.01       Cardiac Studies:    EKG, atrial fibrillation, rapid ventricular response      I have reviewed labs and imaging/xray/diagnostic testing in this note.     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    Hypertension     Atrial fibrillation with rapid ventricular response, this is likely a physiological response to patient's recovery from appendicitis, continue digoxin, amiodarone, will transition diltiazem drip to oral diltiazem. Coumadin reordered.     Nonischemic cardio myopathy, will resume home medicines closer to discharge and when patient is able to take p.o. medications more fully. Obtain echocardiogram.     Hypertension, now with borderline blood pressures, treatment as above and currently holding lisinopril     Chronic systolic heart failure, appears to be compensated, will order daily weights, continue diuretics    Thank you for allowing me to participate in the care of your patient. Please call me with any questions [phone].       Frida Cortes MD, [facility] [place]   Interventional Cardiologist  [facility]  [phone] [facility]  [phone] [address] [place] [address]  9/16/2020 8:29 AM

## 2020-09-16 NOTE — PROGRESS NOTES
Pharmacy to Dose Warfarin    Dx: Afib   Goal INR range 2-3   Home Warfarin dose: 10mg daily except 12.5mg on Wed    Date  INR  Warfarin  9/12                2.07                 10 mg  9/13                1.83                  Held /NPO  9/14                1.74                  Held /NPO    9/15                1.54                  12.5 mg (Rx consulted)  9/16                1.42                  12.5 mg     Recommend Warfarin 12.5 mg tonight x1. Daily INR ordered. Rx will continue to manage therapy per Dr. Dereje Hogan consult order.   Yanet Almonte/Armando. 9/16/20 9:17 AM EDT\

## 2020-09-16 NOTE — CARE COORDINATION
Writer left voicemail for [address] RN to see if they need updated pt information to resume home care at discharge, and if so, where to fax at discharge. Will await return call. Buster Meade 42 Addendum:  Writer received call from [address] [place] [address], pt is active with them. DCP to fax orders directly to them at discharge, fax [phone].   MAHSA Meade-RN

## 2020-09-16 NOTE — PROGRESS NOTES
Hospitalist Progress Note      PCP: Bushra Castillo    Date of Admission: 9/11/2020    Chief Complaint: hypoxia      Subjective: Tolerating clears. Some nausea at times, but no vomiting. Post-op abd pain improved. BP improved.   In AF; HR improved 90-100s    Medications:  Reviewed    Infusion Medications    dextrose      sodium chloride 75 mL/hr at 09/16/20 1446     Scheduled Medications    dilTIAZem  30 mg Oral 4 times per day    warfarin  12.5 mg Oral Once    amiodarone  200 mg Oral Daily    warfarin (COUMADIN) daily dosing (placeholder)   Other RX Placeholder    meropenem  1 g Intravenous Q8H    metoprolol  5 mg Intravenous Q6H    docusate sodium  100 mg Oral Daily    insulin lispro  0-6 Units Subcutaneous TID WC    insulin lispro  0-3 Units Subcutaneous Nightly    allopurinol  100 mg Oral Daily    atorvastatin  10 mg Oral Daily    azelastine  1 spray Each Nostril BID    digoxin  125 mcg Oral Daily    gabapentin  300 mg Oral TID    levothyroxine  112 mcg Oral Daily    cetirizine  10 mg Oral Daily    [Held by provider] metoprolol succinate  200 mg Oral Daily    montelukast  10 mg Oral Nightly    spironolactone  25 mg Oral Daily    torsemide  20 mg Oral Daily    sodium chloride flush  10 mL Intravenous 2 times per day    famotidine (PEPCID) injection  20 mg Intravenous BID     PRN Meds: perflutren lipid microspheres, prochlorperazine, naloxone, glucose, dextrose, glucagon (rDNA), dextrose, HYDROcodone 5 mg - acetaminophen **OR** HYDROcodone 5 mg - acetaminophen, morphine, ondansetron, clonazePAM, sodium chloride flush, promethazine **OR** ondansetron, HYDROmorphone, acetaminophen      Intake/Output Summary (Last 24 hours) at 9/16/2020 1559  Last data filed at 9/16/2020 1508  Gross per 24 hour   Intake --   Output 2815 ml   Net -2815 ml       Exam:    /75   Pulse 110   Temp 97.4 °F (36.3 °C) (Oral)   Resp 20   Ht 6' 5\" (1.956 m)   Wt (!) 417 lb 5.3 oz (189.3 kg)   SpO2 96%   BMI 49.49 kg/m²     Gen/overall appearance: Not in acute distress. Alert. Head: Normocephalic, atraumatic  Eyes: EOMI, no scleral icterus  CVS: irreg irreg, Normal S1S2  Pulm: Clear to auscultation bilaterally. No crackles/wheezes  Gastrointestinal: Soft, nontender, obese, no guarding or rebound, lap incisions with overlying dressing  Extremities: no edema. No erythema or warmth  Neuro: No gross focal deficits noted  Skin: Warm, dry    Labs:   Recent Labs     09/14/20  0957 09/15/20  0601 09/16/20  0635   WBC 8.3 6.9 7.5   HGB 12.8* 12.0* 11.7*   HCT 39.0* 36.1* 34.5*    175 165     Recent Labs     09/14/20  0600 09/15/20  0601 09/16/20  0635    139 137   K 4.8 4.2 4.0   CL 99 101 99   CO2 28 26 27   BUN 26* 26* 24*   CREATININE 0.9 0.8 0.7*   CALCIUM 9.7 9.3 9.0     No results for input(s): AST, ALT, BILIDIR, BILITOT, ALKPHOS in the last 72 hours. Recent Labs     09/14/20  1217 09/15/20  0601 09/16/20  0635   INR 1.74* 1.54* 1.42*     Recent Labs     09/13/20  1949   TROPONINI <0.01       Assessment/Plan:    Active Hospital Problems    Diagnosis Date Noted    [facility] ([facility] [I48.91]     Hyperlipidemia [E78.5]     Diabetes mellitus ([facility] [E11.9]     Hypertension [I10]     Perforated appendicitis [K35.32] 09/11/2020    Morbid obesity ([facility] [E66.01] 03/05/2020     Acute perforated appendicitis s/p appendectomy  -post op management per general surgery  -on meropenem  -prn pain management    Acute episode of hypoxia, nausea, chest pain consistent with acute post op hypoxic respiratory failure. Transient and now resolved. Suspect 2/2 IV opiates  -negative troponin  -tele monitoring  -minimize opiates if possible    Acute on chronic Atrial fibrillation. Now with RVR.   Improving  - start amiodarone  - c/w po digoxin  - resume metoprolol  - home AC with coumadin  - ECHO  - closely monitor and replace lytes  - cardio following     Morbid obesity  -With Body mass index is 51.4 kg/m². Complicating assessment and treatment. Placing patient at risk for multiple co-morbidities as well as early death and contributing to the patient's presentation. Counseled on weight loss     DM2 with peripheral neuropathy, controlled.   Hgb a1c 7.1  -ldssi  -poct ac/hs  -hypoglycemia protocol  -carb control diet when eating, currently NPO  -continue gabapentin     PMH of Essential HTN, hld  -continue home regimen    Diet: Diet NPO Effective Now Exceptions are: Sips with Meds, Ice Chips  Code Status: Full Code    Dispo - Елена Gutierrez MD

## 2020-09-16 NOTE — PROGRESS NOTES
[facility] GENERAL SURGERY    Surgery Progress Note           POD # 5    PATIENT NAME: Arsenio Thurman     TODAY'S DATE: 9/16/2020    INTERVAL HISTORY:    Pt doing okay, feeling some nausea this AM - but states it is because he thinks he had a bunch of pills. Ngt outputs are unclear at this time as documentation not recorded 2 shifts yesterday - only 200ml in canister this AM. Denies bloating, passing flatus. No fevers     OBJECTIVE:   VITALS:  BP 98/65   Pulse 93   Temp 98.4 °F (36.9 °C) (Oral)   Resp 22   Ht 6' 5\" (1.956 m)   Wt (!) 417 lb 5.3 oz (189.3 kg)   SpO2 94%   BMI 49.49 kg/m²     INTAKE/OUTPUT:    I/O last 3 completed shifts: In: 0   Out: 1655 [Urine:725; Emesis/NG output:800; Drains:130]  I/O this shift:  In: -   Out: 1350 [Urine:300; Emesis/NG output:1000; Drains:50]              CONSTITUTIONAL:  awake and alert  ABDOMEN:   hypoactive bowel sounds, soft, obese, non distended, appropriately tender, gisel serosanguinous  INCISION: no drainage    Data:  CBC:   Recent Labs     09/14/20  0957 09/15/20  0601 09/16/20  0635   WBC 8.3 6.9 7.5   HGB 12.8* 12.0* 11.7*   HCT 39.0* 36.1* 34.5*    175 165     BMP:    Recent Labs     09/14/20  0600 09/15/20  0601 09/16/20  0635    139 137   K 4.8 4.2 4.0   CL 99 101 99   CO2 28 26 27   BUN 26* 26* 24*   CREATININE 0.9 0.8 0.7*   GLUCOSE 136* 109* 102*         ASSESSMENT AND PLAN:  60 y.o. male status post lap appy for perforated appendicitis    PO Ileus: improving - clamp ngt for longer period today and remove if tolerated and outputs low. Afib: cardiology managing  Activity: PT/OT  Morbid Obesity: BMI : 60.73 Complicating assessment and treatment. Placing patient at risk for multiple co-morbidities and complications. ID: continue with IV antibiotics given perforation    Electronically signed by Theta Mcardle, APRN - CNP     Patient seen and agree with above.     Tyrone Chapman MD

## 2020-09-17 LAB
ANION GAP SERPL CALCULATED.3IONS-SCNC: 9 MMOL/L (ref 3–16)
BUN BLDV-MCNC: 22 MG/DL (ref 7–20)
CALCIUM SERPL-MCNC: 9.2 MG/DL (ref 8.3–10.6)
CHLORIDE BLD-SCNC: 96 MMOL/L (ref 99–110)
CO2: 27 MMOL/L (ref 21–32)
CREAT SERPL-MCNC: 0.8 MG/DL (ref 0.8–1.3)
GFR AFRICAN AMERICAN: >60
GFR NON-AFRICAN AMERICAN: >60
GLUCOSE BLD-MCNC: 109 MG/DL (ref 70–99)
GLUCOSE BLD-MCNC: 116 MG/DL (ref 70–99)
GLUCOSE BLD-MCNC: 119 MG/DL (ref 70–99)
GLUCOSE BLD-MCNC: 121 MG/DL (ref 70–99)
HCT VFR BLD CALC: 36.9 % (ref 40.5–52.5)
HEMOGLOBIN: 12.3 G/DL (ref 13.5–17.5)
INR BLD: 1.46 (ref 0.86–1.14)
LV EF: 53 %
LVEF MODALITY: NORMAL
MCH RBC QN AUTO: 30.2 PG (ref 26–34)
MCHC RBC AUTO-ENTMCNC: 33.3 G/DL (ref 31–36)
MCV RBC AUTO: 90.8 FL (ref 80–100)
PDW BLD-RTO: 14.7 % (ref 12.4–15.4)
PERFORMED ON: ABNORMAL
PLATELET # BLD: 199 K/UL (ref 135–450)
PMV BLD AUTO: 9.2 FL (ref 5–10.5)
POTASSIUM SERPL-SCNC: 4 MMOL/L (ref 3.5–5.1)
PROTHROMBIN TIME: 17 SEC (ref 10–13.2)
RBC # BLD: 4.06 M/UL (ref 4.2–5.9)
SODIUM BLD-SCNC: 132 MMOL/L (ref 136–145)
WBC # BLD: 6.8 K/UL (ref 4–11)

## 2020-09-17 PROCEDURE — 6370000000 HC RX 637 (ALT 250 FOR IP): Performed by: NURSE PRACTITIONER

## 2020-09-17 PROCEDURE — 85610 PROTHROMBIN TIME: CPT

## 2020-09-17 PROCEDURE — 2500000003 HC RX 250 WO HCPCS: Performed by: SURGERY

## 2020-09-17 PROCEDURE — 6360000002 HC RX W HCPCS: Performed by: SURGERY

## 2020-09-17 PROCEDURE — 85027 COMPLETE CBC AUTOMATED: CPT

## 2020-09-17 PROCEDURE — 6370000000 HC RX 637 (ALT 250 FOR IP): Performed by: SURGERY

## 2020-09-17 PROCEDURE — 6370000000 HC RX 637 (ALT 250 FOR IP): Performed by: INTERNAL MEDICINE

## 2020-09-17 PROCEDURE — 97110 THERAPEUTIC EXERCISES: CPT

## 2020-09-17 PROCEDURE — 2500000003 HC RX 250 WO HCPCS: Performed by: INTERNAL MEDICINE

## 2020-09-17 PROCEDURE — 80048 BASIC METABOLIC PNL TOTAL CA: CPT

## 2020-09-17 PROCEDURE — 2580000003 HC RX 258: Performed by: INTERNAL MEDICINE

## 2020-09-17 PROCEDURE — 97116 GAIT TRAINING THERAPY: CPT

## 2020-09-17 PROCEDURE — 99024 POSTOP FOLLOW-UP VISIT: CPT | Performed by: SURGERY

## 2020-09-17 PROCEDURE — APPSS45 APP SPLIT SHARED TIME 31-45 MINUTES: Performed by: CLINICAL NURSE SPECIALIST

## 2020-09-17 PROCEDURE — 99233 SBSQ HOSP IP/OBS HIGH 50: CPT | Performed by: NURSE PRACTITIONER

## 2020-09-17 PROCEDURE — 2580000003 HC RX 258: Performed by: SURGERY

## 2020-09-17 PROCEDURE — 6360000002 HC RX W HCPCS: Performed by: INTERNAL MEDICINE

## 2020-09-17 PROCEDURE — 1200000000 HC SEMI PRIVATE

## 2020-09-17 PROCEDURE — 93306 TTE W/DOPPLER COMPLETE: CPT

## 2020-09-17 PROCEDURE — B246ZZZ ULTRASONOGRAPHY OF RIGHT AND LEFT HEART: ICD-10-PCS | Performed by: INTERNAL MEDICINE

## 2020-09-17 RX ORDER — LISINOPRIL 10 MG/1
10 TABLET ORAL NIGHTLY
Status: DISCONTINUED | OUTPATIENT
Start: 2020-09-17 | End: 2020-09-25 | Stop reason: HOSPADM

## 2020-09-17 RX ORDER — METOPROLOL SUCCINATE 50 MG/1
100 TABLET, EXTENDED RELEASE ORAL DAILY
Status: DISCONTINUED | OUTPATIENT
Start: 2020-09-17 | End: 2020-09-20

## 2020-09-17 RX ORDER — CALCIUM CARBONATE 200(500)MG
500 TABLET,CHEWABLE ORAL 3 TIMES DAILY PRN
Status: DISCONTINUED | OUTPATIENT
Start: 2020-09-17 | End: 2020-09-25 | Stop reason: HOSPADM

## 2020-09-17 RX ADMIN — ALLOPURINOL 100 MG: 100 TABLET ORAL at 09:26

## 2020-09-17 RX ADMIN — DILTIAZEM HYDROCHLORIDE 30 MG: 60 TABLET, FILM COATED ORAL at 05:49

## 2020-09-17 RX ADMIN — MEROPENEM 1 G: 1 INJECTION, POWDER, FOR SOLUTION INTRAVENOUS at 15:33

## 2020-09-17 RX ADMIN — FAMOTIDINE 20 MG: 10 INJECTION INTRAVENOUS at 20:28

## 2020-09-17 RX ADMIN — MEROPENEM 1 G: 1 INJECTION, POWDER, FOR SOLUTION INTRAVENOUS at 09:24

## 2020-09-17 RX ADMIN — DIGOXIN 125 MCG: 125 TABLET ORAL at 09:30

## 2020-09-17 RX ADMIN — Medication 10 ML: at 09:33

## 2020-09-17 RX ADMIN — METOPROLOL TARTRATE 5 MG: 1 INJECTION, SOLUTION INTRAVENOUS at 03:08

## 2020-09-17 RX ADMIN — SODIUM CHLORIDE: 9 INJECTION, SOLUTION INTRAVENOUS at 03:08

## 2020-09-17 RX ADMIN — LISINOPRIL 10 MG: 10 TABLET ORAL at 20:28

## 2020-09-17 RX ADMIN — Medication 10 ML: at 20:28

## 2020-09-17 RX ADMIN — CETIRIZINE HYDROCHLORIDE 10 MG: 10 TABLET, FILM COATED ORAL at 09:26

## 2020-09-17 RX ADMIN — GABAPENTIN 300 MG: 300 CAPSULE ORAL at 09:26

## 2020-09-17 RX ADMIN — WARFARIN SODIUM 12.5 MG: 2.5 TABLET ORAL at 18:46

## 2020-09-17 RX ADMIN — LEVOTHYROXINE SODIUM 112 MCG: 0.11 TABLET ORAL at 09:35

## 2020-09-17 RX ADMIN — ONDANSETRON 4 MG: 2 INJECTION INTRAMUSCULAR; INTRAVENOUS at 23:57

## 2020-09-17 RX ADMIN — GABAPENTIN 300 MG: 300 CAPSULE ORAL at 13:47

## 2020-09-17 RX ADMIN — FAMOTIDINE 20 MG: 10 INJECTION INTRAVENOUS at 09:25

## 2020-09-17 RX ADMIN — ATORVASTATIN CALCIUM 10 MG: 10 TABLET, FILM COATED ORAL at 20:28

## 2020-09-17 RX ADMIN — ANTACID TABLETS 500 MG: 500 TABLET, CHEWABLE ORAL at 18:50

## 2020-09-17 RX ADMIN — METOPROLOL SUCCINATE 100 MG: 50 TABLET, EXTENDED RELEASE ORAL at 12:11

## 2020-09-17 ASSESSMENT — PAIN SCALES - GENERAL
PAINLEVEL_OUTOF10: 3
PAINLEVEL_OUTOF10: 0

## 2020-09-17 ASSESSMENT — PAIN DESCRIPTION - PAIN TYPE: TYPE: ACUTE PAIN

## 2020-09-17 ASSESSMENT — PAIN DESCRIPTION - LOCATION: LOCATION: OTHER (COMMENT)

## 2020-09-17 NOTE — PLAN OF CARE
Problem: Nutrition  Goal: Optimal nutrition therapy  Outcome: Ongoing  Note: Nutrition Problem #1: Inadequate oral intake  Intervention: Food and/or Nutrient Delivery: Continue Current Diet  Nutritional Goals: Pt will consume greater than 50% of meals this admission w/o GI disturbances

## 2020-09-17 NOTE — PROGRESS NOTES
[facility] GENERAL SURGERY    Surgery Progress Note           POD # 6    PATIENT NAME: Mirna Calvo     TODAY'S DATE: 9/17/2020    INTERVAL HISTORY:    Pt dup in chair - feeling much better today - matty clears, very hungry. Now having BMs. No nausea. OBJECTIVE:   VITALS:  /65   Pulse 94   Temp 97.5 °F (36.4 °C) (Oral)   Resp 18   Ht 6' 5\" (1.956 m)   Wt (!) 417 lb 5.3 oz (189.3 kg)   SpO2 94%   BMI 49.49 kg/m²     INTAKE/OUTPUT:    I/O last 3 completed shifts: In: 9925 [P.O.:720; I.V.:673]  Out: 3505 [Urine:1575; Emesis/NG output:1700; Drains:230]  I/O this shift: In: 500 [P.O.:500]  Out: 0               CONSTITUTIONAL:  awake and alert  ABDOMEN:   soft, obese, non distended, appropriately tender, gisel serosanguinous  INCISION: no drainage    Data:  CBC:   Recent Labs     09/15/20  0601 09/16/20  0635 09/17/20  0600   WBC 6.9 7.5 6.8   HGB 12.0* 11.7* 12.3*   HCT 36.1* 34.5* 36.9*    165 199     BMP:    Recent Labs     09/15/20  0601 09/16/20  0635 09/17/20  0600    137 132*   K 4.2 4.0 4.0    99 96*   CO2 26 27 27   BUN 26* 24* 22*   CREATININE 0.8 0.7* 0.8   GLUCOSE 109* 102* 109*         ASSESSMENT AND PLAN:  60 y.o. male status post lap appy for perforated appendicitis    PO Ileus: resolved - advance to soft diet, saline lock IV  Afib: cardiology managing  Activity: PT/OT  Morbid Obesity: BMI : 77.68 Complicating assessment and treatment. Placing patient at risk for multiple co-morbidities and complications. ID: continue with IV antibiotics given perforation    Discussed with pt - he indicates that cardiology told him likely d/c to home tomorrow so as to monitor his BM today    Electronically signed by AIDA Ramirez - CNP     Surgery Staff    I have examined this patient and read and agree with the note by Yulissa Garcia CNP from today.   Home soon pending tolerance of diet and Cardiology clearance    [facility]

## 2020-09-17 NOTE — PROGRESS NOTES
[facility]   Daily Progress Note    Admit Date:  9/11/2020  HPI:    Chief Complaint   Patient presents with    Abdominal Pain     patient called EMS for RLQ abdominal pain which started \"a couple days ago\", c/o worsening pain today.  Nausea      Presented with abdominal pain, + acute perforated appendicitis, s/p appy 9/11/20. Cardiology consulted for AFib with RVR. Treated with IV diltiazem and metoprolol, started on amiodarone    Hx of NICM, sCHF, AFib s/p prior ablations now treatment with rate control and anticoagulation. Subjective:  Mr. Renita Durant sitting up in chair. Denies any shortness of breath or palpitations. Had watery stool this am.  NG removed last pm and started on clear liquids, now being advanced to regular diet. Objective:   /71   Pulse 94   Temp 97.5 °F (36.4 °C) (Oral)   Resp 18   Ht 6' 5\" (1.956 m)   Wt (!) 417 lb 5.3 oz (189.3 kg)   SpO2 94%   BMI 49.49 kg/m²       Intake/Output Summary (Last 24 hours) at 9/17/2020 1132  Last data filed at 9/17/2020 0909  Gross per 24 hour   Intake 1893 ml   Output 1175 ml   Net 718 ml     Wt Readings from Last 3 Encounters:   09/16/20 (!) 417 lb 5.3 oz (189.3 kg)   08/30/20 (!) 420 lb (190.5 kg)   03/10/20 (!) 411 lb 12.8 oz (186.8 kg)         ASSESSMENT:   1. Afib - rate with fair control, anticoagulation with warfarin, on po dilt 30 qid, metoprolol 5 mg IV and digoxin  2. Cardiomyopathy, non-ischemic - EF 30-35%, s/p ICD, on evidence-based beta blocker, ACEi and Patrick antagonist, and digoxin  3. CHF, chronic systolic - euvolemic on exam  4. HTN -  Now hypotensive  5. Appendicitis      PLAN:  1. Hold torsemide and spironolactone today  2. Start po metoprolol (Toprol XL) at half dose today then anticipate increase to home dose tomorrow prior to discharge  3. Restart lisinopril at lower dose tonight  4. Stop diltiazem  5. Continue digoxin at lower dose since started on amiodarone  6.  Consider stopping amiodarone tomorrow if HR controlled with resumption of meds    AIDA Cancino CNP, 9/17/2020, 11:32 AM  [facility]   [phone]       Telemetry: AFib   NYHA: III    Physical Exam:  General:  Awake, alert, NAD  Skin:  Warm and dry  Neck:  JVP 8 cm upright  Chest:  Clear to auscultation   Cardiovascular:  Irreg Irreg, normal S1S2, no m/g/r  Abdomen:  Soft, nontender, +bowel sounds  Extremities:  1+ BLE edema, ACE wrap to LLE (chronic wounds per patient - not examined)        Medications:    dilTIAZem  30 mg Oral 4 times per day    amiodarone  200 mg Oral Daily    warfarin (COUMADIN) daily dosing (placeholder)   Other RX Placeholder    meropenem  1 g Intravenous Q8H    metoprolol  5 mg Intravenous Q6H    docusate sodium  100 mg Oral Daily    insulin lispro  0-6 Units Subcutaneous TID WC    insulin lispro  0-3 Units Subcutaneous Nightly    allopurinol  100 mg Oral Daily    atorvastatin  10 mg Oral Daily    azelastine  1 spray Each Nostril BID    digoxin  125 mcg Oral Daily    gabapentin  300 mg Oral TID    levothyroxine  112 mcg Oral Daily    cetirizine  10 mg Oral Daily    [Held by provider] metoprolol succinate  200 mg Oral Daily    montelukast  10 mg Oral Nightly    spironolactone  25 mg Oral Daily    torsemide  20 mg Oral Daily    sodium chloride flush  10 mL Intravenous 2 times per day    famotidine (PEPCID) injection  20 mg Intravenous BID      dextrose      sodium chloride 75 mL/hr at 09/17/20 0308       Lab Data: Lab results independently reviewed by myself 9/17/20   CBC:   Recent Labs     09/15/20  0601 09/16/20  0635 09/17/20  0600   WBC 6.9 7.5 6.8   HGB 12.0* 11.7* 12.3*    165 199     BMP:    Recent Labs     09/15/20  0601 09/16/20  0635 09/17/20  0600    137 132*   K 4.2 4.0 4.0   CO2 26 27 27   BUN 26* 24* 22*   CREATININE 0.8 0.7* 0.8     INR:    Recent Labs     09/15/20  0601 09/16/20  0635 09/17/20  0600   INR 1.54* 1.42* 1.46*     BNP:  No results for input(s): PROBNP in the last 72 hours. Cardiac Enzymes: No results for input(s): TROPONINI in the last 72 hours. Lipids: No results found for: TRIG, HDL, LDLCALC, LDLDIRECT    Cardiac Imaging:    Echo:     2018 at [facility]:     Study Conclusions    - Left ventricle: The cavity size was dilated. There was mild    concentric hypertrophy. Systolic function was moderately reduced.    The estimated ejection fraction was in the range of 30% to 35%.    Wall motion was normal; there were no regional wall motion    abnormalities. Diastolic dysfunction; unable to assess left    atrial pressure. - Aortic valve: Thickening, consistent with sclerosis. - Mitral valve: The annulus was mildly calcified. - Left atrium: The atrium was mildly dilated. - Right ventricle: The cavity size was mildly dilated. Wall    thickness was normal. Pacer wire noted in right ventricle. - Systemic veins: Poorly visualized. - Inferior vena cava: The vessel was normal in size; the    respirophasic diameter changes were in the normal range (>= 50%);    findings are consistent with normal central venous pressure.

## 2020-09-17 NOTE — PROGRESS NOTES
previous session.   Referring Practitioner: Liza Brothers MD  Subjective  Subjective: Pt agrees to therapy today, states he has no concerns about mobility at home, states he does not need additional therapy  General Comment  Comments: pt up in chair on approach, requests to remain up in chair at end of session  Pain Screening  Patient Currently in Pain: Denies(denies pain at rest and with ambulation)       Orientation  Overall Orientation Status: Within Normal Limits     Objective   Bed mobility  Comment: not observed, pt up in chair on approach and on exit  Transfers  Sit to Stand: Modified independent  Stand to sit: Modified independent  Comment: demonstrated transfers from chair with arm rests  Ambulation  Surface: level tile  Device: Rolling Walker(bariatric)  Assistance: Stand by assistance  Quality of Gait: WFL  Distance: 60 ft  Comments: Pt reports he only amb short distances at home, states he is at baseline, denies any concerns about mobility at home        Exercises  Quad Sets: 10 x B  Hip Flexion: Standing march 15 x B  Ankle Pumps: 15 x B      AM-PAC Score  AM-PAC Inpatient Mobility Raw Score : 23 (09/17/20 1022)  AM-PAC Inpatient T-Scale Score : 56.93 (09/17/20 1022)  Mobility Inpatient CMS 0-100% Score: 11.2 (09/17/20 1022)  Mobility Inpatient CMS G-Code Modifier : CI (09/17/20 1022)   Goals  Short term goals  Time Frame for Short term goals: 9/20  Short term goal 1: Pt will complete bed mobility with SBA- MET 9/15  Short term goal 2: Pt will ambulate x 20' with RW with CGA- 9/17 pt amb with RW and SBA 60 ft- MET  Short term goal 3: Pt will copmlete B LE to improve functional mobility by 9/16- 9/17 MET  Patient Goals   Patient goals : to go home    Plan    Times per week: 3-5x/week  Specific instructions for Next Treatment: ex, advance mobility  Current Treatment Recommendations: Strengthening, Balance Training, Endurance Training, Functional Mobility Training, Home Exercise Program, Positioning, Modalities  Safety Devices  Type of devices: Left in chair, Patient at risk for falls, Nurse notified, Call light within reach(pt refuses chair alarm, voices understanding of fall risk policy, states he will use call light for staff assist when up)  Restraints  Initially in place: No     Therapy Time   Individual Concurrent Group Co-treatment   Time In 1010         Time Out 1033         Minutes [address] [facility], PT

## 2020-09-17 NOTE — PROGRESS NOTES
Pharmacy to Dose Warfarin    Dx: Afib   Goal INR range 2-3   Home Warfarin dose: 10mg daily except 12.5mg on Wed    Date  INR  Warfarin  9/12                2.07                 10 mg  9/13                1.83                  Held /NPO  9/14                1.74                  Held /NPO    9/15                1.54                  12.5 mg (Rx consulted)  9/16                1.42                  12.5 mg   9/17                1.46                  12.5    Recommend Warfarin 12.5 mg tonight x1. Daily INR ordered. Rx will continue to manage therapy per Dr. Valerio Gardner consult order.   Yanet Almonte/[facility]. 9/17/20 12:05 PM EDT

## 2020-09-17 NOTE — PROGRESS NOTES
Comprehensive Nutrition Assessment    Type and Reason for Visit:  Initial(LOS)    Nutrition Recommendations/Plan:   1. Continue low fiber diet - monitor need for addition of carb control   2. Encourage PO intakes   3. Encourage diet compliance and healthy lifestyle   4. Monitor nutrition adequacy, pertinent labs, bowel habits, wt changes, and clinical progress    Nutrition Assessment:  Pt admitted with perforated appendicitis. PMH of DM , HLD, HTN. S/p appendectomy. Ileus this admission, now resolved. Pt nutritionally compromised AEB NPO/clear liquids x4 days. Diet advanced to low fiber today. Pt reports good appetite and no c/o abd pain, cramping or N/V following PO intakes. C/o diarrhea today after clear liquids. Discussed low fiber diet. Encouraged pt to slowly increase fiber to slow down GI tract for healing. Will continue to monitor. Malnutrition Assessment:  Malnutrition Status: At risk for malnutrition (Comment)      Estimated Daily Nutrient Needs:  Energy (kcal):  1344-1593 kcal; Weight Used for Energy Requirements:  Ideal(94 kg)     Protein (g):   g; Weight Used for Protein Requirements:  Ideal(1.0-1.2 g/kg)        Fluid (ml/day):  1 ml/kcal; Weight Used for Fluid Requirements:  Carlos      Nutrition Related Findings:  Active BS, NGT removed, BG stable      Wounds:  Venous Stasis(surgical incisions)       Current Nutrition Therapies:    DIET LOW FIBER;     Anthropometric Measures:  · Height: 6' 5\" (195.6 cm)  · Current Body Weight: 417 lb (189.1 kg)   · Usual Body Weight: 420 lb (190.5 kg)(per EMR weight hx)     · Ideal Body Weight: 208 lbs; % Ideal Body Weight 200.5 %   · BMI: 49.4  · BMI Categories: Obese Class 3 (BMI 40.0 or greater)       Nutrition Diagnosis:   · Inadequate oral intake related to lack or limited access to food, altered GI function as evidenced by NPO or clear liquid status due to medical condition      Nutrition Interventions:   Food and/or Nutrient Delivery:  Continue Current Diet  Nutrition Education/Counseling:  Education completed   Coordination of Nutrition Care:  Continued Inpatient Monitoring    Goals:  Pt will consume greater than 50% of meals this admission w/o GI disturbances       Nutrition Monitoring and Evaluation:   Food/Nutrient Intake Outcomes:  Food and Nutrient Intake, Diet Advancement/Tolerance  Physical Signs/Symptoms Outcomes:  Biochemical Data, GI Status, Weight     Discharge Planning:    Continue current diet     Electronically signed by Dave Rapp MS, RD, LD on 9/17/20 at 3:47 PM EDT    Contact: Office: [phone]

## 2020-09-17 NOTE — PROGRESS NOTES
Hospitalist Progress Note      PCP: Tommy Hoffman    Date of Admission: 9/11/2020    Chief Complaint: hypoxia      Subjective: Tolerating clears. Denies n/v, abd pain. Did have 2 episodes of diarrhea this morning.     Medications:  Reviewed    Infusion Medications    dextrose      sodium chloride 75 mL/hr at 09/17/20 0308     Scheduled Medications    metoprolol succinate  100 mg Oral Daily    lisinopril  10 mg Oral Nightly    warfarin  12.5 mg Oral Once    amiodarone  200 mg Oral Daily    warfarin (COUMADIN) daily dosing (placeholder)   Other RX Placeholder    meropenem  1 g Intravenous Q8H    docusate sodium  100 mg Oral Daily    insulin lispro  0-6 Units Subcutaneous TID WC    insulin lispro  0-3 Units Subcutaneous Nightly    allopurinol  100 mg Oral Daily    atorvastatin  10 mg Oral Daily    azelastine  1 spray Each Nostril BID    digoxin  125 mcg Oral Daily    gabapentin  300 mg Oral TID    levothyroxine  112 mcg Oral Daily    cetirizine  10 mg Oral Daily    montelukast  10 mg Oral Nightly    [Held by provider] spironolactone  25 mg Oral Daily    [Held by provider] torsemide  20 mg Oral Daily    sodium chloride flush  10 mL Intravenous 2 times per day    famotidine (PEPCID) injection  20 mg Intravenous BID     PRN Meds: perflutren lipid microspheres, prochlorperazine, naloxone, glucose, dextrose, glucagon (rDNA), dextrose, HYDROcodone 5 mg - acetaminophen **OR** HYDROcodone 5 mg - acetaminophen, morphine, ondansetron, clonazePAM, sodium chloride flush, promethazine **OR** ondansetron, HYDROmorphone, acetaminophen      Intake/Output Summary (Last 24 hours) at 9/17/2020 1238  Last data filed at 9/17/2020 0909  Gross per 24 hour   Intake 1893 ml   Output 1175 ml   Net 718 ml       Exam:    /78   Pulse 91   Temp 98.3 °F (36.8 °C) (Oral)   Resp 18   Ht 6' 5\" (1.956 m)   Wt (!) 417 lb 5.3 oz (189.3 kg)   SpO2 95%   BMI 49.49 kg/m²     Gen/overall appearance: Not in acute distress. Alert. Head: Normocephalic, atraumatic  Eyes: EOMI, no scleral icterus  CVS: irreg irreg, Normal S1S2  Pulm: Clear to auscultation bilaterally. No crackles/wheezes  Gastrointestinal: Soft, nontender, obese, no guarding or rebound, lap incisions with overlying dressing  Extremities: trace LE edema. No erythema or warmth  Neuro: No gross focal deficits noted  Skin: Warm, dry    Labs:   Recent Labs     09/15/20  0601 09/16/20  0635 09/17/20  0600   WBC 6.9 7.5 6.8   HGB 12.0* 11.7* 12.3*   HCT 36.1* 34.5* 36.9*    165 199     Recent Labs     09/15/20  0601 09/16/20  0635 09/17/20  0600    137 132*   K 4.2 4.0 4.0    99 96*   CO2 26 27 27   BUN 26* 24* 22*   CREATININE 0.8 0.7* 0.8   CALCIUM 9.3 9.0 9.2     No results for input(s): AST, ALT, BILIDIR, BILITOT, ALKPHOS in the last 72 hours. Recent Labs     09/15/20  0601 09/16/20  0635 09/17/20  0600   INR 1.54* 1.42* 1.46*     No results for input(s): Glean Mandaen in the last 72 hours. Assessment/Plan:    Active Hospital Problems    Diagnosis Date Noted    Chronic anticoagulation [Z79.01]     Non-ischemic cardiomyopathy ([facility] [I42.8]     Chronic systolic congestive heart failure (HCC) [I50.22]     Atrial fibrillation (HCC) [I48.91]     Hyperlipidemia [E78.5]     Diabetes mellitus ([facility] [E11.9]     Hypertension [I10]     Perforated appendicitis [K35.32] 09/11/2020    Morbid obesity ([facility] [E66.01] 03/05/2020     Acute perforated appendicitis s/p appendectomy  -post op management per general surgery  -advance diet per  recs  -on meropenem  -prn pain management    Acute episode of hypoxia, nausea, chest pain consistent with acute post op hypoxic respiratory failure. Transient and now resolved. Suspect 2/2 IV opiates  -negative troponin  -tele monitoring  -minimize opiates if possible    Acute on chronic Atrial fibrillation. Now with RVR.   Improving  - started on amiodarone  - c/w po digoxin  - resume metoprolol as BP tolerates  - home AC with coumadin  - ECHO  - closely monitor and replace lytes  - cardio following     Morbid obesity  -With Body mass index is 62.1 kg/m². Complicating assessment and treatment. Placing patient at risk for multiple co-morbidities as well as early death and contributing to the patient's presentation. Counseled on weight loss     DM2 with peripheral neuropathy, controlled.   Hgb a1c 7.1  -ldssi  -poct ac/hs  -hypoglycemia protocol  -carb control diet when eating  -continue gabapentin     PMH of Essential HTN, hld  -continue home regimen    Diet: DIET LOW FIBER;  Code Status: Full Code    Dispo - Chet Faulkner MD

## 2020-09-18 ENCOUNTER — APPOINTMENT (OUTPATIENT)
Dept: GENERAL RADIOLOGY | Age: 65
DRG: 338 | End: 2020-09-18
Payer: MEDICARE

## 2020-09-18 ENCOUNTER — APPOINTMENT (OUTPATIENT)
Dept: CT IMAGING | Age: 65
DRG: 338 | End: 2020-09-18
Payer: MEDICARE

## 2020-09-18 LAB
ANION GAP SERPL CALCULATED.3IONS-SCNC: 11 MMOL/L (ref 3–16)
BUN BLDV-MCNC: 20 MG/DL (ref 7–20)
CALCIUM SERPL-MCNC: 9.4 MG/DL (ref 8.3–10.6)
CHLORIDE BLD-SCNC: 96 MMOL/L (ref 99–110)
CO2: 30 MMOL/L (ref 21–32)
CREAT SERPL-MCNC: 0.8 MG/DL (ref 0.8–1.3)
GFR AFRICAN AMERICAN: >60
GFR NON-AFRICAN AMERICAN: >60
GLUCOSE BLD-MCNC: 104 MG/DL (ref 70–99)
GLUCOSE BLD-MCNC: 117 MG/DL (ref 70–99)
GLUCOSE BLD-MCNC: 123 MG/DL (ref 70–99)
GLUCOSE BLD-MCNC: 126 MG/DL (ref 70–99)
GLUCOSE BLD-MCNC: 129 MG/DL (ref 70–99)
HCT VFR BLD CALC: 36.9 % (ref 40.5–52.5)
HEMOGLOBIN: 12.3 G/DL (ref 13.5–17.5)
INR BLD: 1.87 (ref 0.86–1.14)
MCH RBC QN AUTO: 30 PG (ref 26–34)
MCHC RBC AUTO-ENTMCNC: 33.5 G/DL (ref 31–36)
MCV RBC AUTO: 89.7 FL (ref 80–100)
PDW BLD-RTO: 14.6 % (ref 12.4–15.4)
PERFORMED ON: ABNORMAL
PLATELET # BLD: 209 K/UL (ref 135–450)
PMV BLD AUTO: 9.3 FL (ref 5–10.5)
POTASSIUM SERPL-SCNC: 4.3 MMOL/L (ref 3.5–5.1)
PROTHROMBIN TIME: 21.8 SEC (ref 10–13.2)
RBC # BLD: 4.11 M/UL (ref 4.2–5.9)
SODIUM BLD-SCNC: 137 MMOL/L (ref 136–145)
WBC # BLD: 6.2 K/UL (ref 4–11)

## 2020-09-18 PROCEDURE — 6360000002 HC RX W HCPCS: Performed by: SURGERY

## 2020-09-18 PROCEDURE — APPNB45 APP NON BILLABLE 31-45 MINUTES: Performed by: CLINICAL NURSE SPECIALIST

## 2020-09-18 PROCEDURE — 99024 POSTOP FOLLOW-UP VISIT: CPT | Performed by: SURGERY

## 2020-09-18 PROCEDURE — 80048 BASIC METABOLIC PNL TOTAL CA: CPT

## 2020-09-18 PROCEDURE — 85027 COMPLETE CBC AUTOMATED: CPT

## 2020-09-18 PROCEDURE — 6360000004 HC RX CONTRAST MEDICATION

## 2020-09-18 PROCEDURE — 74018 RADEX ABDOMEN 1 VIEW: CPT

## 2020-09-18 PROCEDURE — 2580000003 HC RX 258: Performed by: SURGERY

## 2020-09-18 PROCEDURE — APPSS45 APP SPLIT SHARED TIME 31-45 MINUTES: Performed by: CLINICAL NURSE SPECIALIST

## 2020-09-18 PROCEDURE — 2500000003 HC RX 250 WO HCPCS: Performed by: SURGERY

## 2020-09-18 PROCEDURE — 1200000000 HC SEMI PRIVATE

## 2020-09-18 PROCEDURE — 74177 CT ABD & PELVIS W/CONTRAST: CPT

## 2020-09-18 PROCEDURE — 6360000004 HC RX CONTRAST MEDICATION: Performed by: CLINICAL NURSE SPECIALIST

## 2020-09-18 PROCEDURE — 6370000000 HC RX 637 (ALT 250 FOR IP): Performed by: SURGERY

## 2020-09-18 PROCEDURE — 85610 PROTHROMBIN TIME: CPT

## 2020-09-18 PROCEDURE — 6360000002 HC RX W HCPCS: Performed by: INTERNAL MEDICINE

## 2020-09-18 PROCEDURE — 99233 SBSQ HOSP IP/OBS HIGH 50: CPT | Performed by: NURSE PRACTITIONER

## 2020-09-18 PROCEDURE — 6370000000 HC RX 637 (ALT 250 FOR IP): Performed by: NURSE PRACTITIONER

## 2020-09-18 PROCEDURE — 6370000000 HC RX 637 (ALT 250 FOR IP): Performed by: INTERNAL MEDICINE

## 2020-09-18 PROCEDURE — 2580000003 HC RX 258: Performed by: INTERNAL MEDICINE

## 2020-09-18 RX ORDER — DIGOXIN 125 MCG
250 TABLET ORAL DAILY
Status: DISCONTINUED | OUTPATIENT
Start: 2020-09-19 | End: 2020-09-25 | Stop reason: HOSPADM

## 2020-09-18 RX ORDER — WARFARIN SODIUM 5 MG/1
10 TABLET ORAL
Status: COMPLETED | OUTPATIENT
Start: 2020-09-18 | End: 2020-09-18

## 2020-09-18 RX ORDER — METOPROLOL TARTRATE 5 MG/5ML
5 INJECTION INTRAVENOUS EVERY 6 HOURS PRN
Status: DISCONTINUED | OUTPATIENT
Start: 2020-09-18 | End: 2020-09-25 | Stop reason: HOSPADM

## 2020-09-18 RX ADMIN — ONDANSETRON 4 MG: 2 INJECTION INTRAMUSCULAR; INTRAVENOUS at 08:08

## 2020-09-18 RX ADMIN — MEROPENEM 1 G: 1 INJECTION, POWDER, FOR SOLUTION INTRAVENOUS at 16:45

## 2020-09-18 RX ADMIN — Medication 10 ML: at 20:37

## 2020-09-18 RX ADMIN — ONDANSETRON 4 MG: 2 INJECTION INTRAMUSCULAR; INTRAVENOUS at 17:57

## 2020-09-18 RX ADMIN — WARFARIN SODIUM 10 MG: 5 TABLET ORAL at 17:57

## 2020-09-18 RX ADMIN — ANTACID TABLETS 500 MG: 500 TABLET, CHEWABLE ORAL at 04:22

## 2020-09-18 RX ADMIN — FAMOTIDINE 20 MG: 10 INJECTION INTRAVENOUS at 09:37

## 2020-09-18 RX ADMIN — METOPROLOL SUCCINATE 100 MG: 50 TABLET, EXTENDED RELEASE ORAL at 11:51

## 2020-09-18 RX ADMIN — IOPAMIDOL 75 ML: 755 INJECTION, SOLUTION INTRAVENOUS at 13:06

## 2020-09-18 RX ADMIN — Medication 10 ML: at 14:35

## 2020-09-18 RX ADMIN — LEVOTHYROXINE SODIUM 112 MCG: 0.11 TABLET ORAL at 11:51

## 2020-09-18 RX ADMIN — Medication 10 ML: at 17:57

## 2020-09-18 RX ADMIN — Medication 10 ML: at 08:09

## 2020-09-18 RX ADMIN — Medication 10 ML: at 16:49

## 2020-09-18 RX ADMIN — IOHEXOL 50 ML: 240 INJECTION, SOLUTION INTRATHECAL; INTRAVASCULAR; INTRAVENOUS; ORAL at 09:38

## 2020-09-18 RX ADMIN — FAMOTIDINE 20 MG: 10 INJECTION INTRAVENOUS at 20:32

## 2020-09-18 RX ADMIN — MEROPENEM 1 G: 1 INJECTION, POWDER, FOR SOLUTION INTRAVENOUS at 08:08

## 2020-09-18 RX ADMIN — PROCHLORPERAZINE EDISYLATE 10 MG: 5 INJECTION INTRAMUSCULAR; INTRAVENOUS at 04:22

## 2020-09-18 RX ADMIN — PROCHLORPERAZINE EDISYLATE 10 MG: 5 INJECTION INTRAMUSCULAR; INTRAVENOUS at 14:34

## 2020-09-18 RX ADMIN — SODIUM CHLORIDE: 9 INJECTION, SOLUTION INTRAVENOUS at 20:33

## 2020-09-18 RX ADMIN — MEROPENEM 1 G: 1 INJECTION, POWDER, FOR SOLUTION INTRAVENOUS at 00:09

## 2020-09-18 ASSESSMENT — PAIN SCALES - GENERAL
PAINLEVEL_OUTOF10: 0

## 2020-09-18 NOTE — PROGRESS NOTES
Assessment complete. VSS pt vomiting this morning. Med given. Dressing change provided to DESTIN drain site.

## 2020-09-18 NOTE — PROGRESS NOTES
Occupational Therapy    Chart review, attempted to see pt for follow up treatment this date; pt sitting up in chair c/o nausea & vommiting, declined to participate in any bathroom /functional mobility or light UE ADL's at this time.     Tiffanie Stout

## 2020-09-18 NOTE — CARE COORDINATION
DEBBIE segovia. Patient continues with N&V and abdominal distension. Remains NPO. Scheduled for abdominal CT later today.     Manuel Wellington RN

## 2020-09-18 NOTE — PROGRESS NOTES
VSS - afebrile. Pt is alert and oriented x 4 with no history of falls. Assessment completed as charted. Bed is in lowest position with 2/4 bed rails raised, wheels locked and call light within reach - patient wearing non-skid socks and verbalizes understanding to call out for assistance. Pt c/o of nausea, antiemetic administered per MAR. Will continue to monitor.    Vitals:    09/17/20 2345   BP: (!) 121/49   Pulse: 110   Resp: 24   Temp: 98.4 °F (36.9 °C)   SpO2: 91%

## 2020-09-18 NOTE — PROGRESS NOTES
[facility]   Daily Progress Note    Admit Date:  9/11/2020  HPI:    Chief Complaint   Patient presents with    Abdominal Pain     patient called EMS for RLQ abdominal pain which started \"a couple days ago\", c/o worsening pain today.  Nausea      Presented with abdominal pain, + acute perforated appendicitis, s/p appy 9/11/20. Cardiology consulted for AFib with RVR. Treated with IV diltiazem and metoprolol, started on amiodarone    Hx of NICM, sCHF, AFib s/p prior ablations now treatment with rate control and anticoagulation. Subjective:  Mr. Bárbara Lorenzos up in chair, nausea and vomiting overnight and continued this am.  To have CT abdomen today. Objective:   BP (!) 155/82   Pulse 96   Temp 98.1 °F (36.7 °C) (Oral)   Resp 15   Ht 6' 5\" (1.956 m)   Wt (!) 411 lb 3.2 oz (186.5 kg)   SpO2 94%   BMI 48.76 kg/m²       Intake/Output Summary (Last 24 hours) at 9/18/2020 1124  Last data filed at 9/18/2020 1015  Gross per 24 hour   Intake 1520 ml   Output 795 ml   Net 725 ml     Wt Readings from Last 3 Encounters:   09/18/20 (!) 411 lb 3.2 oz (186.5 kg)   08/30/20 (!) 420 lb (190.5 kg)   03/10/20 (!) 411 lb 12.8 oz (186.8 kg)         ASSESSMENT:   1. Afib - rate with fair control, anticoagulation with warfarin, on po dilt 30 qid, metoprolol 5 mg IV and digoxin  2. Cardiomyopathy, non-ischemic - EF 30-35%, s/p ICD, on evidence-based beta blocker, ACEi and Patrick antagonist, and digoxin  3. CHF, chronic systolic - euvolemic on exam  4. HTN -  Now hypotensive  5. Appendicitis - s/p appy complicated by ileus      PLAN:  1. Hold torsemide and spironolactone   2. Will leave po metoprolol, digoxin and lisinopril po as ordered with parameters  3. Will order prn IV metoprolol for HR > 120 bpm  4. Stopped amiodarone  5.  Will follow along    AIDA Carver - CNP, 9/18/2020, 11:24 AM  [facility]   [phone]       Telemetry: AFib 100-110's, isolated pVC's  NYHA: III    Physical Exam:  General:  Awake, alert, retching  Skin:  Warm and dry  Neck:  JVP 8 cm upright  Chest:  Clear to auscultation   Cardiovascular:  Irreg Irreg, tachy, no m/g/r  Abdomen:  Distended/ firm, DESTIN drain in place, nontender, +bowel sounds  Extremities:  1+ pitting BLE edema, ACE wrap to LLE       Medications:    warfarin  10 mg Oral Once    metoprolol succinate  100 mg Oral Daily    lisinopril  10 mg Oral Nightly    warfarin (COUMADIN) daily dosing (placeholder)   Other RX Placeholder    meropenem  1 g Intravenous Q8H    docusate sodium  100 mg Oral Daily    insulin lispro  0-6 Units Subcutaneous TID WC    insulin lispro  0-3 Units Subcutaneous Nightly    allopurinol  100 mg Oral Daily    atorvastatin  10 mg Oral Daily    azelastine  1 spray Each Nostril BID    digoxin  125 mcg Oral Daily    gabapentin  300 mg Oral TID    levothyroxine  112 mcg Oral Daily    cetirizine  10 mg Oral Daily    montelukast  10 mg Oral Nightly    [Held by provider] spironolactone  25 mg Oral Daily    [Held by provider] torsemide  20 mg Oral Daily    sodium chloride flush  10 mL Intravenous 2 times per day    famotidine (PEPCID) injection  20 mg Intravenous BID      dextrose      sodium chloride 75 mL/hr at 09/17/20 0308       Lab Data: Lab results independently reviewed by myself 9/17/20   CBC:   Recent Labs     09/16/20  0635 09/17/20  0600 09/18/20  0555   WBC 7.5 6.8 6.2   HGB 11.7* 12.3* 12.3*    199 209     BMP:    Recent Labs     09/16/20  0635 09/17/20  0600 09/18/20  0555    132* 137   K 4.0 4.0 4.3   CO2 27 27 30   BUN 24* 22* 20   CREATININE 0.7* 0.8 0.8     INR:    Recent Labs     09/16/20  0635 09/17/20  0600 09/18/20  0555   INR 1.42* 1.46* 1.87*     BNP:  No results for input(s): PROBNP in the last 72 hours. Cardiac Enzymes: No results for input(s): TROPONINI in the last 72 hours.   Lipids: No results found for: TRIG, HDL, LDLCALC, LDLDIRECT    Cardiac Imaging:   ECHO 9/17/20:  Summary Technical difficult study due to body habitus. Left ventricular systolic function is low normal with a visually estimated ejection fraction of 50-55%. There is mild hypokinesis of the apical lateral wall. The left ventricle is normal in size with mild concentric hypertrophy. Indeterminate diastolic function due to atrial fibrillation. Mitral valve leaflets appear mildly thickened and calcified but open adequately. Mild mitral regurgitation. Aortic valve sclerosis with normal opening. No evidence of aortic valve regurgitation. The right atrium is enlarged. Right atrial area is 24.8 cm2. Echo 2018 at [facility]:    Study Conclusions  - Left ventricle: The cavity size was dilated. There was mild concentric hypertrophy. Systolic function was moderately reduced. The estimated ejection fraction was in the range of 30% to 35%.  Wall motion was normal; there were no regional wall motion abnormalities. Diastolic dysfunction; unable to assess left atrial pressure. - Aortic valve: Thickening, consistent with sclerosis. - Mitral valve: The annulus was mildly calcified. - Left atrium: The atrium was mildly dilated. - Right ventricle: The cavity size was mildly dilated. Wall thickness was normal. Pacer wire noted in right ventricle. - Systemic veins: Poorly visualized. - Inferior vena cava: The vessel was normal in size; the respirophasic diameter changes were in the normal range (>= 50%); findings are consistent with normal central venous pressure.

## 2020-09-18 NOTE — PROGRESS NOTES
Pharmacy to Dose Warfarin     Dx: Afib   Goal INR range 2-3   Home Warfarin dose: 10mg daily except 12.5mg on Wed     Date                 INR                  Warfarin  9/12                2.07                 10 mg  9/13                1.83                  Held /NPO  9/14                1.74                  Held /NPO    9/15                1.54                  12.5 mg (Rx consulted)  9/16                1.42                  12.5 mg   9/17                1.46                  12.5  9/18                1.87                  10 mg      Recommend Warfarin 10 mg tonight x1. Daily INR ordered. Rx will continue to manage therapy per Dr. Liss August consult order.   Dhara Reinoso, PharmD 9/18/2020 8:55 AM

## 2020-09-18 NOTE — PROGRESS NOTES
[facility] GENERAL SURGERY    Surgery Progress Note           POD # 7    PATIENT NAME: Horacio Harris     TODAY'S DATE: 9/18/2020    INTERVAL HISTORY:      Pt with recurrent bilious vomiting last night and this AM. Abd distended. He reports he is passing flatus still and thinks it is what he ate last night. OBJECTIVE:   VITALS:  /74   Pulse 106   Temp 98.4 °F (36.9 °C) (Oral)   Resp 18   Ht 6' 5\" (1.956 m)   Wt (!) 411 lb 3.2 oz (186.5 kg)   SpO2 91%   BMI 48.76 kg/m²     INTAKE/OUTPUT:    I/O last 3 completed shifts: In: 1520 [P.O.:1520]  Out: 755 [Urine:575; Drains:180]  I/O this shift:  In: -   Out: 40 [Drains:40]              CONSTITUTIONAL:  awake and alert  ABDOMEN: obese, distended, taught, gisel serous   INCISION: no drainage    Data:  CBC:   Recent Labs     09/16/20  0635 09/17/20  0600 09/18/20  0555   WBC 7.5 6.8 6.2   HGB 11.7* 12.3* 12.3*   HCT 34.5* 36.9* 36.9*    199 209     BMP:    Recent Labs     09/16/20  0635 09/17/20  0600 09/18/20  0555    132* 137   K 4.0 4.0 4.3   CL 99 96* 96*   CO2 27 27 30   BUN 24* 22* 20   CREATININE 0.7* 0.8 0.8   GLUCOSE 102* 109* 126*         ASSESSMENT AND PLAN:  60 y.o. male status post lap appy for perforated appendicitis    PO Ileus: with recurrent vomiting - CT scan ordered for today to further assess. Afib: cardiology managing  Activity: PT/OT  Morbid Obesity: BMI : 83.65 Complicating assessment and treatment. Placing patient at risk for multiple co-morbidities and complications. ID: continue with IV antibiotics given perforation      Electronically signed by AIDA Brand - CNP     Patient seen and agree with above. Having bms and flatus but also emesis. No fevers or leukocytosis. CT today to rule out abscess or sbo.     Aguilar Gan MD

## 2020-09-18 NOTE — PROGRESS NOTES
Gen/overall appearance: Not in acute distress. Alert. Head: Normocephalic, atraumatic  Eyes: EOMI, no scleral icterus  CVS: irreg irreg, Normal S1S2  Pulm: Clear to auscultation bilaterally. No crackles/wheezes  Gastrointestinal: Soft, nontender, obese, no guarding or rebound, lap incisions with overlying dressing  Extremities: trace LE edema. No erythema or warmth  Neuro: No gross focal deficits noted  Skin: Warm, dry    Labs:   Recent Labs     09/16/20  0635 09/17/20  0600 09/18/20  0555   WBC 7.5 6.8 6.2   HGB 11.7* 12.3* 12.3*   HCT 34.5* 36.9* 36.9*    199 209     Recent Labs     09/16/20  0635 09/17/20  0600 09/18/20  0555    132* 137   K 4.0 4.0 4.3   CL 99 96* 96*   CO2 27 27 30   BUN 24* 22* 20   CREATININE 0.7* 0.8 0.8   CALCIUM 9.0 9.2 9.4     No results for input(s): AST, ALT, BILIDIR, BILITOT, ALKPHOS in the last 72 hours. Recent Labs     09/16/20  0635 09/17/20  0600 09/18/20  0555   INR 1.42* 1.46* 1.87*     No results for input(s): CKTOTAL, TROPONINI in the last 72 hours. Assessment/Plan:    Active Hospital Problems    Diagnosis Date Noted    [facility] [K91.89, K56.7]     Chronic anticoagulation [Z79.01]     Non-ischemic cardiomyopathy [facility] [I42.8]     Chronic systolic congestive heart failure (HCC) [I50.22]     Atrial fibrillation (HCC) [I48.91]     Hyperlipidemia [E78.5]     Diabetes mellitus ([facility] [E11.9]     Hypertension [I10]     Perforated appendicitis [K35.32] 09/11/2020    Morbid obesity ([facility] [E66.01] 03/05/2020     Acute perforated appendicitis s/p appendectomy  Nausea and vomiting  -post op management per general surgery  -advance diet per GS recs  -on meropenem  -prn pain management  -repeat CT abdomen    Acute episode of hypoxia, nausea, chest pain consistent with acute post op hypoxic respiratory failure. Transient and now resolved.   Suspect 2/2 IV opiates  -negative troponin  -tele monitoring  -minimize opiates if possible    Acute on chronic Atrial fibrillation. Now with RVR. Improving  - started on amiodarone  - c/w po digoxin  - resume metoprolol as BP tolerates  - home AC with coumadin  - ECHO with preserved EF; mild hypokinesis of apical lat wall  - closely monitor and replace lytes  - cardio following     Morbid obesity  -With Body mass index is 82.9 kg/m². Complicating assessment and treatment. Placing patient at risk for multiple co-morbidities as well as early death and contributing to the patient's presentation. Counseled on weight loss     DM2 with peripheral neuropathy, controlled.   Hgb a1c 7.1  -ldssi  -poct ac/hs  -hypoglycemia protocol  -carb control diet when eating  -continue gabapentin     PMH of Essential HTN, hld  -continue home regimen    Diet: Diet NPO Effective Now Exceptions are: Ice Chips, Sips with Meds  Code Status: Full Code    Dispo - Michael Scherer MD

## 2020-09-19 ENCOUNTER — APPOINTMENT (OUTPATIENT)
Dept: GENERAL RADIOLOGY | Age: 65
DRG: 338 | End: 2020-09-19
Payer: MEDICARE

## 2020-09-19 LAB
ANION GAP SERPL CALCULATED.3IONS-SCNC: 10 MMOL/L (ref 3–16)
BUN BLDV-MCNC: 20 MG/DL (ref 7–20)
CALCIUM SERPL-MCNC: 9.4 MG/DL (ref 8.3–10.6)
CHLORIDE BLD-SCNC: 98 MMOL/L (ref 99–110)
CO2: 27 MMOL/L (ref 21–32)
CREAT SERPL-MCNC: 0.9 MG/DL (ref 0.8–1.3)
GFR AFRICAN AMERICAN: >60
GFR NON-AFRICAN AMERICAN: >60
GLUCOSE BLD-MCNC: 108 MG/DL (ref 70–99)
GLUCOSE BLD-MCNC: 88 MG/DL (ref 70–99)
GLUCOSE BLD-MCNC: 93 MG/DL (ref 70–99)
GLUCOSE BLD-MCNC: 94 MG/DL (ref 70–99)
GLUCOSE BLD-MCNC: 97 MG/DL (ref 70–99)
GLUCOSE BLD-MCNC: 99 MG/DL (ref 70–99)
HCT VFR BLD CALC: 37.8 % (ref 40.5–52.5)
HEMOGLOBIN: 12.5 G/DL (ref 13.5–17.5)
INR BLD: 2.49 (ref 0.86–1.14)
MCH RBC QN AUTO: 30.2 PG (ref 26–34)
MCHC RBC AUTO-ENTMCNC: 33 G/DL (ref 31–36)
MCV RBC AUTO: 91.5 FL (ref 80–100)
PDW BLD-RTO: 14.7 % (ref 12.4–15.4)
PERFORMED ON: ABNORMAL
PERFORMED ON: NORMAL
PLATELET # BLD: 184 K/UL (ref 135–450)
PMV BLD AUTO: 8.6 FL (ref 5–10.5)
POTASSIUM SERPL-SCNC: 4.2 MMOL/L (ref 3.5–5.1)
PROTHROMBIN TIME: 29.1 SEC (ref 10–13.2)
RBC # BLD: 4.13 M/UL (ref 4.2–5.9)
SODIUM BLD-SCNC: 135 MMOL/L (ref 136–145)
WBC # BLD: 6.2 K/UL (ref 4–11)

## 2020-09-19 PROCEDURE — 85027 COMPLETE CBC AUTOMATED: CPT

## 2020-09-19 PROCEDURE — 80048 BASIC METABOLIC PNL TOTAL CA: CPT

## 2020-09-19 PROCEDURE — 6370000000 HC RX 637 (ALT 250 FOR IP): Performed by: SURGERY

## 2020-09-19 PROCEDURE — 74022 RADEX COMPL AQT ABD SERIES: CPT

## 2020-09-19 PROCEDURE — 2580000003 HC RX 258: Performed by: SURGERY

## 2020-09-19 PROCEDURE — APPSS45 APP SPLIT SHARED TIME 31-45 MINUTES: Performed by: CLINICAL NURSE SPECIALIST

## 2020-09-19 PROCEDURE — 2500000003 HC RX 250 WO HCPCS: Performed by: SURGERY

## 2020-09-19 PROCEDURE — 99024 POSTOP FOLLOW-UP VISIT: CPT | Performed by: SURGERY

## 2020-09-19 PROCEDURE — 2580000003 HC RX 258: Performed by: INTERNAL MEDICINE

## 2020-09-19 PROCEDURE — 6360000002 HC RX W HCPCS: Performed by: INTERNAL MEDICINE

## 2020-09-19 PROCEDURE — 1200000000 HC SEMI PRIVATE

## 2020-09-19 PROCEDURE — 6370000000 HC RX 637 (ALT 250 FOR IP): Performed by: INTERNAL MEDICINE

## 2020-09-19 PROCEDURE — 6370000000 HC RX 637 (ALT 250 FOR IP): Performed by: NURSE PRACTITIONER

## 2020-09-19 PROCEDURE — 85610 PROTHROMBIN TIME: CPT

## 2020-09-19 PROCEDURE — 99232 SBSQ HOSP IP/OBS MODERATE 35: CPT | Performed by: NURSE PRACTITIONER

## 2020-09-19 RX ORDER — NICOTINE POLACRILEX 4 MG
15 LOZENGE BUCCAL PRN
Status: DISCONTINUED | OUTPATIENT
Start: 2020-09-19 | End: 2020-09-25 | Stop reason: HOSPADM

## 2020-09-19 RX ORDER — DEXTROSE MONOHYDRATE 50 MG/ML
100 INJECTION, SOLUTION INTRAVENOUS PRN
Status: DISCONTINUED | OUTPATIENT
Start: 2020-09-19 | End: 2020-09-19 | Stop reason: SDUPTHER

## 2020-09-19 RX ORDER — DEXTROSE MONOHYDRATE 25 G/50ML
12.5 INJECTION, SOLUTION INTRAVENOUS PRN
Status: DISCONTINUED | OUTPATIENT
Start: 2020-09-19 | End: 2020-09-25 | Stop reason: HOSPADM

## 2020-09-19 RX ADMIN — WARFARIN SODIUM 7.5 MG: 2.5 TABLET ORAL at 17:44

## 2020-09-19 RX ADMIN — SODIUM CHLORIDE: 9 INJECTION, SOLUTION INTRAVENOUS at 09:24

## 2020-09-19 RX ADMIN — SODIUM CHLORIDE: 9 INJECTION, SOLUTION INTRAVENOUS at 21:24

## 2020-09-19 RX ADMIN — MEROPENEM 1 G: 1 INJECTION, POWDER, FOR SOLUTION INTRAVENOUS at 15:50

## 2020-09-19 RX ADMIN — FAMOTIDINE 20 MG: 10 INJECTION INTRAVENOUS at 09:10

## 2020-09-19 RX ADMIN — DIGOXIN 250 MCG: 125 TABLET ORAL at 09:11

## 2020-09-19 RX ADMIN — AZELASTINE HYDROCHLORIDE 1 SPRAY: 137 SPRAY, METERED NASAL at 09:10

## 2020-09-19 RX ADMIN — Medication 10 ML: at 09:11

## 2020-09-19 RX ADMIN — MEROPENEM 1 G: 1 INJECTION, POWDER, FOR SOLUTION INTRAVENOUS at 00:47

## 2020-09-19 RX ADMIN — LEVOTHYROXINE SODIUM 112 MCG: 0.11 TABLET ORAL at 09:17

## 2020-09-19 RX ADMIN — FAMOTIDINE 20 MG: 10 INJECTION INTRAVENOUS at 21:21

## 2020-09-19 RX ADMIN — MEROPENEM 1 G: 1 INJECTION, POWDER, FOR SOLUTION INTRAVENOUS at 09:20

## 2020-09-19 ASSESSMENT — PAIN SCALES - GENERAL
PAINLEVEL_OUTOF10: 0

## 2020-09-19 NOTE — PROGRESS NOTES
NG suction temporarily clamped for transport to xray and for PO administration of morning medications; without nausea, vomiting, bloating. Replaced NG suction with no output observed; order rec'ed to clamp NG tube; 2/4 hours; replace for symptoms. Clamped at this time, will monitor.

## 2020-09-19 NOTE — PROGRESS NOTES
[facility] GENERAL SURGERY    Surgery Progress Note           POD # 8    PATIENT NAME: Armen Linton     TODAY'S DATE: 9/19/2020    INTERVAL HISTORY:      Up in chair this AM - with > 3000ml ngt output since insertion yesterday afternoon. Pt reports feeling much better. He also reports continued BMs that are now becoming more formed, and passing flatus. OBJECTIVE:   VITALS:  /84   Pulse 97   Temp 98.1 °F (36.7 °C) (Oral)   Resp 18   Ht 6' 5\" (1.956 m)   Wt (!) 411 lb 3.2 oz (186.5 kg)   SpO2 96%   BMI 48.76 kg/m²     INTAKE/OUTPUT:    I/O last 3 completed shifts: In: 1000 [P.O.:1000]  Out: 7297 [Urine:900; Emesis/NG output:3150; Drains:195; Stool:1100]  No intake/output data recorded. CONSTITUTIONAL:  awake and alert  ABDOMEN: obese, less distended, appropriately tender, gisel serous. INCISION: no drainage    Data:  CBC:   Recent Labs     09/17/20  0600 09/18/20  0555 09/19/20  0430   WBC 6.8 6.2 6.2   HGB 12.3* 12.3* 12.5*   HCT 36.9* 36.9* 37.8*    209 184     BMP:    Recent Labs     09/17/20  0600 09/18/20  0555 09/19/20  0430   * 137 135*   K 4.0 4.3 4.2   CL 96* 96* 98*   CO2 27 30 27   BUN 22* 20 20   CREATININE 0.8 0.8 0.9   GLUCOSE 109* 126* 99         ASSESSMENT AND PLAN:  60 y.o. male status post lap appy for perforated appendicitis    PO Ileus vs sbo: AXR pending completion today. Continue with ngt to suction. Pt continues to have BMs, flatus. Nutrition: pt on verge of needing PICC line and TPN  Afib: cardiology managing  Activity: PT/OT  Morbid Obesity: BMI : 44.45 Complicating assessment and treatment. Placing patient at risk for multiple co-morbidities and complications. ID: continue with IV antibiotics given perforation      Electronically signed by AIDA Segura - CNP     Surgery Staff    I have examined this patient and read and agree with the note by Heidi Eli CNP from today.   Unusual to have ileus/SBO picture while having frequent flatus/BMs. - will trial clamping NGT 2/4 hours today   - if he does not tolerated this, or fails current conservative therapy, may need to add TPN support.     GIL [facility] MOHR

## 2020-09-19 NOTE — PROGRESS NOTES
Hospitalist Progress Note      PCP: Bushra Castillo    Date of Admission: 9/11/2020    Chief Complaint: hypoxia      Subjective:   Nausea is improving  Denies abdominal pain and emesis. BMs more formed overnight. CT abd with high grade SBO yesterday. NG placed last night.   HR controlled    Medications:  Reviewed    Infusion Medications    dextrose      sodium chloride 75 mL/hr at 09/19/20 3995     Scheduled Medications    warfarin  7.5 mg Oral Once    digoxin  250 mcg Oral Daily    metoprolol succinate  100 mg Oral Daily    lisinopril  10 mg Oral Nightly    warfarin (COUMADIN) daily dosing (placeholder)   Other RX Placeholder    meropenem  1 g Intravenous Q8H    docusate sodium  100 mg Oral Daily    insulin lispro  0-6 Units Subcutaneous TID WC    insulin lispro  0-3 Units Subcutaneous Nightly    allopurinol  100 mg Oral Daily    atorvastatin  10 mg Oral Daily    azelastine  1 spray Each Nostril BID    gabapentin  300 mg Oral TID    levothyroxine  112 mcg Oral Daily    cetirizine  10 mg Oral Daily    montelukast  10 mg Oral Nightly    [Held by provider] spironolactone  25 mg Oral Daily    [Held by provider] torsemide  20 mg Oral Daily    sodium chloride flush  10 mL Intravenous 2 times per day    famotidine (PEPCID) injection  20 mg Intravenous BID     PRN Meds: glucose, dextrose, metoprolol, calcium carbonate, perflutren lipid microspheres, prochlorperazine, naloxone, glucagon (rDNA), dextrose, HYDROcodone 5 mg - acetaminophen **OR** HYDROcodone 5 mg - acetaminophen, morphine, ondansetron, clonazePAM, sodium chloride flush, promethazine **OR** ondansetron, HYDROmorphone, acetaminophen      Intake/Output Summary (Last 24 hours) at 9/19/2020 1332  Last data filed at 9/19/2020 0415  Gross per 24 hour   Intake 0 ml   Output 5305 ml   Net -5305 ml       Exam:    /68   Pulse 95   Temp 97.6 °F (36.4 °C) (Oral)   Resp 18   Ht 6' 5\" (1.956 m)   Wt (!) 411 lb 3.2 oz (186.5 kg)   SpO2 95%   BMI 48.76 kg/m²     Gen/overall appearance: Not in acute distress. Alert. Head: Normocephalic, atraumatic  ENT: NG in  Eyes: EOMI, no scleral icterus  CVS: irreg irreg, Normal S1S2  Pulm: Clear to auscultation bilaterally. No crackles/wheezes  Gastrointestinal: Soft, nontender, obese, no guarding or rebound, lap incisions c/d/i  Extremities: trace LE edema. No erythema or warmth  Neuro: No gross focal deficits noted  Skin: Warm, dry    Labs:   Recent Labs     09/17/20  0600 09/18/20  0555 09/19/20  0430   WBC 6.8 6.2 6.2   HGB 12.3* 12.3* 12.5*   HCT 36.9* 36.9* 37.8*    209 184     Recent Labs     09/17/20  0600 09/18/20  0555 09/19/20  0430   * 137 135*   K 4.0 4.3 4.2   CL 96* 96* 98*   CO2 27 30 27   BUN 22* 20 20   CREATININE 0.8 0.8 0.9   CALCIUM 9.2 9.4 9.4     No results for input(s): AST, ALT, BILIDIR, BILITOT, ALKPHOS in the last 72 hours. Recent Labs     09/17/20 0600 09/18/20  0555 09/19/20  0430   INR 1.46* 1.87* 2.49*     No results for input(s): CKTOTAL, TROPONINI in the last 72 hours. Assessment/Plan:    Active Hospital Problems    Diagnosis Date Noted    Ileus following gastrointestinal surgery ([facility] [address] [K91.89, K56.7]     Chronic anticoagulation [Z79.01]     Non-ischemic cardiomyopathy ([facility] [address] [I42.8]     Chronic systolic congestive heart failure (HCC) [I50.22]     Atrial fibrillation (HCC) [I48.91]     Hyperlipidemia [E78.5]     Diabetes mellitus ([facility] [address] [facility] [E11.9]     Hypertension [I10]     Perforated appendicitis [K35.32] 09/11/2020    Morbid obesity ([facility] [E66.01] 03/05/2020       Acute perforated appendicitis s/p appendectomy  High grade small bowel obstruction  -post op management per general surgery  -NG placed  -diet per GS recs  -on meropenem  -prn pain management  -repeat CT abdomen and KUB with high grade SBO    Acute episode of hypoxia, nausea, chest pain consistent with acute post op hypoxic respiratory failure. Transient and now resolved.   Suspect 2/2 IV opiates  -negative troponin  -tele monitoring  -minimize opiates if possible    Acute on chronic Atrial fibrillation. Now with RVR. Improving  - started on amiodarone  - c/w po digoxin  - resume metoprolol  - home AC with coumadin  - ECHO with preserved EF; mild hypokinesis of apical lat wall  - closely monitor and replace lytes  - cardio following     Morbid obesity  -With Body mass index is 05.6 kg/m². Complicating assessment and treatment. Placing patient at risk for multiple co-morbidities as well as early death and contributing to the patient's presentation. Counseled on weight loss     DM2 with peripheral neuropathy, controlled.   Hgb a1c 7.1  -ldssi  -poct ac/hs  -hypoglycemia protocol  -carb control diet when eating  -continue gabapentin     PMH of Essential HTN, hld  -continue home regimen    Diet: Diet NPO Effective Now Exceptions are: Ice Chips, Sips with Meds, Popsicles  Code Status: Full Code    Dispo - Ole Allen MD

## 2020-09-19 NOTE — PLAN OF CARE
Assessment completed, VSS, afebrile. Pt denies pain/nausea; no episodes of emesis. Pt with multiple episodes of loose stool. NG tube suction remains clamped at this time; without symptoms or complaints. Pt tolerated one popsicle as of time of note. Fall risk protocol in place. Will monitor.

## 2020-09-19 NOTE — PROGRESS NOTES
[address]   Daily Progress Note    Admit Date:  9/11/2020  HPI:    Chief Complaint   Patient presents with    Abdominal Pain     patient called EMS for RLQ abdominal pain which started \"a couple days ago\", c/o worsening pain today.  Nausea      Presented with abdominal pain, + acute perforated appendicitis, s/p appy 9/11/20. Cardiology consulted for AFib with RVR. Treated with IV diltiazem and metoprolol, started on amiodarone    Hx of NICM, sCHF, AFib s/p prior ablations now treatment with rate control and anticoagulation. Subjective:  Mr. Myriam Goldstein sitting up in chair, nurse dressing BLE. Denies chest pain, increase in shortness of breath, palpitations and dizziness. NG tube to suction. Stated he is feeling much better today. Objective:   /70   Pulse 91   Temp 97.8 °F (36.6 °C) (Oral)   Resp 18   Ht 6' 5\" (1.956 m)   Wt (!) 411 lb 3.2 oz (186.5 kg)   SpO2 95%   BMI 48.76 kg/m²       Intake/Output Summary (Last 24 hours) at 9/19/2020 1545  Last data filed at 9/19/2020 1445  Gross per 24 hour   Intake 75 ml   Output 5205 ml   Net -5130 ml     Wt Readings from Last 3 Encounters:   09/18/20 (!) 411 lb 3.2 oz (186.5 kg)   08/30/20 (!) 420 lb (190.5 kg)   03/10/20 (!) 411 lb 12.8 oz (186.8 kg)         ASSESSMENT:   1. Afib - rate with fair control, anticoagulation with warfarin, on digoxin, Toprol, and PRN IV metoprolol   2. Cardiomyopathy, non-ischemic - EF 30-35%, s/p ICD, EF improved 50-55% on echo 9/17/2020   -on evidence-based beta blocker, ACEi and Patrick antagonist (on hold), and digoxin  3. CHF, chronic systolic - euvolemic on exam  -417-->411  4. HTN -now hypotensive at times  5. Appendicitis - s/p appy 0/24/9378 complicated by ileus- now with NG tube in place      PLAN:  1. Continue to hold torsemide and spironolactone   2. Will leave po metoprolol, digoxin and lisinopril po as ordered with parameters  3. Will order prn IV metoprolol for HR > 120 bpm  4.  Stopped amiodarone  5.  Continue Coumadin, pharmacy to dose    Demi Nuñez, APRN - CNP, 9/19/2020, 3:45 PM  [facility]   [phone]       Telemetry: afib  with isolated PVCs      Physical Exam:  General:  Awake, alert, obese, NAD  Skin:  Warm and dry  Neck:  JVP 8 cm upright  Chest:  Clear to auscultation   Cardiovascular:  Irreg Irreg,no m/g/r  Abdomen:  Distended/ firm, DESTIN drain in place, nontender, +bowel sounds  Extremities:  Trace BLE edema, ACE wrap to LLE       Medications:    warfarin  7.5 mg Oral Once    digoxin  250 mcg Oral Daily    metoprolol succinate  100 mg Oral Daily    lisinopril  10 mg Oral Nightly    warfarin (COUMADIN) daily dosing (placeholder)   Other RX Placeholder    meropenem  1 g Intravenous Q8H    docusate sodium  100 mg Oral Daily    insulin lispro  0-6 Units Subcutaneous TID WC    insulin lispro  0-3 Units Subcutaneous Nightly    allopurinol  100 mg Oral Daily    atorvastatin  10 mg Oral Daily    azelastine  1 spray Each Nostril BID    gabapentin  300 mg Oral TID    levothyroxine  112 mcg Oral Daily    cetirizine  10 mg Oral Daily    montelukast  10 mg Oral Nightly    [Held by provider] spironolactone  25 mg Oral Daily    [Held by provider] torsemide  20 mg Oral Daily    sodium chloride flush  10 mL Intravenous 2 times per day    famotidine (PEPCID) injection  20 mg Intravenous BID      dextrose      sodium chloride 75 mL/hr at 09/19/20 0924       Lab Data: Lab results independently reviewed by myself 9/17/20   CBC:   Recent Labs     09/17/20  0600 09/18/20  0555 09/19/20  0430   WBC 6.8 6.2 6.2   HGB 12.3* 12.3* 12.5*    209 184     BMP:    Recent Labs     09/17/20  0600 09/18/20  0555 09/19/20  0430   * 137 135*   K 4.0 4.3 4.2   CO2 27 30 27   BUN 22* 20 20   CREATININE 0.8 0.8 0.9     INR:    Recent Labs     09/17/20  0600 09/18/20  0555 09/19/20  0430   INR 1.46* 1.87* 2.49*     BNP:  No results for input(s): PROBNP in the last 72 hours. Cardiac Enzymes: No results for input(s): TROPONINI in the last 72 hours. Lipids: No results found for: TRIG, HDL, LDLCALC, LDLDIRECT    Cardiac Imaging:   ECHO 9/17/20:  Summary   Technical difficult study due to body habitus. Left ventricular systolic function is low normal with a visually estimated ejection fraction of 50-55%. There is mild hypokinesis of the apical lateral wall. The left ventricle is normal in size with mild concentric hypertrophy. Indeterminate diastolic function due to atrial fibrillation. Mitral valve leaflets appear mildly thickened and calcified but open adequately. Mild mitral regurgitation. Aortic valve sclerosis with normal opening. No evidence of aortic valve regurgitation. The right atrium is enlarged. Right atrial area is 24.8 cm2. Echo 2018 at [facility]:    Study Conclusions  - Left ventricle: The cavity size was dilated. There was mild concentric hypertrophy. Systolic function was moderately reduced. The estimated ejection fraction was in the range of 30% to 35%.  Wall motion was normal; there were no regional wall motion abnormalities. Diastolic dysfunction; unable to assess left atrial pressure. - Aortic valve: Thickening, consistent with sclerosis. - Mitral valve: The annulus was mildly calcified. - Left atrium: The atrium was mildly dilated. - Right ventricle: The cavity size was mildly dilated. Wall thickness was normal. Pacer wire noted in right ventricle. - Systemic veins: Poorly visualized. - Inferior vena cava: The vessel was normal in size; the respirophasic diameter changes were in the normal range (>= 50%); findings are consistent with normal central venous pressure.

## 2020-09-19 NOTE — PROGRESS NOTES
Pt's NG tube reconnected to suction at 1500. Pt tolerated PO intake without issue with NG clamped. Denies nausea, bloating, pain. No output from NG tube approx 50 min after reattachment. Pt resting comfortably up in chair. Drsg change to LLE completed at this time.

## 2020-09-19 NOTE — PROGRESS NOTES
Pharmacy to Dose Warfarin     Dx: Afib   Goal INR range 2-3   Home Warfarin dose: 10mg daily except 12.5mg on Wed     Date                 INR                  Warfarin  9/12                2.07                 10 mg  9/13                1.83                  LRNG /NPO  9/14                1.74                  QVOP /NPO    9/15                1.54                  12.5 mg (Rx consulted)  9/16                1.42                  12.5 mg   9/17                1.46                  12.5  9/18                1.87                  10 mg   9/19                2.49                   7.5 mg     Recommend Warfarin 7.5 mg tonight x1.  Daily INR ordered.  Rx will continue to manage therapy per Dr. Valerio Gardner consult order.     Mary Morales, [facility]

## 2020-09-20 ENCOUNTER — APPOINTMENT (OUTPATIENT)
Dept: GENERAL RADIOLOGY | Age: 65
DRG: 338 | End: 2020-09-20
Payer: MEDICARE

## 2020-09-20 LAB
ANION GAP SERPL CALCULATED.3IONS-SCNC: 9 MMOL/L (ref 3–16)
BUN BLDV-MCNC: 19 MG/DL (ref 7–20)
CALCIUM SERPL-MCNC: 8.9 MG/DL (ref 8.3–10.6)
CHLORIDE BLD-SCNC: 101 MMOL/L (ref 99–110)
CO2: 26 MMOL/L (ref 21–32)
CREAT SERPL-MCNC: 0.7 MG/DL (ref 0.8–1.3)
GFR AFRICAN AMERICAN: >60
GFR NON-AFRICAN AMERICAN: >60
GLUCOSE BLD-MCNC: 86 MG/DL (ref 70–99)
GLUCOSE BLD-MCNC: 88 MG/DL (ref 70–99)
GLUCOSE BLD-MCNC: 90 MG/DL (ref 70–99)
GLUCOSE BLD-MCNC: 92 MG/DL (ref 70–99)
GLUCOSE BLD-MCNC: 96 MG/DL (ref 70–99)
GLUCOSE BLD-MCNC: 96 MG/DL (ref 70–99)
HCT VFR BLD CALC: 34.7 % (ref 40.5–52.5)
HEMOGLOBIN: 11.5 G/DL (ref 13.5–17.5)
INR BLD: 2.76 (ref 0.86–1.14)
MCH RBC QN AUTO: 29.9 PG (ref 26–34)
MCHC RBC AUTO-ENTMCNC: 33.1 G/DL (ref 31–36)
MCV RBC AUTO: 90.4 FL (ref 80–100)
PDW BLD-RTO: 14.5 % (ref 12.4–15.4)
PERFORMED ON: NORMAL
PLATELET # BLD: 196 K/UL (ref 135–450)
PMV BLD AUTO: 9.1 FL (ref 5–10.5)
POTASSIUM SERPL-SCNC: 4 MMOL/L (ref 3.5–5.1)
PROTHROMBIN TIME: 32.3 SEC (ref 10–13.2)
RBC # BLD: 3.84 M/UL (ref 4.2–5.9)
SODIUM BLD-SCNC: 136 MMOL/L (ref 136–145)
WBC # BLD: 7 K/UL (ref 4–11)

## 2020-09-20 PROCEDURE — 6370000000 HC RX 637 (ALT 250 FOR IP): Performed by: NURSE PRACTITIONER

## 2020-09-20 PROCEDURE — 80048 BASIC METABOLIC PNL TOTAL CA: CPT

## 2020-09-20 PROCEDURE — 6370000000 HC RX 637 (ALT 250 FOR IP): Performed by: INTERNAL MEDICINE

## 2020-09-20 PROCEDURE — 85610 PROTHROMBIN TIME: CPT

## 2020-09-20 PROCEDURE — 99024 POSTOP FOLLOW-UP VISIT: CPT | Performed by: SURGERY

## 2020-09-20 PROCEDURE — 2500000003 HC RX 250 WO HCPCS: Performed by: SURGERY

## 2020-09-20 PROCEDURE — APPSS45 APP SPLIT SHARED TIME 31-45 MINUTES: Performed by: CLINICAL NURSE SPECIALIST

## 2020-09-20 PROCEDURE — 2580000003 HC RX 258: Performed by: SURGERY

## 2020-09-20 PROCEDURE — 74019 RADEX ABDOMEN 2 VIEWS: CPT

## 2020-09-20 PROCEDURE — 6360000002 HC RX W HCPCS: Performed by: INTERNAL MEDICINE

## 2020-09-20 PROCEDURE — 2580000003 HC RX 258: Performed by: INTERNAL MEDICINE

## 2020-09-20 PROCEDURE — 1200000000 HC SEMI PRIVATE

## 2020-09-20 PROCEDURE — 74250 X-RAY XM SM INT 1CNTRST STD: CPT

## 2020-09-20 PROCEDURE — 6370000000 HC RX 637 (ALT 250 FOR IP): Performed by: SURGERY

## 2020-09-20 PROCEDURE — 85027 COMPLETE CBC AUTOMATED: CPT

## 2020-09-20 PROCEDURE — 6370000000 HC RX 637 (ALT 250 FOR IP)

## 2020-09-20 RX ORDER — METOPROLOL SUCCINATE 50 MG/1
50 TABLET, EXTENDED RELEASE ORAL DAILY
Status: DISCONTINUED | OUTPATIENT
Start: 2020-09-20 | End: 2020-09-25 | Stop reason: HOSPADM

## 2020-09-20 RX ADMIN — LEVOTHYROXINE SODIUM 112 MCG: 0.11 TABLET ORAL at 07:54

## 2020-09-20 RX ADMIN — Medication 10 ML: at 07:54

## 2020-09-20 RX ADMIN — METOPROLOL SUCCINATE 50 MG: 50 TABLET, EXTENDED RELEASE ORAL at 12:19

## 2020-09-20 RX ADMIN — DIGOXIN 250 MCG: 125 TABLET ORAL at 07:53

## 2020-09-20 RX ADMIN — WARFARIN SODIUM 7.5 MG: 2.5 TABLET ORAL at 17:29

## 2020-09-20 RX ADMIN — MEROPENEM 1 G: 1 INJECTION, POWDER, FOR SOLUTION INTRAVENOUS at 07:54

## 2020-09-20 RX ADMIN — Medication: at 16:35

## 2020-09-20 RX ADMIN — MEROPENEM 1 G: 1 INJECTION, POWDER, FOR SOLUTION INTRAVENOUS at 00:21

## 2020-09-20 RX ADMIN — Medication 10 ML: at 21:09

## 2020-09-20 RX ADMIN — MEROPENEM 1 G: 1 INJECTION, POWDER, FOR SOLUTION INTRAVENOUS at 16:35

## 2020-09-20 RX ADMIN — MEROPENEM 1 G: 1 INJECTION, POWDER, FOR SOLUTION INTRAVENOUS at 23:33

## 2020-09-20 RX ADMIN — FAMOTIDINE 20 MG: 10 INJECTION INTRAVENOUS at 07:53

## 2020-09-20 RX ADMIN — FAMOTIDINE 20 MG: 10 INJECTION INTRAVENOUS at 21:10

## 2020-09-20 ASSESSMENT — PAIN SCALES - GENERAL
PAINLEVEL_OUTOF10: 0

## 2020-09-20 NOTE — PLAN OF CARE
Assessment completed, VSS, sp02 97% on RA, afebrile. Pt is a&0x4, dressing change to LLE completed. Pt denies pain/nausea/excessive bloating; NG remains clamped as of time of note for imaging study. Pt tolerating ice chips, popsicles, and PO meds without issue. Fall risk protocol remain in place as per score, free from falls. Call light and personal belongings within reach.

## 2020-09-20 NOTE — PROGRESS NOTES
[facility]   Daily Progress Note    Admit Date:  9/11/2020  HPI:    Chief Complaint   Patient presents with    Abdominal Pain     patient called EMS for RLQ abdominal pain which started \"a couple days ago\", c/o worsening pain today.  Nausea      Presented with abdominal pain, + acute perforated appendicitis, s/p appy 9/11/20. Cardiology consulted for AFib with RVR. Treated with IV diltiazem and metoprolol, started on amiodarone which was discontinued on 9/18. Hx of NICM, sCHF, AFib s/p prior ablations now treatment with rate control and anticoagulation. Subjective:  Mr. Ozzie Ceja sitting up in chair. Remains with NG. Stated he is feeling better today. Denies chest pain, shortness of breath, palpitations and dizziness. Pt stated he is not going to take the Toprol if blood pressure is in the 90s. Stated a little stressed today with home situations. Objective:   /61   Pulse 104   Temp 97.9 °F (36.6 °C) (Oral)   Resp 20   Ht 6' 5\" (1.956 m)   Wt (!) 403 lb 1.6 oz (182.8 kg)   SpO2 95%   BMI 47.80 kg/m²       Intake/Output Summary (Last 24 hours) at 9/20/2020 0743  Last data filed at 9/20/2020 0403  Gross per 24 hour   Intake 100 ml   Output 445 ml   Net -345 ml     Wt Readings from Last 3 Encounters:   09/20/20 (!) 403 lb 1.6 oz (182.8 kg)   08/30/20 (!) 420 lb (190.5 kg)   03/10/20 (!) 411 lb 12.8 oz (186.8 kg)         ASSESSMENT:   1. Afib - continues with fair control of HR, anticoagulation with warfarin, on digoxin, Toprol, and PRN IV metoprolol   2. Cardiomyopathy, non-ischemic - EF 30-35%, s/p ICD, EF improved 50-55% on echo 9/17/2020   -on evidence-based beta blocker, ACEi and Patrick antagonist (on hold), and digoxin  3. CHF, chronic systolic - euvolemic on exam- holding diuretics at this time Pt NPO with NG tube in place  - weight 417-->411-->403  4. HTN -hypotension has improved, remains soft at times   5.  Appendicitis - s/p appy 3/07/2977 complicated by ileus- now with NG tube in place  6. Morbid obesity: Daily weights, low sodium diet, 2000 ml fluid restriction, if you gain 3 lbs in a day 5 lbs in a week, increased swelling, or increased shortness of breath please call the office. PLAN:   1. Continue to hold torsemide and spironolactone at this time, monitor closely  2. Decrease Toprol to 50 mg daily- due to lower blood pressure and patient doesn't want to take the 100 mg- will try 50 mg and titrate as blood pressure and HR allows. Monitor blood pressure, discussed with April RN   3. Will leave po digoxin and lisinopril (nightly) po as ordered with parameters  4. Will order prn IV metoprolol for HR > 120 bpm  5. Stopped amiodarone on 9/18  6. Continue Coumadin, pharmacy to dose  7.  Daily weights and strict I/O    Karyle Sprinkles, APRN - CNP, 9/20/2020, 7:43 AM  [facility]   [phone]       Telemetry: afib       Physical Exam:   General:  Awake, alert, obese, NAD  Skin:  Warm and dry  Neck:  JVP 8 cm upright  Nose: NG tube  Chest:  Clear to auscultation   Cardiovascular:  Irreg Irreg,no m/g/r  Abdomen:  Distended/ less firm today, DESTIN drain in place, nontender, +bowel sounds  Extremities:  Trace BLE edema, ACE wrap to LLE       Medications:    digoxin  250 mcg Oral Daily    metoprolol succinate  100 mg Oral Daily    lisinopril  10 mg Oral Nightly    warfarin (COUMADIN) daily dosing (placeholder)   Other RX Placeholder    meropenem  1 g Intravenous Q8H    docusate sodium  100 mg Oral Daily    insulin lispro  0-6 Units Subcutaneous TID WC    insulin lispro  0-3 Units Subcutaneous Nightly    allopurinol  100 mg Oral Daily    atorvastatin  10 mg Oral Daily    azelastine  1 spray Each Nostril BID    gabapentin  300 mg Oral TID    levothyroxine  112 mcg Oral Daily    cetirizine  10 mg Oral Daily    montelukast  10 mg Oral Nightly    [Held by provider] spironolactone  25 mg Oral Daily    [Held by provider] torsemide  20 mg Oral Daily    sodium chloride flush  10 mL Intravenous 2 times per day    famotidine (PEPCID) injection  20 mg Intravenous BID      dextrose      sodium chloride 75 mL/hr at 09/19/20 2124       Lab Data: Lab results independently reviewed by myself 9/20/20   CBC:   Recent Labs     09/18/20  0555 09/19/20  0430 09/20/20  0405   WBC 6.2 6.2 7.0   HGB 12.3* 12.5* 11.5*    184 196     BMP:    Recent Labs     09/18/20  0555 09/19/20  0430 09/20/20  0405    135* 136   K 4.3 4.2 4.0   CO2 30 27 26   BUN 20 20 19   CREATININE 0.8 0.9 0.7*     INR:    Recent Labs     09/18/20  0555 09/19/20  0430 09/20/20  0405   INR 1.87* 2.49* 2.76*     BNP:  No results for input(s): PROBNP in the last 72 hours. Cardiac Enzymes: No results for input(s): TROPONINI in the last 72 hours. Lipids: No results found for: TRIG, HDL, LDLCALC, LDLDIRECT    Cardiac Imaging:   ECHO 9/17/20:  Summary   Technical difficult study due to body habitus. Left ventricular systolic function is low normal with a visually estimated ejection fraction of 50-55%. There is mild hypokinesis of the apical lateral wall. The left ventricle is normal in size with mild concentric hypertrophy. Indeterminate diastolic function due to atrial fibrillation. Mitral valve leaflets appear mildly thickened and calcified but open adequately. Mild mitral regurgitation. Aortic valve sclerosis with normal opening. No evidence of aortic valve regurgitation. The right atrium is enlarged. Right atrial area is 24.8 cm2. Echo 2018 at [facility]:    Study Conclusions  - Left ventricle: The cavity size was dilated. There was mild concentric hypertrophy. Systolic function was moderately reduced. The estimated ejection fraction was in the range of 30% to 35%.  Wall motion was normal; there were no regional wall motion abnormalities. Diastolic dysfunction; unable to assess left atrial pressure. - Aortic valve: Thickening, consistent with sclerosis. - Mitral valve:  The annulus was mildly calcified. - Left atrium: The atrium was mildly dilated. - Right ventricle: The cavity size was mildly dilated. Wall thickness was normal. Pacer wire noted in right ventricle. - Systemic veins: Poorly visualized. - Inferior vena cava: The vessel was normal in size; the respirophasic diameter changes were in the normal range (>= 50%); findings are consistent with normal central venous pressure.

## 2020-09-20 NOTE — PROGRESS NOTES
[facility] GENERAL SURGERY    Surgery Progress Note           POD # 9    PATIENT NAME: Tayla Lucero     TODAY'S DATE: 9/20/2020    INTERVAL HISTORY:      Feeling well - up in chair. States he is passing a lot of flatus. Has tolerated ngt clamping. OBJECTIVE:   VITALS:  BP 93/63   Pulse 83   Temp 97.4 °F (36.3 °C) (Oral)   Resp 22   Ht 6' 5\" (1.956 m)   Wt (!) 403 lb 1.6 oz (182.8 kg)   SpO2 94%   BMI 47.80 kg/m²     INTAKE/OUTPUT:    I/O last 3 completed shifts: In: 100 [P.O.:100]  Out: 445 [Urine:400; Drains:45]  No intake/output data recorded. CONSTITUTIONAL:  awake and alert  ABDOMEN: obese, non distended, appropriately tender, gisel serous. INCISION: no drainage    Data:  CBC:   Recent Labs     09/18/20  0555 09/19/20  0430 09/20/20  0405   WBC 6.2 6.2 7.0   HGB 12.3* 12.5* 11.5*   HCT 36.9* 37.8* 34.7*    184 196     BMP:    Recent Labs     09/18/20  0555 09/19/20  0430 09/20/20  0405    135* 136   K 4.3 4.2 4.0   CL 96* 98* 101   CO2 30 27 26   BUN 20 20 19   CREATININE 0.8 0.9 0.7*   GLUCOSE 126* 99 90         ASSESSMENT AND PLAN:  60 y.o. male status post lap appy for perforated appendicitis    PO Ileus vs sbo: AXR pending completion today. Continue with ngt clamping - await AXR results. Nutrition: pt on verge of needing PICC line and TPN, however will see if able to restart diet in next 24 hours. Afib: cardiology managing  Activity: PT/OT  Morbid Obesity: BMI : 02.34 Complicating assessment and treatment. Placing patient at risk for multiple co-morbidities and complications. ID: continue with IV antibiotics given perforation      Electronically signed by AIDA Purvis - CNP     Surgery Staff    I have examined this patient and read and agree with the note by Harry Wood CNP from today. Will order SBFT to better clarify if an actual SBO persists. If none seen, will work towards removing the NGT again.     [facility]IL [facility] MOHR

## 2020-09-20 NOTE — PROGRESS NOTES
Pharmacy to Dose Warfarin     Dx:  Afib   Goal INR range 2-3   Home Warfarin dose: 10mg daily except 12.5mg on Wed     Date                 INR                  Warfarin  9/12                2.07                 10 mg  9/13                1.83                  WPQT /NPO  9/14                1.74                  EJPT /NPO    9/15                1.54                  12.5 mg (Rx consulted)  9/16                1.42                  12.5 mg   9/17                1.46                  12.5  9/18                1.87                  10 mg   9/19                2.49                   7.5 mg  9/20                2.76                   7.5 mg     Recommend Warfarin 7.5 mg tonight x1.  Daily INR ordered.  Rx will continue to manage therapy per Dr. Eulalio Santana consult order.  C/ Marj [address], [facility]

## 2020-09-20 NOTE — PROGRESS NOTES
Hospitalist Progress Note      PCP: Toni Soliz    Date of Admission: 9/11/2020    Chief Complaint: hypoxia      Subjective:  Patient says he feels well. No n/v.  Pain controlled. Says his last BM was 9/19 at 2AM, and he has been passing flatus since.        Medications:  Reviewed    Infusion Medications    dextrose      sodium chloride 75 mL/hr at 09/19/20 2124     Scheduled Medications    digoxin  250 mcg Oral Daily    metoprolol succinate  100 mg Oral Daily    lisinopril  10 mg Oral Nightly    warfarin (COUMADIN) daily dosing (placeholder)   Other RX Placeholder    meropenem  1 g Intravenous Q8H    docusate sodium  100 mg Oral Daily    insulin lispro  0-6 Units Subcutaneous TID WC    insulin lispro  0-3 Units Subcutaneous Nightly    allopurinol  100 mg Oral Daily    atorvastatin  10 mg Oral Daily    azelastine  1 spray Each Nostril BID    gabapentin  300 mg Oral TID    levothyroxine  112 mcg Oral Daily    cetirizine  10 mg Oral Daily    montelukast  10 mg Oral Nightly    [Held by provider] spironolactone  25 mg Oral Daily    [Held by provider] torsemide  20 mg Oral Daily    sodium chloride flush  10 mL Intravenous 2 times per day    famotidine (PEPCID) injection  20 mg Intravenous BID     PRN Meds: phenol, glucose, dextrose, metoprolol, calcium carbonate, perflutren lipid microspheres, prochlorperazine, naloxone, glucagon (rDNA), dextrose, HYDROcodone 5 mg - acetaminophen **OR** HYDROcodone 5 mg - acetaminophen, morphine, ondansetron, clonazePAM, sodium chloride flush, promethazine **OR** ondansetron, HYDROmorphone, acetaminophen      Intake/Output Summary (Last 24 hours) at 9/20/2020 1141  Last data filed at 9/20/2020 0830  Gross per 24 hour   Intake 100 ml   Output 745 ml   Net -645 ml       Exam:    /71   Pulse 95   Temp 97.3 °F (36.3 °C) (Oral)   Resp 20   Ht 6' 5\" (1.956 m)   Wt (!) 403 lb 1.6 oz (182.8 kg)   SpO2 96%   BMI 47.80 kg/m²     Gen/overall appearance: Not in acute distress. Alert. Head: Normocephalic, atraumatic  ENT: NG in  Eyes: EOMI, no scleral icterus  CVS: irreg irreg, Normal S1S2  Pulm: Clear to auscultation bilaterally. No crackles/wheezes  Gastrointestinal: Soft, nontender, obese, no guarding or rebound, lap incisions c/d/i, bowel sound present  Extremities: 1+ BLE edema. No erythema or warmth  Neuro: No gross focal deficits noted  Skin: Warm, dry    Labs:   Recent Labs     09/18/20  0555 09/19/20  0430 09/20/20  0405   WBC 6.2 6.2 7.0   HGB 12.3* 12.5* 11.5*   HCT 36.9* 37.8* 34.7*    184 196     Recent Labs     09/18/20  0555 09/19/20  0430 09/20/20  0405    135* 136   K 4.3 4.2 4.0   CL 96* 98* 101   CO2 30 27 26   BUN 20 20 19   CREATININE 0.8 0.9 0.7*   CALCIUM 9.4 9.4 8.9     No results for input(s): AST, ALT, BILIDIR, BILITOT, ALKPHOS in the last 72 hours. Recent Labs     09/18/20  0555 09/19/20  0430 09/20/20  0405   INR 1.87* 2.49* 2.76*     No results for input(s): Aly Shown in the last 72 hours. Assessment/Plan:    Active Hospital Problems    Diagnosis Date Noted    Ileus following gastrointestinal surgery ([facility] [address] [K91.89, K56.7]     Chronic anticoagulation [Z79.01]     Non-ischemic cardiomyopathy ([facility] [address] [I42.8]     Chronic systolic congestive heart failure (HCC) [I50.22]     Atrial fibrillation (HCC) [I48.91]     Hyperlipidemia [E78.5]     Diabetes mellitus ([facility] [address] [E11.9]     Hypertension [I10]     Perforated appendicitis [K35.32] 09/11/2020    Morbid obesity ([facility] [address] [E66.01] 03/05/2020       Acute perforated appendicitis s/p appendectomy  High grade small bowel obstruction  -post op management per general surgery  -NG placed  -diet per GS recs  -on meropenem  -prn pain management  -repeat CT abdomen and KUB with high grade SBO    Acute episode of hypoxia, nausea, chest pain consistent with acute post op hypoxic respiratory failure. Transient and now resolved.   Suspect 2/2 IV opiates  -negative troponin  -tele monitoring  -minimize opiates if possible    Acute on chronic Atrial fibrillation. Now with RVR. Improving  - started on amiodarone, then stopped per cards  - po digoxin  - resume metoprolol  [facility]  - ECHO with preserved EF; mild hypokinesis of apical lat wall  - closely monitor and replace lytes  - cardio following     Morbid obesity  -With Body mass index is 51.3 kg/m². Complicating assessment and treatment. Placing patient at risk for multiple co-morbidities as well as early death and contributing to the patient's presentation. Counseled on weight loss     DM2 with peripheral neuropathy, controlled. Hgb a1c 7.1  -ldssi  -poct ac/hs  -hypoglycemia protocol  -carb control diet when eating  -continue gabapentin     PMH of Essential HTN, hld  -continue home regimen      Diet: Diet NPO Effective Now Exceptions are: Ice Chips, Sips with Meds, Popsicles  Code Status: Full Code     PT/OT: iman ordered    Dispo - ultimately per surgery. Medically ready from my perspective when he is tolerating PO. He lives at home.       Ryland Ramos MD

## 2020-09-21 LAB
ANION GAP SERPL CALCULATED.3IONS-SCNC: 8 MMOL/L (ref 3–16)
BASOPHILS ABSOLUTE: 0 K/UL (ref 0–0.2)
BASOPHILS RELATIVE PERCENT: 0.8 %
BUN BLDV-MCNC: 16 MG/DL (ref 7–20)
CALCIUM SERPL-MCNC: 9.1 MG/DL (ref 8.3–10.6)
CHLORIDE BLD-SCNC: 103 MMOL/L (ref 99–110)
CO2: 27 MMOL/L (ref 21–32)
CREAT SERPL-MCNC: 0.7 MG/DL (ref 0.8–1.3)
EOSINOPHILS ABSOLUTE: 0.2 K/UL (ref 0–0.6)
EOSINOPHILS RELATIVE PERCENT: 2.7 %
GFR AFRICAN AMERICAN: >60
GFR NON-AFRICAN AMERICAN: >60
GLUCOSE BLD-MCNC: 106 MG/DL (ref 70–99)
GLUCOSE BLD-MCNC: 83 MG/DL (ref 70–99)
GLUCOSE BLD-MCNC: 87 MG/DL (ref 70–99)
GLUCOSE BLD-MCNC: 90 MG/DL (ref 70–99)
GLUCOSE BLD-MCNC: 96 MG/DL (ref 70–99)
HCT VFR BLD CALC: 35 % (ref 40.5–52.5)
HEMOGLOBIN: 11.8 G/DL (ref 13.5–17.5)
INR BLD: 2.69 (ref 0.86–1.14)
LYMPHOCYTES ABSOLUTE: 1.7 K/UL (ref 1–5.1)
LYMPHOCYTES RELATIVE PERCENT: 27.5 %
MCH RBC QN AUTO: 30.5 PG (ref 26–34)
MCHC RBC AUTO-ENTMCNC: 33.7 G/DL (ref 31–36)
MCV RBC AUTO: 90.5 FL (ref 80–100)
MONOCYTES ABSOLUTE: 0.5 K/UL (ref 0–1.3)
MONOCYTES RELATIVE PERCENT: 8.1 %
NEUTROPHILS ABSOLUTE: 3.8 K/UL (ref 1.7–7.7)
NEUTROPHILS RELATIVE PERCENT: 60.9 %
PDW BLD-RTO: 14.6 % (ref 12.4–15.4)
PERFORMED ON: ABNORMAL
PERFORMED ON: NORMAL
PLATELET # BLD: 172 K/UL (ref 135–450)
PMV BLD AUTO: 8.7 FL (ref 5–10.5)
POTASSIUM SERPL-SCNC: 3.9 MMOL/L (ref 3.5–5.1)
PROTHROMBIN TIME: 31.5 SEC (ref 10–13.2)
RBC # BLD: 3.87 M/UL (ref 4.2–5.9)
SODIUM BLD-SCNC: 138 MMOL/L (ref 136–145)
WBC # BLD: 6.2 K/UL (ref 4–11)

## 2020-09-21 PROCEDURE — 1200000000 HC SEMI PRIVATE

## 2020-09-21 PROCEDURE — 6370000000 HC RX 637 (ALT 250 FOR IP): Performed by: NURSE PRACTITIONER

## 2020-09-21 PROCEDURE — 80048 BASIC METABOLIC PNL TOTAL CA: CPT

## 2020-09-21 PROCEDURE — 2580000003 HC RX 258: Performed by: SURGERY

## 2020-09-21 PROCEDURE — APPSS45 APP SPLIT SHARED TIME 31-45 MINUTES: Performed by: CLINICAL NURSE SPECIALIST

## 2020-09-21 PROCEDURE — 97530 THERAPEUTIC ACTIVITIES: CPT

## 2020-09-21 PROCEDURE — 85610 PROTHROMBIN TIME: CPT

## 2020-09-21 PROCEDURE — 99232 SBSQ HOSP IP/OBS MODERATE 35: CPT | Performed by: NURSE PRACTITIONER

## 2020-09-21 PROCEDURE — 2500000003 HC RX 250 WO HCPCS: Performed by: SURGERY

## 2020-09-21 PROCEDURE — 2580000003 HC RX 258: Performed by: INTERNAL MEDICINE

## 2020-09-21 PROCEDURE — 6370000000 HC RX 637 (ALT 250 FOR IP): Performed by: SURGERY

## 2020-09-21 PROCEDURE — 99024 POSTOP FOLLOW-UP VISIT: CPT | Performed by: SURGERY

## 2020-09-21 PROCEDURE — 85025 COMPLETE CBC W/AUTO DIFF WBC: CPT

## 2020-09-21 PROCEDURE — 6360000002 HC RX W HCPCS: Performed by: INTERNAL MEDICINE

## 2020-09-21 RX ORDER — WARFARIN SODIUM 5 MG/1
10 TABLET ORAL
Status: DISCONTINUED | OUTPATIENT
Start: 2020-09-21 | End: 2020-09-21

## 2020-09-21 RX ADMIN — FAMOTIDINE 20 MG: 10 INJECTION INTRAVENOUS at 20:19

## 2020-09-21 RX ADMIN — FAMOTIDINE 20 MG: 10 INJECTION INTRAVENOUS at 09:15

## 2020-09-21 RX ADMIN — SODIUM CHLORIDE: 9 INJECTION, SOLUTION INTRAVENOUS at 04:44

## 2020-09-21 RX ADMIN — Medication 10 ML: at 20:20

## 2020-09-21 RX ADMIN — Medication 10 ML: at 09:15

## 2020-09-21 RX ADMIN — ALLOPURINOL 100 MG: 100 TABLET ORAL at 09:16

## 2020-09-21 RX ADMIN — MEROPENEM 1 G: 1 INJECTION, POWDER, FOR SOLUTION INTRAVENOUS at 15:46

## 2020-09-21 RX ADMIN — LISINOPRIL 10 MG: 10 TABLET ORAL at 20:34

## 2020-09-21 RX ADMIN — LEVOTHYROXINE SODIUM 112 MCG: 0.11 TABLET ORAL at 09:16

## 2020-09-21 RX ADMIN — SODIUM CHLORIDE: 9 INJECTION, SOLUTION INTRAVENOUS at 20:36

## 2020-09-21 RX ADMIN — MEROPENEM 1 G: 1 INJECTION, POWDER, FOR SOLUTION INTRAVENOUS at 09:13

## 2020-09-21 RX ADMIN — METOPROLOL SUCCINATE 50 MG: 50 TABLET, EXTENDED RELEASE ORAL at 09:16

## 2020-09-21 RX ADMIN — DIGOXIN 250 MCG: 125 TABLET ORAL at 09:16

## 2020-09-21 ASSESSMENT — PAIN SCALES - GENERAL
PAINLEVEL_OUTOF10: 0

## 2020-09-21 ASSESSMENT — ENCOUNTER SYMPTOMS
ABDOMINAL PAIN: 1
ABDOMINAL DISTENTION: 1
RESPIRATORY NEGATIVE: 1

## 2020-09-21 NOTE — CARE COORDINATION
CM update: POD #10  60 y.o. male status post lap appy for perforated appendicitis  Per surgery provider note  PO Ileus vs sbo: AXR pending completion today. Continue with ngt clamping - await AXR results. Nutrition: pt on verge of needing PICC line and TPN, however will see if able to restart diet in next 24 hours  Abd film today shows:     Impression    Abnormal study, suggesting small bowel obstruction.  No contrast is seen in    the colon at 16 hours.  Abnormally dilated small bowel loops remain      Will continue to follow for discharge needs.       Pasha Morley RN

## 2020-09-21 NOTE — PROGRESS NOTES
[address]  Cardiology  Progress Note    Admission date:  2020    Reason for follow up visit: AF    HPI/CC: Sarah Marks is a 60 y.o. male who was admitted 2020 for abdominal pain, found to have appendicitis and had appendectomy 2020. Noted to have AF RVR. Echo showed EF 50-55%. Rhythm overnight has been AF. Subjective: Denies chest pain, shortness of breath, palpitations and dizziness. Vitals:  Blood pressure 108/70, pulse 101, temperature 97.5 °F (36.4 °C), temperature source Oral, resp. rate 18, height 6' 5\" (1.956 m), weight (!) 402 lb 4.8 oz (182.5 kg), SpO2 94 %.   Temp  Av.6 °F (36.4 °C)  Min: 97.5 °F (36.4 °C)  Max: 97.9 °F (36.6 °C)  Pulse  Av.4  Min: 87  Max: 101  BP  Min: 108/70  Max: 135/81  SpO2  Av %  Min: 94 %  Max: 98 %    24 hour I/O    Intake/Output Summary (Last 24 hours) at 2020 1430  Last data filed at 2020 1344  Gross per 24 hour   Intake 240 ml   Output 1265 ml   Net -1025 ml     Current Facility-Administered Medications   Medication Dose Route Frequency Provider Last Rate Last Dose    phenol 1.4 % mouth spray 1 spray  1 spray Mouth/Throat Q2H PRN Aguilar Ramos, APRN - CNP        metoprolol succinate (TOPROL XL) extended release tablet 50 mg  50 mg Oral Daily Trinna Fast, APRN - CNP   50 mg at 20 0916    glucose (GLUTOSE) 40 % oral gel 15 g  15 g Oral PRN Aguilar Ramos, APRN - CNP        dextrose 50 % IV solution  12.5 g Intravenous PRN Aguilar Ramos, APRN - CNP        digoxin (LANOXIN) tablet 250 mcg  250 mcg Oral Daily Rosia Sings, APRN - CNP   250 mcg at 20 0916    metoprolol (LOPRESSOR) injection 5 mg  5 mg Intravenous Q6H PRN Rosia Michaels, APRN - CNP        lisinopril (PRINIVIL;ZESTRIL) tablet 10 mg  10 mg Oral Nightly Rosia Sings, APRN - CNP   10 mg at 20    calcium carbonate (TUMS) chewable tablet 500 mg  500 mg Oral TID PRN Noe Boss MD   500 mg at 20 7452    perflutren lipid microspheres (DEFINITY) injection 1.65 mg  1.5 mL Intravenous ONCE PRN Efraín Kaiser MD        warfarin (COUMADIN) daily dosing (placeholder)   Other RX Placeholder Efraín Kaiser MD        meropenem (MERREM) 1 g in sodium chloride 0.9 % 100 mL IVPB (mini-bag)  1 g Intravenous Q8H Tam Holloway MD   Stopped at 09/21/20 1002    docusate sodium (COLACE) capsule 100 mg  100 mg Oral Daily Kishor Ontiveros MD   Stopped at 09/19/20 0911    prochlorperazine (COMPAZINE) injection 10 mg  10 mg Intravenous Q6H PRN Kishor Ontiveros MD   10 mg at 09/18/20 1434    naloxone [facility]) injection 0.4 mg  0.4 mg Intravenous PRN Kishor Ontiveros MD        insulin lispro (HUMALOG) injection vial 0-6 Units  0-6 Units Subcutaneous TID WC AIDA Nino CNP        insulin lispro (HUMALOG) injection vial 0-3 Units  0-3 Units Subcutaneous Nightly AIDA Nino CNP        glucagon (rDNA) injection 1 mg  1 mg Intramuscular PRN AIDA Nino CNP        dextrose 5 % solution  100 mL/hr Intravenous PRN AIDA Nino CNP        HYDROcodone-acetaminophen (NORCO) 5-325 MG per tablet 1 tablet  1 tablet Oral Q4H PRN Kishor Ontiveros MD        Or    HYDROcodone-acetaminophen [facility]) 5-325 MG per tablet 2 tablet  2 tablet Oral Q4H PRN Kishor Ontiveros MD   2 tablet at 09/16/20 0718    ondansetron (ZOFRAN) injection 4 mg  4 mg Intravenous Q30 Min PRN AIDA Montez CNP   4 mg at 09/11/20 1144    allopurinol (ZYLOPRIM) tablet 100 mg  100 mg Oral Daily Kishor Ontiveros MD   100 mg at 09/21/20 0916    atorvastatin (LIPITOR) tablet 10 mg  10 mg Oral Daily Kishor Ontiveros MD   10 mg at 09/17/20 2028    azelastine (ASTELIN) 0.1 % nasal spray 1 spray  1 spray Each Nostril BID Kishor Ontiveros MD   1 spray at 09/19/20 0910    clonazePAM (KLONOPIN) tablet 0.5 mg  0.5 mg Oral BID PRN MD Luna Perez gabapentin (NEURONTIN) capsule 300 mg  300 mg Oral TID Judith Paige MD   300 mg at 09/17/20 1347    levothyroxine (SYNTHROID) tablet 112 mcg  112 mcg Oral Daily Judith Paige MD   112 mcg at 09/21/20 0916    cetirizine (ZYRTEC) tablet 10 mg  10 mg Oral Daily Judith Paige MD   10 mg at 09/17/20 0926    montelukast (SINGULAIR) tablet 10 mg  10 mg Oral Nightly Judith Paige MD   10 mg at 09/16/20 2056    [Held by provider] spironolactone (ALDACTONE) tablet 25 mg  25 mg Oral Daily Judith Paige MD   Stopped at 09/17/20 0933    [Held by provider] torsemide (DEMADEX) tablet 20 mg  20 mg Oral Daily Judith Paige MD   20 mg at 09/16/20 0817    sodium chloride flush 0.9 % injection 10 mL  10 mL Intravenous 2 times per day Judith Paige MD   10 mL at 09/21/20 0915    sodium chloride flush 0.9 % injection 10 mL  10 mL Intravenous PRN Judith Paige MD   10 mL at 09/18/20 1757    promethazine (PHENERGAN) tablet 12.5 mg  12.5 mg Oral Q6H PRN Judith Paige MD   12.5 mg at 09/16/20 0636    Or    ondansetron (ZOFRAN) injection 4 mg  4 mg Intravenous Q6H PRN Judith Paige MD   4 mg at 09/18/20 1757    0.9 % sodium chloride infusion   Intravenous Continuous Judith Paige MD 75 mL/hr at 09/21/20 0444      acetaminophen (TYLENOL) tablet 650 mg  650 mg Oral Q4H PRN Judith Paige MD        famotidine (PEPCID) injection 20 mg  20 mg Intravenous BID Judith Paige MD   20 mg at 09/21/20 0915     Facility-Administered Medications Ordered in Other Encounters   Medication Dose Route Frequency Provider Last Rate Last Dose    lidocaine (LMX) 4 % cream   Topical Once Louie Montes DPM         Review of Systems   Constitutional: Negative. Respiratory: Negative. Cardiovascular: Negative. Gastrointestinal: Positive for abdominal distention and abdominal pain. Neurological: Negative. Objective:     Telemetry monitor: AF    Physical Exam:  Constitutional:  Comfortable and alert, NAD, appears stated age, obese  Eyes: PERRL, sclera nonicteric  Neck:  Supple, no masses, no thyroidmegaly, no JVD  Skin:  Warm and dry; no rash or lesions  Heart: Irregular, normal apex, S1 and S2 normal, no M/G/R  Lungs:  Normal respiratory effort; clear; no wheezing/rhonchi/rales  Abdomen: soft, non tender, + bowel sounds  Extremities:  No edema or cyanosis; no clubbing  Neuro: alert and oriented, moves legs and arms equally, normal mood and affect    Data Reviewed:    Echo 9/17/2020:  Technical difficult study due to body habitus. Left ventricular systolic function is low normal with a visually estimated   ejection fraction of 50-55%. There is mild hypokinesis of the apical lateral   wall. The left ventricle is normal in size with mild concentric hypertrophy. Indeterminate diastolic function due to atrial fibrillation. Mitral valve leaflets appear mildly thickened and calcified but open   adequately. Mild mitral regurgitation. Aortic valve sclerosis with normal opening. No evidence of aortic valve regurgitation. The right atrium is enlarged. Right atrial area is 24.8 cm2.     Lab Reviewed:     Renal Profile:  Lab Results   Component Value Date    CREATININE 0.7 09/21/2020    BUN 16 09/21/2020     09/21/2020    K 3.9 09/21/2020     09/21/2020    CO2 27 09/21/2020     CBC:    Lab Results   Component Value Date    WBC 6.2 09/21/2020    RBC 3.87 09/21/2020    HGB 11.8 09/21/2020    HCT 35.0 09/21/2020    MCV 90.5 09/21/2020    RDW 14.6 09/21/2020     09/21/2020     BNP:  No results found for: PROBNP  Fasting Lipid Panel:  No results found for: CHOL, HDL, TRIG  Cardiac Enzymes:  CK/MbTroponin  Lab Results   Component Value Date    TROPONINI <0.01 09/13/2020     PT/ INR   Lab Results   Component Value Date    INR 2.69 09/21/2020    INR 2.76 09/20/2020    INR 2.49 09/19/2020    PROTIME 31.5 09/21/2020    PROTIME 32.3 09/20/2020    PROTIME 29.1 09/19/2020     PTT No results found for: PTT No results found for: MG No results found for: TSH    All labs and imaging reviewed today    Assessment:  Chronic atrial fibrillation: stable   - [id number] score >2 on coumadin as outpatient   - s/p ablations 4/2019 and 9/2019  Nonischemic cardiomyopathy: EF 50-55% in 9/2020; previously 25-30% in 2016   - s/p single chamber ICD 3307  Chronic systolic CHF: compensated  CAD: no ischemia stress test 7/2015; based on CT calcium score 2015 in 2009  HTN: stable  HLD  Obesity  EMELYN  Appendicitis s/p appendectomy 9/11/2020    Plan:   1. Continue toprol and digoxin  2. Holding aldactone and lisinopril due to lower BP  3. Pharmacy dosing coumadin  4.  Daily weights, strict I/Os    He follows with [facility]  [phone]

## 2020-09-21 NOTE — PROGRESS NOTES
Occupational Therapy  Facility/Department: [facility] B3 - MED SURG  Daily Treatment Note/Discharge Note  NAME: Cali Edmond  : 1955  MRN: [id number]    Date of Service: 2020    Discharge Recommendations:  Home with assist PRN     Assessment   Performance deficits / Impairments: Decreased functional mobility ; Decreased ADL status; Decreased endurance;Decreased high-level IADLs  Assessment: pt reports normally independent with basic ADL's, transfers & walking short distances in/out bathroom with or without walker, now limited endurance for OOB activity; continues to benefit from skilled OT services but refuses at this time     Patient participating in ADLs (shaving) and getting cleaned up per report. He reports that he is getting up and moving around and understands the importance of increasing activity. OT reviewed importance of follow-up for additional treatment at frequency of 3-5x/week. OT spent 10 minutes with patient from [id number] to review the importance of increasing activity tolerance with a skilled therapist in order to reduce potential for falls and to improve safety. Patient verbalized understanding, stating that he has had no falls at home, has A-fib and has been educated on importance of attending to limitations. 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 Patient able to verbalize understanding of safety and use of assistive devices and supports as needed in order maximize safety in home environment. No further follow-up recommended at this time. OT Education: OT Role;Plan of Care  Patient Education: disease specific:  importance of OOB to chair, importane of mobility/ADLs  Activity Tolerance  Activity Tolerance: Patient limited by fatigue  Safety Devices  Safety Devices in place: Yes  Type of devices: Nurse notified; Left in chair;Call light within reach       Patient Diagnosis(es): The primary encounter diagnosis was Acute appendicitis with localized peritonitis, without perforation, abscess, or gangrene. Diagnoses of Right lower quadrant abdominal pain, Chronic atrial fibrillation, and Chronic anticoagulation were also pertinent to this visit. has a past medical history of Asthma, Atrial fibrillation ([facility] [address] Diabetes mellitus ([facility] [address] Hyperlipidemia, Hypertension, and Thyroid disease. has a past surgical history that includes Cardiac catheterization (Left, 2015); Cardiac defibrillator placement (2016); ablation of dysrhythmic focus (2009 AND 2009); and laparoscopic appendectomy (N/A, 9/11/2020).     Restrictions  Restrictions/Precautions  Restrictions/Precautions: Fall Risk, General Precautions  Position Activity Restriction  Other position/activity restrictions: NG to suction, DESTIN, IV, telemetry  Subjective   General  Chart Reviewed: Yes  Patient assessed for rehabilitation services?: Yes  Family / Caregiver Present: No  Referring Practitioner: Dr. Juan J Young  Diagnosis: perforated appendix; s/p lap appy 9-11-20  Subjective  Subjective: Patient states he is completing ADLs with staff assist.  Vital Signs  Patient Currently in Pain: Denies   Orientation  Orientation  Overall Orientation Status: Within Functional Limits  Objective    ADL  Feeding: NPO  LE Dressing: Unable to assess(comment)     Plan   Plan  Times per week: 2-4x/ week  Current Treatment Recommendations: Functional Mobility Training, Self-Care / ADL, Endurance Training, Safety Education & Training    AM-PAC Score  AM-[facility] Inpatient Daily Activity Raw Score: 14 (09/21/20 1053)  AM-PAC Inpatient ADL T-Scale Score : 33.39 (09/21/20 1053)  ADL Inpatient CMS 0-100% Score: 59.67 (09/21/20 1053)  ADL Inpatient CMS G-Code Modifier : CK (09/21/20 1053)    Goals  Short term goals  Time Frame for Short term goals: 1 week (9-21-20)  Short term goal 1: supervision with bathroom mobility with RW by 9-21-20 - REFUSED 9/21  Short term goal 2: supervision standing ADL's for 2-3 minutes - REFUSED 9/21  Short term goal 3: set up for LE self care  - REFUSED 9/21  Patient Goals   Patient goals : be able to get to bathroom for a bowel movement     Therapy Time   Individual Concurrent Group Co-treatment   Time In 1030         Time Out 1052         Minutes 22         Timed Code Treatment Minutes: 1420 Peterson Shay, OTR/L

## 2020-09-21 NOTE — PLAN OF CARE
Patient's EF (Ejection Fraction) is greater than 40%    Heart Failure Medications:   Diuretics[de-identified] Furosemide, Torsemide, Spironolactone, Metalozone, Other and None     (One of the following REQUIRED for EF <40%/SYSTOLIC FAILURE but MAY be used in EF% >40%/DIASTOLIC FAILURE)        ACE[de-identified] Lisinopril, Enalapril,  Ramipril, Other and None        ARB[de-identified] Valsartan, Losartan, Other and None         ARNI[de-identified] None    (Beta Blockers)   NON- Evidenced Based Beta Blocker (for EF% >40%/DIASTOLIC FAILURE): Metoprolol TARTrate- Lopressor, Atenolol- Tenormin, Propranolol- Inderal, Esmolol-Brevibloc, Sotalol-Betapace, Labetalol- Trandate, Timolol-Blocadren, Other and None     Evidenced Based Beta Blocker::(REQUIRED for EF% <40%/SYSTOLIC FAILURE) Metoprolol SUCCinate- Toprol XL, Carvedilol- Coreg, Bisoprolol-Zebeta and None  . .................................................................................................................................................. Patient's weights and intake/output reviewed: Yes    Patient's Last Weight: 402 lbs obtained by standing scale. Difference of 1 lbs less than last documented weight. Intake/Output Summary (Last 24 hours) at 9/21/2020 9628  Last data filed at 9/21/2020 0430  Gross per 24 hour   Intake 240 ml   Output 1475 ml   Net -1235 ml       Comorbidities Reviewed Yes    Patient has a past medical history of Asthma, Atrial fibrillation ([facility] [address] Diabetes mellitus ([facility] [address] Hyperlipidemia, Hypertension, and Thyroid disease. >>For CHF and Comorbidity documentation on Education Time and Topics, please see Education Tab    Progressive Mobility Assessment:  What is this patient's Current Level of Mobility?: Ambulatory- with Assistance  How was this patient Mobilized today?: Edge of Bed, Up to Chair, Bedside Commode,  Up to Toilet/Shower, Up in Room, Up in [place], Unable to Mobilize and Patient Refuses to [address]?  Nurse, PCA, PT, OT and Self

## 2020-09-21 NOTE — PROGRESS NOTES
Hospitalist Progress Note      PCP: Mihaela Alvarado    Date of Admission: 9/11/2020    Chief Complaint: hypoxia      Subjective:  EMR and notes reviewed pt seen and examined. Tells me he is having flatus and belching. Denies abd pain, nausea or vomiting. No chest pain or SOB.    Medications:  Reviewed    Infusion Medications    dextrose      sodium chloride 75 mL/hr at 09/21/20 0444     Scheduled Medications    metoprolol succinate  50 mg Oral Daily    digoxin  250 mcg Oral Daily    lisinopril  10 mg Oral Nightly    warfarin (COUMADIN) daily dosing (placeholder)   Other RX Placeholder    meropenem  1 g Intravenous Q8H    docusate sodium  100 mg Oral Daily    insulin lispro  0-6 Units Subcutaneous TID WC    insulin lispro  0-3 Units Subcutaneous Nightly    allopurinol  100 mg Oral Daily    atorvastatin  10 mg Oral Daily    azelastine  1 spray Each Nostril BID    gabapentin  300 mg Oral TID    levothyroxine  112 mcg Oral Daily    cetirizine  10 mg Oral Daily    montelukast  10 mg Oral Nightly    [Held by provider] spironolactone  25 mg Oral Daily    [Held by provider] torsemide  20 mg Oral Daily    sodium chloride flush  10 mL Intravenous 2 times per day    famotidine (PEPCID) injection  20 mg Intravenous BID     PRN Meds: phenol, glucose, dextrose, metoprolol, calcium carbonate, perflutren lipid microspheres, prochlorperazine, naloxone, glucagon (rDNA), dextrose, HYDROcodone 5 mg - acetaminophen **OR** HYDROcodone 5 mg - acetaminophen, ondansetron, clonazePAM, sodium chloride flush, promethazine **OR** ondansetron, acetaminophen      Intake/Output Summary (Last 24 hours) at 9/21/2020 1501  Last data filed at 9/21/2020 1344  Gross per 24 hour   Intake 240 ml   Output 1265 ml   Net -1025 ml       Exam:    /70   Pulse 101   Temp 97.5 °F (36.4 °C) (Oral)   Resp 18   Ht 6' 5\" (1.956 m)   Wt (!) 402 lb 4.8 oz (182.5 kg)   SpO2 94%   BMI 47.71 kg/m²     Gen/overall appearance: Not in acute distress. Alert. Sitting up in chair   Head: Normocephalic, atraumatic  ENT: NG in  Eyes: EOMI, no scleral icterus  CVS: irreg irreg, Normal S1S2  Pulm: Clear to auscultation bilaterally. No crackles/wheezes  Gastrointestinal: Soft, nontender, obese, no guarding or rebound, lap incisions c/d/i, bowel sound present very distant   Extremities: 1+ BLE edema. No erythema or warmth  Neuro: No gross focal deficits noted  Skin: Warm, dry    Labs:   Recent Labs     09/19/20 0430 09/20/20 0405 09/21/20  0445   WBC 6.2 7.0 6.2   HGB 12.5* 11.5* 11.8*   HCT 37.8* 34.7* 35.0*    196 172     Recent Labs     09/19/20 0430 09/20/20 0405 09/21/20  0445   * 136 138   K 4.2 4.0 3.9   CL 98* 101 103   CO2 27 26 27   BUN 20 19 16   CREATININE 0.9 0.7* 0.7*   CALCIUM 9.4 8.9 9.1     No results for input(s): AST, ALT, BILIDIR, BILITOT, ALKPHOS in the last 72 hours. Recent Labs     09/19/20 0430 09/20/20 0405 09/21/20  0445   INR 2.49* 2.76* 2.69*     No results for input(s): Glean Bahai in the last 72 hours.     Assessment/Plan:    Active Hospital Problems    Diagnosis Date Noted    [facility] [K91.89, K56.7]     Chronic anticoagulation [Z79.01]     Non-ischemic cardiomyopathy ([facility] [I42.8]     Chronic systolic congestive heart failure (HCC) [I50.22]     Atrial fibrillation (HCC) [I48.91]     Hyperlipidemia [E78.5]     Diabetes mellitus ([facility] [E11.9]     Hypertension [I10]     Perforated appendicitis [K35.32] 09/11/2020    Morbid obesity ([facility] [E66.01] 03/05/2020       Acute perforated appendicitis s/p appendectomy  High grade small bowel obstruction  -post op management per general surgery  -NG placed  -diet per GS recs  -on meropenem  -prn pain management  -repeat CT abdomen and KUB with high grade SBO- GS repeating ABX in am possibly may need surgical intervention     Acute episode of hypoxia, nausea, chest pain consistent with acute post op hypoxic respiratory failure. Transient and now resolved. Suspect 2/2 IV opiates  -negative troponin  -tele monitoring  -minimize opiates if possible    Acute on chronic Atrial fibrillation. Now with RVR. Improving  - started on amiodarone, then stopped per cards  - po digoxin  - resume metoprolol  - home [facility] with coumadin  - ECHO with preserved EF; mild hypokinesis of apical lat wall  - closely monitor and replace lytes  - cardio following     Morbid obesity  -With Body mass index is 16.7 kg/m². Complicating assessment and treatment. Placing patient at risk for multiple co-morbidities as well as early death and contributing to the patient's presentation. Counseled on weight loss     DM2 with peripheral neuropathy, controlled. Hgb a1c 7.1  -ldssi  -poct ac/hs  -hypoglycemia protocol  -carb control diet when eating  -continue gabapentin     PMH of Essential HTN, hld  -continue home regimen      Diet: Diet NPO Effective Now Exceptions are: Ice Chips, Sips with Meds, Popsicles  Code Status: Full Code     PT/OT: iman ordered    Dispo - ultimately per surgery.        Selena Phalen, AIDA - CNP

## 2020-09-21 NOTE — PROGRESS NOTES
[facility] GENERAL SURGERY    Surgery Progress Note           POD # 10    PATIENT NAME: Arsenio Thurman     TODAY'S DATE: 9/21/2020    INTERVAL HISTORY:    Pt continues to have BMs and states he is passing flatus. Denies abd pain or bloating. OBJECTIVE:   VITALS:  /70   Pulse 101   Temp 97.5 °F (36.4 °C) (Oral)   Resp 18   Ht 6' 5\" (1.956 m)   Wt (!) 402 lb 4.8 oz (182.5 kg)   SpO2 94%   BMI 47.71 kg/m²     INTAKE/OUTPUT:    I/O last 3 completed shifts: In: 240 [P.O.:240]  Out: 1475 [Urine:850; Emesis/NG output:500; Drains:125]  I/O this shift: In: 0   Out: 48 [Urine:50]              CONSTITUTIONAL:  awake and alert  ABDOMEN: obese, non distended, non tender, gisel serous. INCISION: no drainage    Data:  CBC:   Recent Labs     09/19/20  0430 09/20/20  0405 09/21/20  0445   WBC 6.2 7.0 6.2   HGB 12.5* 11.5* 11.8*   HCT 37.8* 34.7* 35.0*    196 172     BMP:    Recent Labs     09/19/20  0430 09/20/20  0405 09/21/20  0445   * 136 138   K 4.2 4.0 3.9   CL 98* 101 103   CO2 27 26 27   BUN 20 19 16   CREATININE 0.9 0.7* 0.7*   GLUCOSE 99 90 90     EXAMINATION:   SMALL BOWEL FOLLOW THROUGH SERIES     9/20/2020     TECHNIQUE:   Small bowel follow through series was performed with overhead images and spot   images. FLUOROSCOPY DOSE AND TYPE OR TIME AND EXPOSURES:   17 total images     COMPARISON:   Recent abdominal radiograph     HISTORY:   ORDERING SYSTEM PROVIDED HISTORY: PSBO post operative   TECHNOLOGIST PROVIDED HISTORY:   Tracee Garciae to put contrast through ngt   Reason for exam:->PSBO post operative     FINDINGS:   Gaseous distention is seen throughout the abdomen on  images     There is persistent abnormal small bowel dilatation, greatest in the left   upper quadrant. No significant contrast is seen in the colon at 16 hours.     Impression:      Abnormal study, suggesting small bowel obstruction.  No contrast is seen in   the colon at 16 hours.  Abnormally dilated small bowel loops remain          ASSESSMENT AND PLAN:  60 y.o. male status post lap appy for perforated appendicitis    PO Ileus vs sbo: SBFT as above, although pt seems clinically improving, SBFT speaks against this. Discussed at length with pt and will obtain AXR in the AM to see how/if contrast has progressed. May need laparoscopy if no improvement. Nutrition: pt on verge of needing PICC line and TPN, however will give one more day to see how things look tomorrow. Afib: cardiology managing - will hold coumadin today  Activity: PT/OT  Morbid Obesity: BMI : 39.41 Complicating assessment and treatment. Placing patient at risk for multiple co-morbidities and complications. ID: continue with IV antibiotics given perforation      Electronically signed by AIDA Mills - CNP     Patient seen and agree with above. Passing flatus and having bms but SBFT with obstructive picture. Discussed with patient that if not improving soon then diagnostic laparoscopy might be needed. AXR tomorrow.     Nia Reddy MD

## 2020-09-21 NOTE — PROGRESS NOTES
Pharmacy to Dose Warfarin     Dx: Afib   Goal INR range 2-3   Home Warfarin dose: 10mg daily except 12.5mg on Wed     Date                 INR                  Warfarin  9/12                2.07                 10 mg  9/13                1.83                  Held /NPO  9/14                1.74                  Held /NPO    9/15                1.54                  12.5 mg (Rx consulted)  9/16                1.42                  12.5 mg   9/17                1.46                  12.5  9/18                1.87                  10 mg   9/19                2.49                   7.5 mg  9/20                2.76                   7.5 mg  9/21                2.69                   10 mg       Recommend Warfarin 10 mg tonight x1. Daily INR ordered. Rx will continue to manage therapy per Dr. Momo Chiu consult order.   Yanet Almonte/Armando. 9/21/20 8:58 AM EDT

## 2020-09-21 NOTE — PLAN OF CARE
Problem: OXYGENATION/RESPIRATORY FUNCTION  Goal: Patient will maintain patent airway  Outcome: Met This Shift  Goal: Patient will achieve/maintain normal respiratory rate/effort  Description: Respiratory rate and effort will be within normal limits for the patient  Outcome: Met This Shift     Problem: HEMODYNAMIC STATUS  Goal: Patient has stable vital signs and fluid balance  Outcome: Ongoing     Problem: FLUID AND ELECTROLYTE IMBALANCE  Goal: Fluid and electrolyte balance are achieved/maintained  Outcome: Ongoing     Problem: ACTIVITY INTOLERANCE/IMPAIRED MOBILITY  Goal: Mobility/activity is maintained at optimum level for patient  Outcome: Met This Shift

## 2020-09-21 NOTE — PROGRESS NOTES
Assessment complete. VSS. Call light within reach, nonslip socks on. No needs expressed at this time. Will continue to monitor.

## 2020-09-22 ENCOUNTER — APPOINTMENT (OUTPATIENT)
Dept: GENERAL RADIOLOGY | Age: 65
DRG: 338 | End: 2020-09-22
Payer: MEDICARE

## 2020-09-22 LAB
GLUCOSE BLD-MCNC: 77 MG/DL (ref 70–99)
GLUCOSE BLD-MCNC: 79 MG/DL (ref 70–99)
GLUCOSE BLD-MCNC: 84 MG/DL (ref 70–99)
GLUCOSE BLD-MCNC: 85 MG/DL (ref 70–99)
GLUCOSE BLD-MCNC: 90 MG/DL (ref 70–99)
GLUCOSE BLD-MCNC: 91 MG/DL (ref 70–99)
INR BLD: 2.65 (ref 0.86–1.14)
PERFORMED ON: NORMAL
PROTHROMBIN TIME: 31 SEC (ref 10–13.2)

## 2020-09-22 PROCEDURE — 99232 SBSQ HOSP IP/OBS MODERATE 35: CPT | Performed by: NURSE PRACTITIONER

## 2020-09-22 PROCEDURE — 2500000003 HC RX 250 WO HCPCS: Performed by: SURGERY

## 2020-09-22 PROCEDURE — 6370000000 HC RX 637 (ALT 250 FOR IP): Performed by: NURSE PRACTITIONER

## 2020-09-22 PROCEDURE — 2580000003 HC RX 258: Performed by: SURGERY

## 2020-09-22 PROCEDURE — 74022 RADEX COMPL AQT ABD SERIES: CPT

## 2020-09-22 PROCEDURE — 99024 POSTOP FOLLOW-UP VISIT: CPT | Performed by: SURGERY

## 2020-09-22 PROCEDURE — 1200000000 HC SEMI PRIVATE

## 2020-09-22 PROCEDURE — 6370000000 HC RX 637 (ALT 250 FOR IP): Performed by: SURGERY

## 2020-09-22 PROCEDURE — 2580000003 HC RX 258: Performed by: INTERNAL MEDICINE

## 2020-09-22 PROCEDURE — 2580000003 HC RX 258

## 2020-09-22 PROCEDURE — 6360000002 HC RX W HCPCS: Performed by: INTERNAL MEDICINE

## 2020-09-22 PROCEDURE — 6360000002 HC RX W HCPCS: Performed by: NURSE PRACTITIONER

## 2020-09-22 PROCEDURE — 85610 PROTHROMBIN TIME: CPT

## 2020-09-22 RX ORDER — SODIUM CHLORIDE 9 MG/ML
INJECTION INTRAVENOUS
Status: COMPLETED
Start: 2020-09-22 | End: 2020-09-22

## 2020-09-22 RX ADMIN — MEROPENEM 1 G: 1 INJECTION, POWDER, FOR SOLUTION INTRAVENOUS at 17:11

## 2020-09-22 RX ADMIN — SODIUM CHLORIDE 20 ML: 9 INJECTION, SOLUTION INTRAMUSCULAR; INTRAVENOUS; SUBCUTANEOUS at 22:46

## 2020-09-22 RX ADMIN — LEVOTHYROXINE SODIUM 112 MCG: 0.11 TABLET ORAL at 08:49

## 2020-09-22 RX ADMIN — DIGOXIN 250 MCG: 125 TABLET ORAL at 08:49

## 2020-09-22 RX ADMIN — ALTEPLASE 1 MG: 2.2 INJECTION, POWDER, LYOPHILIZED, FOR SOLUTION INTRAVENOUS at 22:46

## 2020-09-22 RX ADMIN — FAMOTIDINE 20 MG: 10 INJECTION INTRAVENOUS at 09:01

## 2020-09-22 RX ADMIN — Medication 10 ML: at 08:48

## 2020-09-22 RX ADMIN — METOPROLOL SUCCINATE 50 MG: 50 TABLET, EXTENDED RELEASE ORAL at 08:50

## 2020-09-22 RX ADMIN — FAMOTIDINE 20 MG: 10 INJECTION INTRAVENOUS at 20:25

## 2020-09-22 RX ADMIN — MEROPENEM 1 G: 1 INJECTION, POWDER, FOR SOLUTION INTRAVENOUS at 00:27

## 2020-09-22 RX ADMIN — MEROPENEM 1 G: 1 INJECTION, POWDER, FOR SOLUTION INTRAVENOUS at 08:48

## 2020-09-22 RX ADMIN — MEROPENEM 1 G: 1 INJECTION, POWDER, FOR SOLUTION INTRAVENOUS at 23:20

## 2020-09-22 RX ADMIN — Medication 10 ML: at 21:39

## 2020-09-22 RX ADMIN — SODIUM CHLORIDE: 9 INJECTION, SOLUTION INTRAVENOUS at 12:11

## 2020-09-22 ASSESSMENT — PAIN SCALES - GENERAL
PAINLEVEL_OUTOF10: 0

## 2020-09-22 ASSESSMENT — ENCOUNTER SYMPTOMS
ABDOMINAL PAIN: 1
RESPIRATORY NEGATIVE: 1
ABDOMINAL DISTENTION: 1

## 2020-09-22 NOTE — PROGRESS NOTES
[facility] GENERAL SURGERY    Surgery Progress Note           POD # 11    PATIENT NAME: Chani Wheeler     TODAY'S DATE: 9/22/2020    INTERVAL HISTORY:    Pt having flatus and bms, no nausea. OBJECTIVE:   VITALS:  /79   Pulse 94   Temp 98.4 °F (36.9 °C) (Oral)   Resp 16   Ht 6' 5\" (1.956 m)   Wt (!) 401 lb 11.2 oz (182.2 kg)   SpO2 98%   BMI 47.63 kg/m²     INTAKE/OUTPUT:    I/O last 3 completed shifts: In: 0   Out: 580 [Urine:450; Drains:130]  I/O this shift: In: 0   Out: 265 [Urine:100; Emesis/NG output:100; Drains:65]              CONSTITUTIONAL:  awake and alert  LUNGS:  no crackles or wheezing  ABDOMEN:   hypoactive bowel sounds, soft, non-distended, nontender, gisel serous   INCISION: no drainage    Data:  CBC:   Recent Labs     09/20/20  0405 09/21/20  0445   WBC 7.0 6.2   HGB 11.5* 11.8*   HCT 34.7* 35.0*    172     BMP:    Recent Labs     09/20/20  0405 09/21/20  0445    138   K 4.0 3.9    103   CO2 26 27   BUN 19 16   CREATININE 0.7* 0.7*   GLUCOSE 90 90     Hepatic: No results for input(s): AST, ALT, ALB, BILITOT, ALKPHOS in the last 72 hours. Mag:    No results for input(s): MG in the last 72 hours. Phos:   No results for input(s): PHOS in the last 72 hours. INR:   Recent Labs     09/20/20  0405 09/21/20  0445 09/22/20  0530   INR 2.76* 2.69* 2.65*         Radiology Review:  AXR - Findings of persistent or recurrent small bowel obstruction.  This may be   moderate but partial SBO given the fact that the barium from small bowel   study has passed and is now dispersed throughout the colon.  The colon is   nondistended. ASSESSMENT AND PLAN:  60 y.o. male status post lap appy with postop ileus vs psbo  1. Improving GI function clinically but persistent dilatation on AXR. Contrast has moved into colon. 2.  Will remove ng but keep on ice chips only.   3.  Continue to hold coumadin today because if he fails diet advancement then OR may be needed.           Electronically signed by Tomi Fish MD

## 2020-09-22 NOTE — PROGRESS NOTES
[address]  Cardiology  Progress Note    Admission date:  2020    Reason for follow up visit: AF    HPI/CC: Timothy Escobar is a 60 y.o. male who was admitted 2020 for abdominal pain, found to have appendicitis and had appendectomy 2020. Noted to have AF RVR. Echo showed EF 50-55%. Rhythm overnight has been AF. Subjective: Denies chest pain, shortness of breath, palpitations and dizziness. Vitals:  Blood pressure 122/79, pulse 94, temperature 98.4 °F (36.9 °C), temperature source Oral, resp. rate 16, height 6' 5\" (1.956 m), weight (!) 401 lb 11.2 oz (182.2 kg), SpO2 98 %.   Temp  Av.8 °F (36.6 °C)  Min: 97.5 °F (36.4 °C)  Max: 98.4 °F (36.9 °C)  Pulse  Av.1  Min: 84  Max: 99  BP  Min: 100/66  Max: 126/82  SpO2  Av.3 %  Min: 93 %  Max: 98 %    24 hour I/O    Intake/Output Summary (Last 24 hours) at 2020 1347  Last data filed at 2020 1059  Gross per 24 hour   Intake 0 ml   Output 730 ml   Net -730 ml     Current Facility-Administered Medications   Medication Dose Route Frequency Provider Last Rate Last Dose    phenol 1.4 % mouth spray 1 spray  1 spray Mouth/Throat Q2H PRN AIDA Saldana CNP        metoprolol succinate (TOPROL XL) extended release tablet 50 mg  50 mg Oral Daily AIDA Nogueira CNP   50 mg at 20 0850    glucose (GLUTOSE) 40 % oral gel 15 g  15 g Oral PRN AIDA Saldana CNP        dextrose 50 % IV solution  12.5 g Intravenous PRAIDA Jordan CNP        digoxin (LANOXIN) tablet 250 mcg  250 mcg Oral Daily AIDA Bill CNP   250 mcg at 20 0849    metoprolol (LOPRESSOR) injection 5 mg  5 mg Intravenous Q6H PRN AIDA Bill CNP        lisinopril (PRINIVIL;ZESTRIL) tablet 10 mg  10 mg Oral Nightly AIDA Bill CNP   10 mg at 20    calcium carbonate (TUMS) chewable tablet 500 mg  500 mg Oral TID PRN Hans Rahman MD   500 mg at 20 7382    perflutren lipid microspheres (DEFINITY) injection 1.65 mg  1.5 mL Intravenous ONCE PRN Donald Duarte MD        warfarin (COUMADIN) daily dosing (placeholder)   Other RX Placeholder Donald Duarte MD        meropenem (MERREM) 1 g in sodium chloride 0.9 % 100 mL IVPB (mini-bag)  1 g Intravenous Q8H Wendy Nolen MD   Stopped at 09/22/20 0920    docusate sodium (COLACE) capsule 100 mg  100 mg Oral Daily Josue Arroyo MD   Stopped at 09/19/20 0911    prochlorperazine (COMPAZINE) injection 10 mg  10 mg Intravenous Q6H PRN Josue Arroyo MD   10 mg at 09/18/20 1434    naloxone [facility]) injection 0.4 mg  0.4 mg Intravenous PRN Josue Arroyo MD        insulin lispro (HUMALOG) injection vial 0-6 Units  0-6 Units Subcutaneous TID WC AIDA Mancuso CNP        insulin lispro (HUMALOG) injection vial 0-3 Units  0-3 Units Subcutaneous Nightly AIDA Mancuso CNP        glucagon (rDNA) injection 1 mg  1 mg Intramuscular PRN AIDA Mancuso CNP        dextrose 5 % solution  100 mL/hr Intravenous PRN AIDA Mancuso CNP        HYDROcodone-acetaminophen (NORCO) 5-325 MG per tablet 1 tablet  1 tablet Oral Q4H PRN Josue Arroyo MD        Or    HYDROcodone-acetaminophen [facility]) 5-325 MG per tablet 2 tablet  2 tablet Oral Q4H PRN Josue Arroyo MD   2 tablet at 09/16/20 0718    ondansetron (ZOFRAN) injection 4 mg  4 mg Intravenous Q30 Min PRN AIDA Montoya CNP   4 mg at 09/11/20 1144    allopurinol (ZYLOPRIM) tablet 100 mg  100 mg Oral Daily Josue Arroyo MD   100 mg at 09/21/20 0916    atorvastatin (LIPITOR) tablet 10 mg  10 mg Oral Daily Josue Arroyo MD   10 mg at 09/17/20 2028    azelastine (ASTELIN) 0.1 % nasal spray 1 spray  1 spray Each Nostril BID Josue Arroyo MD   1 spray at 09/19/20 0910    clonazePAM (KLONOPIN) tablet 0.5 mg  0.5 mg Oral BID PRN MD Loraine Rai Pulling Neurological: Negative. Objective:     Telemetry monitor: AF    Physical Exam:  Constitutional:  Comfortable and alert, NAD, appears stated age, obese  Eyes: PERRL, sclera nonicteric  Neck:  Supple, no masses, no thyroidmegaly, no JVD  Skin:  Warm and dry; no rash or lesions  Heart: Irregular, normal apex, S1 and S2 normal, no M/G/R  Lungs:  Normal respiratory effort; clear; no wheezing/rhonchi/rales  Abdomen: soft, non tender, + bowel sounds  Extremities:  No edema or cyanosis; no clubbing  Neuro: alert and oriented, moves legs and arms equally, normal mood and affect    Data Reviewed:    Echo 9/17/2020:  Technical difficult study due to body habitus. Left ventricular systolic function is low normal with a visually estimated   ejection fraction of 50-55%. There is mild hypokinesis of the apical lateral   wall. The left ventricle is normal in size with mild concentric hypertrophy. Indeterminate diastolic function due to atrial fibrillation. Mitral valve leaflets appear mildly thickened and calcified but open   adequately. Mild mitral regurgitation. Aortic valve sclerosis with normal opening. No evidence of aortic valve regurgitation. The right atrium is enlarged. Right atrial area is 24.8 cm2.     Lab Reviewed:     Renal Profile:  Lab Results   Component Value Date    CREATININE 0.7 09/21/2020    BUN 16 09/21/2020     09/21/2020    K 3.9 09/21/2020     09/21/2020    CO2 27 09/21/2020     CBC:    Lab Results   Component Value Date    WBC 6.2 09/21/2020    RBC 3.87 09/21/2020    HGB 11.8 09/21/2020    HCT 35.0 09/21/2020    MCV 90.5 09/21/2020    RDW 14.6 09/21/2020     09/21/2020     BNP:  No results found for: PROBNP  Fasting Lipid Panel:  No results found for: CHOL, HDL, TRIG  Cardiac Enzymes:  CK/MbTroponin  Lab Results   Component Value Date    TROPONINI <0.01 09/13/2020     PT/ INR   Lab Results   Component Value Date    INR 2.65 09/22/2020    INR 2.69 09/21/2020    INR

## 2020-09-22 NOTE — PROGRESS NOTES
Hospitalist Progress Note      PCP: Alfred Moore    Date of Admission: 9/11/2020    Chief Complaint: hypoxia      Subjective:  EMR and notes reviewed pt seen and examined. Tells me he is having flatus and belching. Denies abd pain, nausea or vomiting. No chest pain or SOB.    Medications:  Reviewed    Infusion Medications    dextrose      sodium chloride 75 mL/hr at 09/21/20 2036     Scheduled Medications    metoprolol succinate  50 mg Oral Daily    digoxin  250 mcg Oral Daily    lisinopril  10 mg Oral Nightly    warfarin (COUMADIN) daily dosing (placeholder)   Other RX Placeholder    meropenem  1 g Intravenous Q8H    docusate sodium  100 mg Oral Daily    insulin lispro  0-6 Units Subcutaneous TID WC    insulin lispro  0-3 Units Subcutaneous Nightly    allopurinol  100 mg Oral Daily    atorvastatin  10 mg Oral Daily    azelastine  1 spray Each Nostril BID    gabapentin  300 mg Oral TID    levothyroxine  112 mcg Oral Daily    cetirizine  10 mg Oral Daily    montelukast  10 mg Oral Nightly    [Held by provider] spironolactone  25 mg Oral Daily    [Held by provider] torsemide  20 mg Oral Daily    sodium chloride flush  10 mL Intravenous 2 times per day    famotidine (PEPCID) injection  20 mg Intravenous BID     PRN Meds: phenol, glucose, dextrose, metoprolol, calcium carbonate, perflutren lipid microspheres, prochlorperazine, naloxone, glucagon (rDNA), dextrose, HYDROcodone 5 mg - acetaminophen **OR** HYDROcodone 5 mg - acetaminophen, ondansetron, clonazePAM, sodium chloride flush, promethazine **OR** ondansetron, acetaminophen      Intake/Output Summary (Last 24 hours) at 9/22/2020 0738  Last data filed at 9/22/2020 0340  Gross per 24 hour   Intake 0 ml   Output 580 ml   Net -580 ml       Exam:    /89   Pulse 99   Temp 97.6 °F (36.4 °C) (Oral)   Resp 16   Ht 6' 5\" (1.956 m)   Wt (!) 401 lb 11.2 oz (182.2 kg)   SpO2 95%   BMI 47.63 kg/m²     Gen/overall appearance: Not in acute distress. Alert. Sitting up in chair   Head: Normocephalic, atraumatic  ENT: NG in  Eyes: EOMI, no scleral icterus  CVS: irreg irreg, Normal S1S2  Pulm: Clear to auscultation bilaterally. No crackles/wheezes  Gastrointestinal: Soft, nontender, obese, no guarding or rebound, lap incisions c/d/i, bowel sound present very distant   Extremities: 1+ BLE edema. No erythema or warmth  Neuro: No gross focal deficits noted  Skin: Warm, dry    Labs:   Recent Labs     09/20/20 0405 09/21/20  0445   WBC 7.0 6.2   HGB 11.5* 11.8*   HCT 34.7* 35.0*    172     Recent Labs     09/20/20 0405 09/21/20  0445    138   K 4.0 3.9    103   CO2 26 27   BUN 19 16   CREATININE 0.7* 0.7*   CALCIUM 8.9 9.1     No results for input(s): AST, ALT, BILIDIR, BILITOT, ALKPHOS in the last 72 hours. Recent Labs     09/20/20 0405 09/21/20 0445 09/22/20  0530   INR 2.76* 2.69* 2.65*     No results for input(s): CKTOTAL, TROPONINI in the last 72 hours. Assessment/Plan:    Active Hospital Problems    Diagnosis Date Noted    [facility] [K91.89, K56.7]     Chronic anticoagulation [Z79.01]     Nonischemic cardiomyopathy ([facility] [I42.8]     Chronic systolic congestive heart failure (HCC) [I50.22]     Atrial fibrillation (HCC) [I48.91]     Hyperlipidemia [E78.5]     Diabetes mellitus ([facility] [E11.9]     Hypertension [I10]     Perforated appendicitis [K35.32] 09/11/2020    Morbid obesity ([facility] [E66.01] 03/05/2020       Acute perforated appendicitis s/p appendectomy  High grade small bowel obstruction  -post op management per general surgery  -NG placed  -diet per GS recs  -on meropenem  -prn pain management  -repeat CT abdomen and KUB with high grade SBO- GS repeating ABX in am possibly may need surgical intervention     Acute episode of hypoxia, nausea, chest pain consistent with acute post op hypoxic respiratory failure. Transient and now resolved.   Suspect 2/2 IV opiates  -negative troponin  -tele monitoring  -minimize opiates if possible    Acute on chronic Atrial fibrillation. Now with RVR. Improving  - started on amiodarone, then stopped per cards  - po digoxin  - resume metoprolol  [facility]  - ECHO with preserved EF; mild hypokinesis of apical lat wall  - closely monitor and replace lytes  - cardio following     Morbid obesity  -With Body mass index is 43.0 kg/m². Complicating assessment and treatment. Placing patient at risk for multiple co-morbidities as well as early death and contributing to the patient's presentation. Counseled on weight loss     DM2 with peripheral neuropathy, controlled. Hgb a1c 7.1  -ldssi  -poct ac/hs  -hypoglycemia protocol  -carb control diet when eating  -continue gabapentin     PMH of Essential HTN, hld  -continue home regimen      Diet: Diet NPO Effective Now Exceptions are: Ice Chips, Sips with Meds, Popsicles  Code Status: Full Code     PT/OT: iman ordered    Dispo - ultimately per surgery.        AIDA Holbrook - CNP

## 2020-09-23 LAB
ANION GAP SERPL CALCULATED.3IONS-SCNC: 11 MMOL/L (ref 3–16)
BASOPHILS ABSOLUTE: 0.1 K/UL (ref 0–0.2)
BASOPHILS RELATIVE PERCENT: 1.4 %
BUN BLDV-MCNC: 13 MG/DL (ref 7–20)
CALCIUM SERPL-MCNC: 8 MG/DL (ref 8.3–10.6)
CHLORIDE BLD-SCNC: 106 MMOL/L (ref 99–110)
CO2: 23 MMOL/L (ref 21–32)
CREAT SERPL-MCNC: 0.7 MG/DL (ref 0.8–1.3)
EOSINOPHILS ABSOLUTE: 0.2 K/UL (ref 0–0.6)
EOSINOPHILS RELATIVE PERCENT: 2.7 %
GFR AFRICAN AMERICAN: >60
GFR NON-AFRICAN AMERICAN: >60
GLUCOSE BLD-MCNC: 79 MG/DL (ref 70–99)
GLUCOSE BLD-MCNC: 85 MG/DL (ref 70–99)
GLUCOSE BLD-MCNC: 92 MG/DL (ref 70–99)
GLUCOSE BLD-MCNC: 92 MG/DL (ref 70–99)
GLUCOSE BLD-MCNC: 94 MG/DL (ref 70–99)
GLUCOSE BLD-MCNC: 95 MG/DL (ref 70–99)
HCT VFR BLD CALC: 34.5 % (ref 40.5–52.5)
HEMOGLOBIN: 11.2 G/DL (ref 13.5–17.5)
INR BLD: 1.94 (ref 0.86–1.14)
INR BLD: 2.13 (ref 0.86–1.14)
LYMPHOCYTES ABSOLUTE: 2.1 K/UL (ref 1–5.1)
LYMPHOCYTES RELATIVE PERCENT: 31.2 %
MCH RBC QN AUTO: 29.7 PG (ref 26–34)
MCHC RBC AUTO-ENTMCNC: 32.4 G/DL (ref 31–36)
MCV RBC AUTO: 91.6 FL (ref 80–100)
MONOCYTES ABSOLUTE: 0.5 K/UL (ref 0–1.3)
MONOCYTES RELATIVE PERCENT: 7.7 %
NEUTROPHILS ABSOLUTE: 3.9 K/UL (ref 1.7–7.7)
NEUTROPHILS RELATIVE PERCENT: 57 %
PDW BLD-RTO: 14.6 % (ref 12.4–15.4)
PERFORMED ON: NORMAL
PLATELET # BLD: 202 K/UL (ref 135–450)
PMV BLD AUTO: 8.9 FL (ref 5–10.5)
POTASSIUM SERPL-SCNC: 3.9 MMOL/L (ref 3.5–5.1)
PROTHROMBIN TIME: 22.6 SEC (ref 10–13.2)
PROTHROMBIN TIME: 24.9 SEC (ref 10–13.2)
RBC # BLD: 3.77 M/UL (ref 4.2–5.9)
SODIUM BLD-SCNC: 140 MMOL/L (ref 136–145)
WBC # BLD: 6.8 K/UL (ref 4–11)

## 2020-09-23 PROCEDURE — 2500000003 HC RX 250 WO HCPCS: Performed by: SURGERY

## 2020-09-23 PROCEDURE — 85025 COMPLETE CBC W/AUTO DIFF WBC: CPT

## 2020-09-23 PROCEDURE — 80048 BASIC METABOLIC PNL TOTAL CA: CPT

## 2020-09-23 PROCEDURE — 6360000002 HC RX W HCPCS: Performed by: INTERNAL MEDICINE

## 2020-09-23 PROCEDURE — 6370000000 HC RX 637 (ALT 250 FOR IP): Performed by: NURSE PRACTITIONER

## 2020-09-23 PROCEDURE — 36592 COLLECT BLOOD FROM PICC: CPT

## 2020-09-23 PROCEDURE — 1200000000 HC SEMI PRIVATE

## 2020-09-23 PROCEDURE — 2580000003 HC RX 258: Performed by: INTERNAL MEDICINE

## 2020-09-23 PROCEDURE — 85610 PROTHROMBIN TIME: CPT

## 2020-09-23 PROCEDURE — APPSS45 APP SPLIT SHARED TIME 31-45 MINUTES: Performed by: CLINICAL NURSE SPECIALIST

## 2020-09-23 PROCEDURE — 6370000000 HC RX 637 (ALT 250 FOR IP): Performed by: SURGERY

## 2020-09-23 PROCEDURE — 99024 POSTOP FOLLOW-UP VISIT: CPT | Performed by: SURGERY

## 2020-09-23 PROCEDURE — 2580000003 HC RX 258: Performed by: SURGERY

## 2020-09-23 RX ORDER — WARFARIN SODIUM 5 MG/1
10 TABLET ORAL
Status: COMPLETED | OUTPATIENT
Start: 2020-09-23 | End: 2020-09-23

## 2020-09-23 RX ADMIN — FAMOTIDINE 20 MG: 10 INJECTION INTRAVENOUS at 21:04

## 2020-09-23 RX ADMIN — Medication 10 ML: at 08:01

## 2020-09-23 RX ADMIN — WARFARIN SODIUM 10 MG: 5 TABLET ORAL at 17:35

## 2020-09-23 RX ADMIN — MEROPENEM 1 G: 1 INJECTION, POWDER, FOR SOLUTION INTRAVENOUS at 07:56

## 2020-09-23 RX ADMIN — ATORVASTATIN CALCIUM 10 MG: 10 TABLET, FILM COATED ORAL at 21:05

## 2020-09-23 RX ADMIN — Medication 10 ML: at 21:05

## 2020-09-23 RX ADMIN — FAMOTIDINE 20 MG: 10 INJECTION INTRAVENOUS at 07:57

## 2020-09-23 RX ADMIN — SODIUM CHLORIDE: 9 INJECTION, SOLUTION INTRAVENOUS at 01:58

## 2020-09-23 RX ADMIN — METOPROLOL SUCCINATE 50 MG: 50 TABLET, EXTENDED RELEASE ORAL at 07:57

## 2020-09-23 RX ADMIN — DIGOXIN 250 MCG: 125 TABLET ORAL at 07:57

## 2020-09-23 RX ADMIN — ALLOPURINOL 100 MG: 100 TABLET ORAL at 07:56

## 2020-09-23 RX ADMIN — LEVOTHYROXINE SODIUM 112 MCG: 0.11 TABLET ORAL at 07:57

## 2020-09-23 ASSESSMENT — PAIN SCALES - GENERAL
PAINLEVEL_OUTOF10: 0

## 2020-09-23 NOTE — PLAN OF CARE
Problem: OXYGENATION/RESPIRATORY FUNCTION  Goal: Patient will maintain patent airway  Outcome: Ongoing     Patient's EF (Ejection Fraction) is greater than 40%    Heart Failure Medications:  Diuretics[de-identified] None    (One of the following REQUIRED for EF <40%/SYSTOLIC FAILURE but MAY be used in EF% >40%/DIASTOLIC FAILURE)        ACE[de-identified] Lisinopril        ARB[de-identified] None         ARNI[de-identified] None    (Beta Blockers)  NON- Evidenced Based Beta Blocker (for EF% >40%/DIASTOLIC FAILURE): None    Evidenced Based Beta Blocker::(REQUIRED for EF% <40%/SYSTOLIC FAILURE) Metoprolol SUCCinate- Toprol XL  . .................................................................................................................................................. Patient's weights and intake/output reviewed: Yes    Patient's Last Weight: 401.7 lbs obtained by standing scale. Difference of 0.6 lbs less than last documented weight. Intake/Output Summary (Last 24 hours) at 9/23/2020 0018  Last data filed at 9/22/2020 2147  Gross per 24 hour   Intake 0 ml   Output 610 ml   Net -610 ml       Comorbidities Reviewed Yes    Patient has a past medical history of Asthma, Atrial fibrillation ([address] Diabetes mellitus ([address] Hyperlipidemia, Hypertension, and Thyroid disease. >>For CHF and Comorbidity documentation on Education Time and Topics, please see Education Tab    Progressive Mobility Assessment:  What is this patient's Current Level of Mobility?: Ambulatory- with Assistance  How was this patient Mobilized today?: Up to Chair, Bedside Commode, and Up in Room                 With Whom? Nurse and PCA                 Level of Difficulty/Assistance: 1x Assist     Pt resting in bed at this time on room air. Pt denies shortness of breath. Pt with nonpitting lower extremity edema.      Patient and/or Family's stated Goal of Care this Admission: reduce shortness of breath, increase activity tolerance, better understand heart failure and disease management, be more comfortable, and reduce lower extremity edema prior to discharge        :

## 2020-09-23 NOTE — CONSULTS
Pharmacy to Dose Warfarin     Dx: Afib   Goal INR range 2-3   Home Warfarin dose: 10mg daily except 12.5mg on Wed     Date                 INR                  Warfarin  9/12                2.07                 10 mg  9/13                1.83                  Held /NPO  9/14                1.74                  Held /NPO    9/15                1.54                  12.5 mg (Rx consulted)  9/16                1.42                  12.5 mg   9/17                1.46                  12.5  9/18                1.87                  10 mg   9/19                2.49                   7.5 mg  9/20                2.76                   7.5 mg  9/21                2.69                   d/c'd by MD   9/22                2.65                   0 mg  9/23                2.13                   10 mg     Recommend Warfarin 10 mg tonight x1. Daily INR ordered.   Rx will continue to manage therapy per NP's consult order   Yanet Almonte/Armando. 9/23/20 1:32 PM EDT

## 2020-09-23 NOTE — PROGRESS NOTES
Assessment complete. VSS. Bed in the lowest position, call light within reach. No needs expressed at this time. Will continue to monitor.

## 2020-09-23 NOTE — PROGRESS NOTES
[facility] GENERAL SURGERY    Surgery Progress Note           POD # 12    PATIENT NAME: Preeti Rucker     TODAY'S DATE: 9/23/2020    INTERVAL HISTORY:    Pt tired this AM as he was up all night having several episodes liquid diarrhea. No nausea or bloating. OBJECTIVE:   VITALS:  /76   Pulse 102   Temp 98.1 °F (36.7 °C) (Oral)   Resp 18   Ht 6' 5\" (1.956 m)   Wt (!) 400 lb 8 oz (181.7 kg)   SpO2 96%   BMI 47.49 kg/m²     INTAKE/OUTPUT:    I/O last 3 completed shifts: In: 0   Out: 450 [Urine:100; Emesis/NG output:200; Drains:150]  No intake/output data recorded. CONSTITUTIONAL:  awake and alert  LUNGS:  no crackles or wheezing  ABDOMEN:   hypoactive bowel sounds, soft, non-distended, nontender, gisel serous   INCISION: no drainage    Data:  CBC:   Recent Labs     09/21/20  0445   WBC 6.2   HGB 11.8*   HCT 35.0*        BMP:    Recent Labs     09/21/20  0445      K 3.9      CO2 27   BUN 16   CREATININE 0.7*   GLUCOSE 90     Hepatic: No results for input(s): AST, ALT, ALB, BILITOT, ALKPHOS in the last 72 hours. Mag:    No results for input(s): MG in the last 72 hours. Phos:   No results for input(s): PHOS in the last 72 hours. INR:   Recent Labs     09/21/20  0445 09/22/20  0530 09/23/20  0455   INR 2.69* 2.65* 2.13*         ASSESSMENT AND PLAN:  60 y.o. male status post lap appy with postop ileus vs psbo  Clear liquid diet today and see how pt tolerates. Diarrhea: will order Cdiff and stop antibiotics as has had 12 days. Electronically signed by AIDA Pastrana - CNP     Patient seen and agree with above.     Gabbi Watkins MD

## 2020-09-23 NOTE — PROGRESS NOTES
C. diff R/O orders placed by NP. Patient does not meet criteria for stool sample to be sent per hospital protocol. Page sent to surgery group whop ordered the sample. Awaiting response.

## 2020-09-23 NOTE — PROGRESS NOTES
Comprehensive Nutrition Assessment    Type and Reason for Visit:  Reassess    Nutrition Recommendations/Plan:   1. Advance diet as medically feasible per general surgery  2. Add Ensure Clears TID - monitor BG  3. If unable to advance PO diet, recommend initiation of TPN. Consult dietitian for recommendations. 4. Monitor nutrition progression, pertinent labs, bowel habits, wt changes, and clinical progress    Nutrition Assessment:  Follow up: AXR showing persistent/recurreing SBO on 9/21. Pt diet downgraded on 9/18 to NPO, diet advanced today to clears. NGT resumed and removed on 9/22. Per surgery may need TPN. PT nutritionally declining AEB NPO/clear liquid status x5 days. No c/o N/V, cramping or abdominal pain following PO intakes. Currently in c. diff precautions. Will add ONS. Monitor diet advancement and need for TPN. Malnutrition Assessment:  Malnutrition Status: At risk for malnutrition (Comment)      Estimated Daily Nutrient Needs:  Energy (kcal):  2694-1040 kcal; Weight Used for Energy Requirements:  Ideal(94 kg)     Protein (g):   g; Weight Used for Protein Requirements:  Ideal(1.0-1.2 g/kg)        Fluid (ml/day):  1 ml/kcal; Weight Used for Fluid Requirements:  [place]      Nutrition Related Findings:  NGT removed, BG stable, BM x8 in past 24 hrs      Wounds:  Venous Stasis(surgical incisions)       Current Nutrition Therapies:    DIET CLEAR LIQUID;  Current Parenteral Nutrition Orders:  · Goal PN Orders Provides: Clinimix 5/20 at goal rate of 85 mL/hr to provide 1901 kcal and 102 grams protein. 250 mL bags of 20% lipids 3x weekly for additional 214 kcal/day.  GIR=1.56      Anthropometric Measures:  · Height: 6' 5\" (195.6 cm)  · Current Body Weight: 400 lb 8 oz (181.7 kg)   · Admission Body Weight: 417 lb (189.1 kg)(bedscale)    · Usual Body Weight: 420 lb (190.5 kg)(per EMR weight hx)     · Ideal Body Weight: 208 lbs; % Ideal Body Weight 200.5 %   · BMI: 47.5  · BMI Categories: Obese Class 3 (BMI 40.0 or greater)       Nutrition Diagnosis:   · Inadequate oral intake related to lack or limited access to food, altered GI function as evidenced by NPO or clear liquid status due to medical condition      Nutrition Interventions:   Food and/or Nutrient Delivery:  Continue Current Diet(advance as medically feasible, TPN if unable to use GI)  Nutrition Education/Counseling:  Education completed   Coordination of Nutrition Care:  Continued Inpatient Monitoring    Goals:  Pt will consume greater than 50% of meals this admission w/o GI disturbances       Nutrition Monitoring and Evaluation:   Food/Nutrient Intake Outcomes:  Food and Nutrient Intake, Diet Advancement/Tolerance  Physical Signs/Symptoms Outcomes:  Biochemical Data, GI Status, Weight     Discharge Planning:     Too soon to determine     Electronically signed by Laurell Gilford, MS, RD, LD on 9/23/20 at 2:40 PM EDT    Contact: Office: [phone]

## 2020-09-23 NOTE — PROGRESS NOTES
Hospitalist Progress Note      PCP: Caitlyn Ashley    Date of Admission: 9/11/2020    Chief Complaint: hypoxia    Subjective:  Complains of diarrhea. OOB to chair. Says he didn't sleep all night. Medications:  Reviewed    Infusion Medications    dextrose      sodium chloride 75 mL/hr at 09/23/20 0158     Scheduled Medications    metoprolol succinate  50 mg Oral Daily    digoxin  250 mcg Oral Daily    lisinopril  10 mg Oral Nightly    insulin lispro  0-6 Units Subcutaneous TID WC    insulin lispro  0-3 Units Subcutaneous Nightly    allopurinol  100 mg Oral Daily    atorvastatin  10 mg Oral Daily    azelastine  1 spray Each Nostril BID    gabapentin  300 mg Oral TID    levothyroxine  112 mcg Oral Daily    cetirizine  10 mg Oral Daily    montelukast  10 mg Oral Nightly    [Held by provider] spironolactone  25 mg Oral Daily    [Held by provider] torsemide  20 mg Oral Daily    sodium chloride flush  10 mL Intravenous 2 times per day    famotidine (PEPCID) injection  20 mg Intravenous BID     PRN Meds: phenol, glucose, dextrose, metoprolol, calcium carbonate, perflutren lipid microspheres, prochlorperazine, naloxone, glucagon (rDNA), dextrose, HYDROcodone 5 mg - acetaminophen **OR** HYDROcodone 5 mg - acetaminophen, ondansetron, clonazePAM, sodium chloride flush, promethazine **OR** ondansetron, acetaminophen      Intake/Output Summary (Last 24 hours) at 9/23/2020 1257  Last data filed at 9/23/2020 0451  Gross per 24 hour   Intake --   Output 185 ml   Net -185 ml       Exam:    /72   Pulse 93   Temp 98 °F (36.7 °C) (Oral)   Resp 17   Ht 6' 5\" (1.956 m)   Wt (!) 400 lb 8 oz (181.7 kg)   SpO2 95%   BMI 47.49 kg/m²     Gen/overall appearance: Not in acute distress. Alert. Sitting up in chair   Head: Normocephalic, atraumatic  ENT: NG in  Eyes: EOMI, no scleral icterus  CVS: irreg irreg, Normal S1S2  Pulm: Clear to auscultation bilaterally.  No crackles/wheezes  Gastrointestinal: Soft, nontender, obese, no guarding or rebound, lap incisions c/d/i, bowel sound present very distant   Extremities: 1+ BLE edema. No erythema or warmth  Neuro: No gross focal deficits noted  Skin: Warm, dry    Labs:   Recent Labs     09/21/20  0445   WBC 6.2   HGB 11.8*   HCT 35.0*        Recent Labs     09/21/20  0445      K 3.9      CO2 27   BUN 16   CREATININE 0.7*   CALCIUM 9.1     No results for input(s): AST, ALT, BILIDIR, BILITOT, ALKPHOS in the last 72 hours. Recent Labs     09/21/20  0445 09/22/20  0530 09/23/20  0455   INR 2.69* 2.65* 2.13*     No results for input(s): CKTOTAL, TROPONINI in the last 72 hours. Assessment/Plan:    Active Hospital Problems    Diagnosis Date Noted    Ileus following gastrointestinal surgery ([facility] [K91.89, K56.7]     Chronic anticoagulation [Z79.01]     Nonischemic cardiomyopathy ([facility] [I42.8]     Chronic systolic congestive heart failure (HCC) [I50.22]     Atrial fibrillation ([facility] [I48.91]     Hyperlipidemia [E78.5]     Diabetes mellitus ([facility] [E11.9]     Hypertension [I10]     Perforated appendicitis [K35.32] 09/11/2020    Morbid obesity ([facility] [E66.01] 03/05/2020       Acute perforated appendicitis s/p appendectomy complicated by high grade small bowel obstruction  - post op management per general surgery  - diet per GS recs  - meropenem stopped 9/23.  - prn pain management    Acute episode of hypoxia, nausea, chest pain consistent with acute post op hypoxic respiratory failure. Transient and now resolved. Suspect 2/2 IV opiates  - negative troponin  - tele monitoring  - minimize opiates if possible    Acute on chronic Atrial fibrillation. Now with RVR. Improving  - started on amiodarone, then stopped per cards  - po digoxin  - resume metoprolol  [facility] - daily INR - pharmacy to dose.   - ECHO with preserved EF; mild hypokinesis of apical lat wall  - closely monitor and replace lytes  - cardio following     Morbid obesity  - With Body mass index is 38.3 kg/m². Complicating assessment and treatment. Placing patient at risk for multiple co-morbidities as well as early death and contributing to the patient's presentation. Counseled on weight loss     DM2 with peripheral neuropathy, controlled. Hgb a1c 7.1  - ldssi  - poct ac/hs  - hypoglycemia protocol  - carb control diet when eating  - continue gabapentin     HTN and HLD  - continue BB, ACEi. - continue home statin.       Diet: DIET CLEAR LIQUID;  Code Status: Full Code     PT/OT: Home with assist prn    Dispo - per primary team      AIAD Lange - CNP

## 2020-09-23 NOTE — PROGRESS NOTES
[facility] Wound Ostomy Continence Nurse  Follow-up Progress Note       NAME:  Batool Owsley  MEDICAL RECORD NUMBER:  [id number]  AGE:  60 y.o. GENDER:  male  :  1955  TODAY'S DATE:  2020    Subjective:   Wound Identification:  Wound Type: Venous ulcer to Left Lower Lateral Leg  Contributing Factors: edema, venous stasis, diabetes, decreased mobility and obesity        Patient Goal of Care:  [x] Wound Healing  [] Odor Control  [] Palliative Care  [] Pain Control   [] Other:     Objective: Sitting up in chair, legs dependent; L Lower Leg wrapped    /72   Pulse 93   Temp 98 °F (36.7 °C) (Oral)   Resp 17   Ht 6' 5\" (1.956 m)   Wt (!) 400 lb 8 oz (181.7 kg)   SpO2 95%   BMI 47.49 kg/m²   Kaleb Risk Score: Kaleb Scale Score: 17  Assessment: L Lower Leg moist with cluster of open areas; tiny black dots scattered   Measurements:  Wound 20 Leg Left; Lower; Lateral moist open red/brown (Active)   Wound Image   20 1518   Wound Venous 20 1518   Dressing Status New drainage; Changed 20 1518   Dressing Changed Changed/New 20 1518   Dressing/Treatment Other (comment); Roll gauze; Ace wrap 20 1518   Wound Cleansed Rinsed/Irrigated with saline 20 1518   Dressing Change Due 20 1518   Wound Length (cm) 11 cm 20 1528   Wound Width (cm) 7 cm 20 1528   Wound Depth (cm) 0.1 cm 20 1528   Wound Surface Area (cm^2) 77 cm^2 20 1528   Wound Volume (cm^3) 7.7 cm^3 20 1528   Distance Tunneling (cm) 0 cm 20 1528   Tunneling Position ___ O'Clock 0 20 1528   Undermining Starts ___ O'Clock 0 20 1528   Undermining Ends___ O'Clock 0 20 1528   Undermining Maxium Distance (cm) 0 20 1528   Wound Assessment Fragile;Red;Yellow 20   Drainage Amount Moderate 20 151   Drainage Description Serosanguinous 20   Odor Mild 20   Margins Unattached edges 3:20 PM

## 2020-09-24 LAB
GLUCOSE BLD-MCNC: 89 MG/DL (ref 70–99)
GLUCOSE BLD-MCNC: 95 MG/DL (ref 70–99)
GLUCOSE BLD-MCNC: 96 MG/DL (ref 70–99)
GLUCOSE BLD-MCNC: 97 MG/DL (ref 70–99)
INR BLD: 1.88 (ref 0.86–1.14)
PERFORMED ON: NORMAL
PROTHROMBIN TIME: 21.9 SEC (ref 10–13.2)

## 2020-09-24 PROCEDURE — 2500000003 HC RX 250 WO HCPCS: Performed by: SURGERY

## 2020-09-24 PROCEDURE — 1200000000 HC SEMI PRIVATE

## 2020-09-24 PROCEDURE — 2580000003 HC RX 258: Performed by: SURGERY

## 2020-09-24 PROCEDURE — 6370000000 HC RX 637 (ALT 250 FOR IP): Performed by: NURSE PRACTITIONER

## 2020-09-24 PROCEDURE — 99024 POSTOP FOLLOW-UP VISIT: CPT | Performed by: SURGERY

## 2020-09-24 PROCEDURE — 85610 PROTHROMBIN TIME: CPT

## 2020-09-24 PROCEDURE — 99232 SBSQ HOSP IP/OBS MODERATE 35: CPT | Performed by: NURSE PRACTITIONER

## 2020-09-24 PROCEDURE — APPSS45 APP SPLIT SHARED TIME 31-45 MINUTES: Performed by: CLINICAL NURSE SPECIALIST

## 2020-09-24 PROCEDURE — 6370000000 HC RX 637 (ALT 250 FOR IP): Performed by: SURGERY

## 2020-09-24 RX ORDER — WARFARIN SODIUM 5 MG/1
10 TABLET ORAL
Status: COMPLETED | OUTPATIENT
Start: 2020-09-24 | End: 2020-09-24

## 2020-09-24 RX ADMIN — Medication 10 ML: at 20:24

## 2020-09-24 RX ADMIN — WARFARIN SODIUM 10 MG: 5 TABLET ORAL at 17:31

## 2020-09-24 RX ADMIN — FAMOTIDINE 20 MG: 10 INJECTION INTRAVENOUS at 20:14

## 2020-09-24 RX ADMIN — GABAPENTIN 300 MG: 300 CAPSULE ORAL at 08:55

## 2020-09-24 RX ADMIN — SODIUM CHLORIDE: 9 INJECTION, SOLUTION INTRAVENOUS at 05:04

## 2020-09-24 RX ADMIN — METOPROLOL SUCCINATE 50 MG: 50 TABLET, EXTENDED RELEASE ORAL at 08:55

## 2020-09-24 RX ADMIN — ALLOPURINOL 100 MG: 100 TABLET ORAL at 08:55

## 2020-09-24 RX ADMIN — LISINOPRIL 10 MG: 10 TABLET ORAL at 20:13

## 2020-09-24 RX ADMIN — ATORVASTATIN CALCIUM 10 MG: 10 TABLET, FILM COATED ORAL at 20:13

## 2020-09-24 RX ADMIN — LEVOTHYROXINE SODIUM 112 MCG: 0.11 TABLET ORAL at 08:55

## 2020-09-24 RX ADMIN — DIGOXIN 250 MCG: 125 TABLET ORAL at 08:54

## 2020-09-24 RX ADMIN — FAMOTIDINE 20 MG: 10 INJECTION INTRAVENOUS at 08:54

## 2020-09-24 ASSESSMENT — ENCOUNTER SYMPTOMS
RESPIRATORY NEGATIVE: 1
ABDOMINAL PAIN: 1
ABDOMINAL DISTENTION: 1

## 2020-09-24 ASSESSMENT — PAIN SCALES - GENERAL
PAINLEVEL_OUTOF10: 0

## 2020-09-24 NOTE — FLOWSHEET NOTE
09/24/20 1030   Encounter Summary   Services provided to: Patient   Referral/Consult From: [facility] Family members   Continue Visiting   (9/24/Phone support.  Pleasant conversation.)   Complexity of Encounter Moderate   Length of Encounter 15 minutes   Spiritual/Restoration   Type Spiritual support   Assessment Approachable   Intervention Active listening;Explored feelings, thoughts, concerns;Nurtured hope   Outcome Expressed gratitude;Engaged in conversation

## 2020-09-24 NOTE — PROGRESS NOTES
Spoke with Montez Patterson, ok to advance diet. Also informed her that pt is adamant about leaving today.

## 2020-09-24 NOTE — PROGRESS NOTES
Hospitalist Progress Note      PCP: Carry Denver    Date of Admission: 9/11/2020    Chief Complaint: hypoxia    Subjective:  Feels well. Hoping to go home. Medications:  Reviewed    Infusion Medications    dextrose       Scheduled Medications    warfarin  10 mg Oral Once    warfarin (COUMADIN) daily dosing (placeholder)   Other RX Placeholder    metoprolol succinate  50 mg Oral Daily    digoxin  250 mcg Oral Daily    lisinopril  10 mg Oral Nightly    insulin lispro  0-6 Units Subcutaneous TID WC    insulin lispro  0-3 Units Subcutaneous Nightly    allopurinol  100 mg Oral Daily    atorvastatin  10 mg Oral Daily    azelastine  1 spray Each Nostril BID    gabapentin  300 mg Oral TID    levothyroxine  112 mcg Oral Daily    cetirizine  10 mg Oral Daily    montelukast  10 mg Oral Nightly    [Held by provider] spironolactone  25 mg Oral Daily    [Held by provider] torsemide  20 mg Oral Daily    sodium chloride flush  10 mL Intravenous 2 times per day    famotidine (PEPCID) injection  20 mg Intravenous BID     PRN Meds: phenol, glucose, dextrose, metoprolol, calcium carbonate, perflutren lipid microspheres, prochlorperazine, naloxone, glucagon (rDNA), dextrose, HYDROcodone 5 mg - acetaminophen **OR** HYDROcodone 5 mg - acetaminophen, ondansetron, clonazePAM, sodium chloride flush, promethazine **OR** ondansetron, acetaminophen      Intake/Output Summary (Last 24 hours) at 9/24/2020 1700  Last data filed at 9/24/2020 1301  Gross per 24 hour   Intake 648 ml   Output 980 ml   Net -332 ml       Exam:    /79   Pulse 99   Temp 97.7 °F (36.5 °C) (Oral)   Resp 22   Ht 6' 5\" (1.956 m)   Wt (!) 402 lb 8 oz (182.6 kg)   SpO2 98%   BMI 47.73 kg/m²     Gen/overall appearance: Not in acute distress. Alert. Sitting up in chair   Head: Normocephalic, atraumatic  ENT: NG in  Eyes: EOMI, no scleral icterus  CVS: irreg irreg, Normal S1S2  Pulm: Clear to auscultation bilaterally.  No crackles/wheezes  Gastrointestinal: Soft, nontender, obese, no guarding or rebound, lap incisions c/d/i, bowel sound present very distant   Extremities: 1+ BLE edema. No erythema or warmth  Neuro: No gross focal deficits noted  Skin: Warm, dry    Labs:   Recent Labs     09/23/20 1815   WBC 6.8   HGB 11.2*   HCT 34.5*        Recent Labs     09/23/20  1815      K 3.9      CO2 23   BUN 13   CREATININE 0.7*   CALCIUM 8.0*     No results for input(s): AST, ALT, BILIDIR, BILITOT, ALKPHOS in the last 72 hours. Recent Labs     09/23/20  0455 09/23/20  1815 09/24/20  1250   INR 2.13* 1.94* 1.88*     No results for input(s): CKTOTAL, TROPONINI in the last 72 hours. Assessment/Plan:    Active Hospital Problems    Diagnosis Date Noted    Ileus following gastrointestinal surgery ([facility] [address] [K91.89, K56.7]     Chronic anticoagulation [Z79.01]     Nonischemic cardiomyopathy ([facility] [address] [I42.8]     Chronic systolic congestive heart failure (HCC) [I50.22]     Atrial fibrillation ([facility] [address] [I48.91]     Hyperlipidemia [E78.5]     Diabetes mellitus ([facility] [address] [E11.9]     Hypertension [I10]     Perforated appendicitis [K35.32] 09/11/2020    Morbid obesity ([facility] [address] [E66.01] 03/05/2020       Acute perforated appendicitis s/p appendectomy complicated by high grade small bowel obstruction  - post op management per general surgery  - diet per GS recs  - meropenem stopped 9/23.  - prn pain management    Acute episode of hypoxia, nausea, chest pain consistent with acute post op hypoxic respiratory failure. Transient and now resolved. Suspect 2/2 IV opiates  - negative troponin  - tele monitoring  - minimize opiates if possible    Acute on chronic Atrial fibrillation. Now with RVR. Improving  - started on amiodarone, then stopped per cards  - po digoxin  - resume metoprolol  [facility] - daily INR - pharmacy to dose.   - ECHO with preserved EF; mild hypokinesis of apical lat wall  - closely monitor and replace

## 2020-09-24 NOTE — PROGRESS NOTES
[address]  Cardiology  Progress Note    Admission date:  2020    Reason for follow up visit: AF    HPI/CC: Ameya Goel is a 60 y.o. male who was admitted 2020 for abdominal pain, found to have appendicitis and had appendectomy 2020. He has been treated for possible SBO. Noted to have AF RVR. Echo showed EF 50-55%. Rhythm overnight has been AF. Subjective: Denies chest pain, shortness of breath, palpitations and dizziness. Wants to go home. Vitals:  Blood pressure 119/81, pulse 89, temperature 97.7 °F (36.5 °C), temperature source Oral, resp. rate 16, height 6' 5\" (1.956 m), weight (!) 402 lb 8 oz (182.6 kg), SpO2 96 %.   Temp  Av.8 °F (36.6 °C)  Min: 97.3 °F (36.3 °C)  Max: 98.2 °F (36.8 °C)  Pulse  Av.3  Min: 73  Max: 96  BP  Min: 97/65  Max: 119/81  SpO2  Av.8 %  Min: 93 %  Max: 96 %    24 hour I/O    Intake/Output Summary (Last 24 hours) at 2020 1118  Last data filed at 2020 0734  Gross per 24 hour   Intake 488 ml   Output 1080 ml   Net -592 ml     Current Facility-Administered Medications   Medication Dose Route Frequency Provider Last Rate Last Dose    warfarin (COUMADIN) tablet 10 mg  10 mg Oral Once AIDA Terrell - CNP        warfarin (COUMADIN) daily dosing (placeholder)   Other RX Placeholder AIDA Terrell - CNP        phenol 1.4 % mouth spray 1 spray  1 spray Mouth/Throat Q2H PRN Venecia Furl, APRN - CNP        metoprolol succinate (TOPROL XL) extended release tablet 50 mg  50 mg Oral Daily Chilo Sever, APRN - CNP   50 mg at 20 0855    glucose (GLUTOSE) 40 % oral gel 15 g  15 g Oral PRN Venecia Furl, APRN - CNP        dextrose 50 % IV solution  12.5 g Intravenous PRN Venecia Furl, APRN - CNP        digoxin (LANOXIN) tablet 250 mcg  250 mcg Oral Daily Lalla Diane, APRN - CNP   250 mcg at 20 0854    metoprolol (LOPRESSOR) injection 5 mg  5 mg Intravenous Q6H PRN AIDA Aldana - JESUS Piedra lisinopril (PRINIVIL;ZESTRIL) tablet 10 mg  10 mg Oral Nightly AIDA Stroud - CNP   10 mg at 09/21/20 2034    calcium carbonate (TUMS) chewable tablet 500 mg  500 mg Oral TID PRN Javi Jasmine MD   500 mg at 09/18/20 0422    perflutren lipid microspheres (DEFINITY) injection 1.65 mg  1.5 mL Intravenous ONCE PRN Demar Palomino MD        prochlorperazine (COMPAZINE) injection 10 mg  10 mg Intravenous Q6H PRN Javi Jasmine MD   10 mg at 09/18/20 1434    naloxone [facility]) injection 0.4 mg  0.4 mg Intravenous PRN Javi Jasmine MD        insulin lispro (HUMALOG) injection vial 0-6 Units  0-6 Units Subcutaneous TID WC Sapna Duncan, APRN - CNP        insulin lispro (HUMALOG) injection vial 0-3 Units  0-3 Units Subcutaneous Nightly Sapna Duncan, APRN - CNP        glucagon (rDNA) injection 1 mg  1 mg Intramuscular PRN Sapna Perla, APRN - CNP        dextrose 5 % solution  100 mL/hr Intravenous PRN [place] Duncan, APRN - CNP        HYDROcodone-acetaminophen (NORCO) 5-325 MG per tablet 1 tablet  1 tablet Oral Q4H PRN Javi Jasmine MD        Or    HYDROcodone-acetaminophen [facility]) 5-325 MG per tablet 2 tablet  2 tablet Oral Q4H PRN Javi Jasmine MD   2 tablet at 09/16/20 0718    ondansetron (ZOFRAN) injection 4 mg  4 mg Intravenous Q30 Min PRN AIDA Galindo - CNP   4 mg at 09/11/20 1144    allopurinol (ZYLOPRIM) tablet 100 mg  100 mg Oral Daily Javi Jasmine MD   100 mg at 09/24/20 0855    atorvastatin (LIPITOR) tablet 10 mg  10 mg Oral Daily Javi Jasmine MD   10 mg at 09/23/20 2105    azelastine (ASTELIN) 0.1 % nasal spray 1 spray  1 spray Each Nostril BID Javi Jasmine MD   1 spray at 09/19/20 0910    clonazePAM (KLONOPIN) tablet 0.5 mg  0.5 mg Oral BID PRN Javi Jasmine MD        gabapentin (NEURONTIN) capsule 300 mg  300 mg Oral TID Rozd MD Kenroy   300 mg at 09/24/20 8630  levothyroxine (SYNTHROID) tablet 112 mcg  112 mcg Oral Daily Brooke Hogan MD   112 mcg at 09/24/20 0855    cetirizine (ZYRTEC) tablet 10 mg  10 mg Oral Daily Brooke Hogan MD   10 mg at 09/17/20 0926    montelukast (SINGULAIR) tablet 10 mg  10 mg Oral Nightly Brooke Hogan MD   10 mg at 09/16/20 2056    [Held by provider] spironolactone (ALDACTONE) tablet 25 mg  25 mg Oral Daily Brooke Hogan MD   Stopped at 09/17/20 0933    [Held by provider] torsemide (DEMADEX) tablet 20 mg  20 mg Oral Daily Brooke Hogan MD   Stopped at 09/23/20 0900    sodium chloride flush 0.9 % injection 10 mL  10 mL Intravenous 2 times per day Brooke Hogan MD   10 mL at 09/23/20 2105    sodium chloride flush 0.9 % injection 10 mL  10 mL Intravenous PRN Brooke Hogan MD   10 mL at 09/18/20 1757    promethazine (PHENERGAN) tablet 12.5 mg  12.5 mg Oral Q6H PRN Brooke Hogan MD   12.5 mg at 09/16/20 0636    Or    ondansetron (ZOFRAN) injection 4 mg  4 mg Intravenous Q6H PRN Brooke Hogan MD   4 mg at 09/18/20 1757    0.9 % sodium chloride infusion   Intravenous Continuous Brooke Hogan MD 75 mL/hr at 09/24/20 0504      acetaminophen (TYLENOL) tablet 650 mg  650 mg Oral Q4H PRN Brooke Hogan MD        famotidine (PEPCID) injection 20 mg  20 mg Intravenous BID Brooke Hogan MD   20 mg at 09/24/20 3928     Facility-Administered Medications Ordered in Other Encounters   Medication Dose Route Frequency Provider Last Rate Last Dose    lidocaine (LMX) 4 % cream   Topical Once Corinna Fajardo DPM         Review of Systems   Constitutional: Negative. Respiratory: Negative. Cardiovascular: Negative. Gastrointestinal: Positive for abdominal distention and abdominal pain. Neurological: Negative.       Objective:     Telemetry monitor: AF    Physical Exam:  Constitutional:  Comfortable and alert, NAD, appears stated age, obese  Eyes: PERRL, sclera nonicteric  Neck:  Supple, no masses, no thyroidmegaly, no JVD  Skin:  Warm and dry; no rash or lesions  Heart: Irregular, normal apex, S1 and S2 normal, no M/G/R  Lungs:  Normal respiratory effort; clear; no wheezing/rhonchi/rales  Abdomen: soft, non tender, + bowel sounds  Extremities:  No edema or cyanosis; no clubbing  Neuro: alert and oriented, moves legs and arms equally, normal mood and affect    Data Reviewed:    Echo 9/17/2020:  Technical difficult study due to body habitus. Left ventricular systolic function is low normal with a visually estimated   ejection fraction of 50-55%. There is mild hypokinesis of the apical lateral   wall. The left ventricle is normal in size with mild concentric hypertrophy. Indeterminate diastolic function due to atrial fibrillation. Mitral valve leaflets appear mildly thickened and calcified but open   adequately. Mild mitral regurgitation. Aortic valve sclerosis with normal opening. No evidence of aortic valve regurgitation. The right atrium is enlarged. Right atrial area is 24.8 cm2.     Lab Reviewed:     Renal Profile:  Lab Results   Component Value Date    CREATININE 0.7 09/23/2020    BUN 13 09/23/2020     09/23/2020    K 3.9 09/23/2020     09/23/2020    CO2 23 09/23/2020     CBC:    Lab Results   Component Value Date    WBC 6.8 09/23/2020    RBC 3.77 09/23/2020    HGB 11.2 09/23/2020    HCT 34.5 09/23/2020    MCV 91.6 09/23/2020    RDW 14.6 09/23/2020     09/23/2020     BNP:  No results found for: PROBNP  Fasting Lipid Panel:  No results found for: CHOL, HDL, TRIG  Cardiac Enzymes:  CK/MbTroponin  Lab Results   Component Value Date    TROPONINI <0.01 09/13/2020     PT/ INR   Lab Results   Component Value Date    INR 1.94 09/23/2020    INR 2.13 09/23/2020    INR 2.65 09/22/2020    PROTIME 22.6 09/23/2020    PROTIME 24.9 09/23/2020    PROTIME 31.0 09/22/2020     PTT No results

## 2020-09-24 NOTE — PROGRESS NOTES
[facility] GENERAL SURGERY    Surgery Progress Note           POD # 13    PATIENT NAME: Guillermo Lozada     TODAY'S DATE: 9/24/2020    INTERVAL HISTORY:    Pt doing well this AM - continues to have loose stools, and pass flatus. No nausea - matty clear liquids. Wants to go home today, however is will to stay until tomorrow if needed if his diet is advanced. OBJECTIVE:   VITALS:  /77   Pulse 73   Temp 97.6 °F (36.4 °C) (Oral)   Resp 17   Ht 6' 5\" (1.956 m)   Wt (!) 402 lb 8 oz (182.6 kg)   SpO2 96%   BMI 47.73 kg/m²     INTAKE/OUTPUT:    I/O last 3 completed shifts: In: 56 [P.O.:488]  Out: 1020 [Urine:950; Drains:70]  I/O this shift:  In: -   Out: 61 [Drains:60]              CONSTITUTIONAL:  awake and alert  LUNGS:  no crackles or wheezing  ABDOMEN:   soft, non-distended, nontender, gisel - removed intact without difficulty. INCISION: no drainage, no erythema. Data:  CBC:   Recent Labs     09/23/20  1815   WBC 6.8   HGB 11.2*   HCT 34.5*        BMP:    Recent Labs     09/23/20  1815      K 3.9      CO2 23   BUN 13   CREATININE 0.7*   GLUCOSE 94     Hepatic: No results for input(s): AST, ALT, ALB, BILITOT, ALKPHOS in the last 72 hours. Mag:    No results for input(s): MG in the last 72 hours. Phos:   No results for input(s): PHOS in the last 72 hours. INR:   Recent Labs     09/22/20  0530 09/23/20  0455 09/23/20  1815   INR 2.65* 2.13* 1.94*         ASSESSMENT AND PLAN:  60 y.o. male status post lap appy with postop ileus vs psbo    Will advance to low residue die today     Diarrhea: Cdiff not sent due to ID parameters. Antibiotics stopped yesterday. Dispo: if he tolerates lunch, possibly d/c later today - or in AM.     Electronically signed by AIDA Osorio - CNP     Patient seen and agree with above.   Plan home tomorrow    Som Hendrix MD

## 2020-09-25 VITALS
SYSTOLIC BLOOD PRESSURE: 105 MMHG | HEIGHT: 77 IN | TEMPERATURE: 97.6 F | BODY MASS INDEX: 37.19 KG/M2 | RESPIRATION RATE: 20 BRPM | OXYGEN SATURATION: 97 % | DIASTOLIC BLOOD PRESSURE: 74 MMHG | WEIGHT: 315 LBS | HEART RATE: 97 BPM

## 2020-09-25 LAB
INR BLD: 2.16 (ref 0.86–1.14)
PROTHROMBIN TIME: 25.2 SEC (ref 10–13.2)

## 2020-09-25 PROCEDURE — 6370000000 HC RX 637 (ALT 250 FOR IP): Performed by: NURSE PRACTITIONER

## 2020-09-25 PROCEDURE — 2500000003 HC RX 250 WO HCPCS: Performed by: SURGERY

## 2020-09-25 PROCEDURE — 85610 PROTHROMBIN TIME: CPT

## 2020-09-25 PROCEDURE — 99024 POSTOP FOLLOW-UP VISIT: CPT | Performed by: SURGERY

## 2020-09-25 PROCEDURE — 36592 COLLECT BLOOD FROM PICC: CPT

## 2020-09-25 PROCEDURE — 2580000003 HC RX 258: Performed by: SURGERY

## 2020-09-25 PROCEDURE — 6370000000 HC RX 637 (ALT 250 FOR IP): Performed by: SURGERY

## 2020-09-25 RX ORDER — OXYCODONE HYDROCHLORIDE 5 MG/1
5 TABLET ORAL EVERY 6 HOURS PRN
Qty: 12 TABLET | Refills: 0 | Status: SHIPPED | OUTPATIENT
Start: 2020-09-25 | End: 2020-09-28

## 2020-09-25 RX ORDER — WARFARIN SODIUM 5 MG/1
10 TABLET ORAL
Status: DISCONTINUED | OUTPATIENT
Start: 2020-09-25 | End: 2020-09-25 | Stop reason: HOSPADM

## 2020-09-25 RX ADMIN — Medication 10 ML: at 08:24

## 2020-09-25 RX ADMIN — FAMOTIDINE 20 MG: 10 INJECTION INTRAVENOUS at 08:24

## 2020-09-25 RX ADMIN — DIGOXIN 250 MCG: 125 TABLET ORAL at 08:23

## 2020-09-25 RX ADMIN — METOPROLOL SUCCINATE 50 MG: 50 TABLET, EXTENDED RELEASE ORAL at 08:23

## 2020-09-25 RX ADMIN — ALLOPURINOL 100 MG: 100 TABLET ORAL at 08:23

## 2020-09-25 RX ADMIN — LEVOTHYROXINE SODIUM 112 MCG: 0.11 TABLET ORAL at 08:23

## 2020-09-25 ASSESSMENT — PAIN SCALES - GENERAL
PAINLEVEL_OUTOF10: 0
PAINLEVEL_OUTOF10: 0

## 2020-09-25 NOTE — PROGRESS NOTES
Pt A&Ox4 VSS on RA. Shift assessment complete. Denies any pain or needs at this time. Call light in reach, bedside table in reach. Bed in lowest position and locked. Will continue to monitor.  Electronically signed by Roberto Ram RN on 9/24/2020 at 11:17 PM

## 2020-09-25 NOTE — DISCHARGE SUMMARY
Surgery Discharge Summary    Patient Identification  Arsenio Thurman is a 60 y.o. male. :  1955  Admit Date:  2020    Discharge date:   No discharge date for patient encounter. Disposition: home    Discharge Diagnoses:   Principal Problem:    Perforated appendicitis  Active Problems: Morbid obesity ([facility] [address] [facility]    Atrial fibrillation ([facility]    Hyperlipidemia    Diabetes mellitus ([facility]    Hypertension    Chronic anticoagulation    Nonischemic cardiomyopathy (HCC)    Chronic systolic congestive heart failure (HCC)    Ileus following gastrointestinal surgery ([facility]  Resolved Problems:    * No resolved hospital problems. *      Discharge condition: good    Discharge Medications:     Current Discharge Medication List      CONTINUE these medications which have NOT CHANGED    Details   ALLOPURINOL PO Take 100 mg by mouth daily       testosterone (ANDROGEL; TESTIM) 50 MG/5GM (1%) GEL 1% gel Apply topically daily. spironolactone (ALDACTONE) 25 MG tablet Take 25 mg by mouth daily      atorvastatin (LIPITOR) 10 MG tablet Take 10 mg by mouth daily       clonazePAM (KLONOPIN) 1 MG tablet Take one-half tablet by mouth twice a day as needed for anxiety      digoxin (LANOXIN) 250 MCG tablet Take 125 mcg by mouth daily       gabapentin (NEURONTIN) 300 MG capsule Take 300 mg by mouth 3 times daily.        Levothyroxine Sodium 112 MCG CAPS Take 112 mcg by mouth daily       lisinopril (PRINIVIL;ZESTRIL) 40 MG tablet Take 30 mg by mouth daily       loratadine (CLARITIN) 10 MG tablet Take 10 mg by mouth daily       metoprolol succinate (TOPROL XL) 50 MG extended release tablet Take 200 mg by mouth daily       montelukast (SINGULAIR) 10 MG tablet Take 10 mg by mouth nightly       torsemide (DEMADEX) 20 MG tablet Take 20 mg by mouth daily       vardenafil (LEVITRA) 20 MG tablet Take 1/2 tablet by mouth one hour before sexual activity      warfarin (COUMADIN) 5 MG tablet 5 mg Indications: 2 5mg tablet every day but wednesday and on wednesday take 12.5 mg       azelastine (ASTELIN) 0.1 % nasal spray 1 spray by Nasal route 2 times daily Use in each nostril as directed                Current Discharge Medication List      START taking these medications    Details   oxyCODONE (ROXICODONE) 5 MG immediate release tablet Take 1 tablet by mouth every 6 hours as needed for Pain for up to 3 days. Intended supply: 3 days. Take lowest dose possible to manage pain  Qty: 12 tablet, Refills: 0    Comments: Reduce doses taken as pain becomes manageable  Associated Diagnoses: Acute appendicitis with localized peritonitis, without perforation, abscess, or gangrene               Most Recent Labs:    CBC:   Recent Labs     09/23/20  1815   WBC 6.8   HGB 11.2*   HCT 34.5*        BMP:    Recent Labs     09/23/20  1815      K 3.9      CO2 23   BUN 13   CREATININE 0.7*   GLUCOSE 94     Hepatic: No results for input(s): AST, ALT, ALB, BILITOT, ALKPHOS in the last 72 hours. PT/INR:    Recent Labs     09/23/20  1815 09/24/20  1250 09/25/20  0431   INR 1.94* 1.88* 2.16*         Consults: IM, Cardiology    Surgery: lap appy    Patient Instructions: Activity: no heavy lifting, pushing, pulling for 1 weeks, no driving for 1  weeks or while on analgesics  Diet: As tolerated  Follow-up with Alan Yen in 2 weeks. The patient and/or family/patient representatives, were provided education regarding discharge instructions, ongoing treatment and follow-up. Details of information given to the patient may be found in the discharge instructions located in the EMR. HPI and Hospital Course:   Patient admitted after above operation for perforated appendicitis. Continued on IV abx. Developed postop ileus requiring NG placement multiple times. Follow up imaging did not show any abscess and eventually ileus resolved. Discharged home in stable condition.       [facility] Surgery

## 2020-09-25 NOTE — PROGRESS NOTES
PICC removed without complications. Pressure held for 3 mins, minimal bleeding at site. Tip intact at 50 cm. Occlusive dressing applied.

## 2020-09-25 NOTE — PROGRESS NOTES
Patient discharged. PICC removed without complications. Tele removed and returned. All belongings collected. Discharge instructions reviewed with patient. Discharged to home via private vehicle. C to follow.

## 2020-09-25 NOTE — CARE COORDINATION
Case Management  Discharge Summary      DISCHARGE TO: Home with Home Health Care: [address]: Family     ORDERS FAXED TO:  D/C faxed and called to [address]: N-A    PRE-CERTIFICATION OBTAINED: N-A    NURSE RESPONSIBLE FOR COMPLETION OF PAGE ONE OF CONTINUITY OF CARE (BETTINA) FORM, RECONCILIATION OF MEDICATIONS, AND TO PLACE A COPY OF FORMS IN PATIENT'S HARD CHART. NURSE TO ENSURE THAT ANY WRITTEN PRESCRIPTIONS ARE GIVEN TO PT UPON DISCHARGE. Patient aware of and agreeable to all arrangements and states no further discharge planning needs.

## 2020-09-25 NOTE — PLAN OF CARE
Problem: Falls - Risk of:  Goal: Will remain free from falls  Description: Will remain free from falls  Outcome: Ongoing   Pt A&Ox4 calls out appop on RA. Call light in reach, bedside table in reach, bed in lowest position and locked.

## 2020-09-26 ENCOUNTER — CARE COORDINATION (OUTPATIENT)
Dept: CASE MANAGEMENT | Age: 65
End: 2020-09-26

## 2020-09-26 NOTE — CARE COORDINATION
Patient contacted regarding Akila Blakely. Discussed COVID-19 related testing which was not done at this time. Test results were not done. Patient informed of results, if available? Yes    Care Transition Nurse/ Ambulatory Care Manager contacted the patient by telephone to perform post discharge assessment. Call within 2 business days of discharge: Yes. Verified name and  with patient as identifiers. Provided introduction to self, and explanation of the CTN/ACM role, and reason for call due to risk factors for infection and/or exposure to COVID-19. Symptoms reviewed with patient who verbalized the following symptoms: no new symptoms and no worsening symptoms. Due to no new or worsening symptoms encounter was not routed to provider for escalation. Discussed follow-up appointments. If no appointment was previously scheduled, appointment scheduling offered: [facility] follow up appointment(s): No future appointments. Non-[facility] follow up appointment(s):     Non-face-to-face services provided:  Obtained and reviewed discharge summary and/or continuity of care documents  Communication with home health agencies or other community services the patient is currently using-[facility].      Advance Care Planning:   Does patient have an Advance Directive:  not on file. Patient has following risk factors of: heart failure, asthma and diabetes. CTN/ACM reviewed discharge instructions, medical action plan and red flags such as increased shortness of breath, increasing fever and signs of decompensation with patient who verbalized understanding. Discussed exposure protocols and quarantine with CDC Guidelines What to do if you are sick with coronavirus disease 2019.  Patient was given an opportunity for questions and concerns.  The patient agrees to contact the [facility] exposure line [phone], local [facility] department [place] Department of Health: ([phone]) and PCP office for questions related to their healthcare. CTN/ACM provided contact information for future needs. Reviewed and educated patient on any new and changed medications related to discharge diagnosis         Plan for follow-up call in 5-7 days based on severity of symptoms and risk factors. Spoke with patient who reported he is doing alright and was up last night having diarrhea so he just was trying to lay down to get some rest. Patient stated he was told by the surgeon he can take imodium and will get some today. Patient to also increase fluids to prevent dehydration. CTN reviewed new medication and patient reported he hasn't picked up pain prescription yet and tolerating pain fine at this time. CTN reviewed discharge instructions and patient denied any questions or concerns. Patient to call surgeon on Monday to schedule follow up apt. Denies any acute needs at present time. Agreeable to f/u calls. Educated on the use of urgent care or physicians 24 hr access line if assistance is needed after hours & that they can always contact their home care provider to request a nurse visit even if it isn't their regularly scheduled day for their nurse to visit. CTN contacted [facility] and left  for intake. CTN contacted [facility] again and spoke with James Marc who verified [address] orders were received and patient is being covered.      Vick HAYWOODN, RN, [facility]  Care Transition Nurse   [phone]

## 2020-10-02 ENCOUNTER — CARE COORDINATION (OUTPATIENT)
Dept: CASE MANAGEMENT | Age: 65
End: 2020-10-02

## 2020-10-02 NOTE — CARE COORDINATION
You Patient resolved from the Care Transitions episode on 10/2/2020  Discussed COVID-19 related testing which was not done at this time. Test results were not done. Patient informed of results, if available? Yes    Patient/family has been provided the following resources and education related to COVID-19:                         Signs, symptoms and red flags related to COVID-19            CDC exposure and quarantine guidelines            [facility] exposure contact - [phone]            Contact for their local Department of Health                 Patient currently reports that the following symptoms have improved:  no new/worsening symptoms     No further outreach scheduled with this CTN/ACM. Episode of Care resolved. Patient has this CTN/ACM contact information if future needs arise. Spoke with patient who reported he is doing alright. Patient stated the diarrhea has subsided and he is tolerating bland diet without any issues. Patient stated his incision sites are healing well and [facility] remains active. Patient has apt with Dr. Kanchan Bangura on 10/8. Patient denied any further issues or concerns at this time. CTN advised patient of use of urgent care or physicians 24 hr access line if assistance is needed after hours. Also advised that they can always contact their home care provider to request a nurse visit even if it isn't their regularly scheduled day for their nurse to visit.      Marcin KNOWLES, RN, [facility]  Care Transition Nurse   [phone]

## 2020-10-08 ENCOUNTER — OFFICE VISIT (OUTPATIENT)
Dept: SURGERY | Age: 65
End: 2020-10-08

## 2020-10-08 VITALS
WEIGHT: 315 LBS | DIASTOLIC BLOOD PRESSURE: 63 MMHG | BODY MASS INDEX: 37.19 KG/M2 | HEIGHT: 77 IN | TEMPERATURE: 98 F | SYSTOLIC BLOOD PRESSURE: 102 MMHG | HEART RATE: 85 BPM

## 2020-10-08 PROCEDURE — 99024 POSTOP FOLLOW-UP VISIT: CPT | Performed by: SURGERY

## 2020-10-08 NOTE — PROGRESS NOTES
HPI: Nursing notes reviewed. Patient is a 59 yo who underwent laparoscopic appendectomy at [facility].   Reports no pain and no nausea. Energy is normal.  no diarrhea and some constipation. ROS:  10 point review of systems performed with pertinent positives in HPI    Phys:    Abd - soft, nontender, nondistended   Incisions - no erythema    Assesment: 59 yo s/p laparoscopic appendectomy    Plan: 1. Doing well postop with no pain and no nausea   2. No activity limitations   3. No dietary restrictions   4.   Laxative for constipation

## 2021-05-12 ENCOUNTER — APPOINTMENT (OUTPATIENT)
Dept: GENERAL RADIOLOGY | Age: 66
DRG: 445 | End: 2021-05-12
Payer: OTHER GOVERNMENT

## 2021-05-12 ENCOUNTER — APPOINTMENT (OUTPATIENT)
Dept: ULTRASOUND IMAGING | Age: 66
DRG: 445 | End: 2021-05-12
Payer: OTHER GOVERNMENT

## 2021-05-12 ENCOUNTER — HOSPITAL ENCOUNTER (INPATIENT)
Age: 66
LOS: 1 days | Discharge: HOME | DRG: 445 | End: 2021-05-13
Attending: EMERGENCY MEDICINE | Admitting: INTERNAL MEDICINE
Payer: OTHER GOVERNMENT

## 2021-05-12 ENCOUNTER — APPOINTMENT (OUTPATIENT)
Dept: CT IMAGING | Age: 66
DRG: 445 | End: 2021-05-12
Payer: OTHER GOVERNMENT

## 2021-05-12 DIAGNOSIS — K80.00 CALCULUS OF GALLBLADDER WITH ACUTE CHOLECYSTITIS WITHOUT OBSTRUCTION: ICD-10-CM

## 2021-05-12 DIAGNOSIS — R10.13 ABDOMINAL PAIN, EPIGASTRIC: Primary | ICD-10-CM

## 2021-05-12 PROBLEM — K81.0 ACUTE CHOLECYSTITIS: Status: ACTIVE | Noted: 2021-05-12

## 2021-05-12 LAB
A/G RATIO: 1.1 (ref 1.1–2.2)
ALBUMIN SERPL-MCNC: 3.9 G/DL (ref 3.4–5)
ALP BLD-CCNC: 64 U/L (ref 40–129)
ALT SERPL-CCNC: 59 U/L (ref 10–40)
ANION GAP SERPL CALCULATED.3IONS-SCNC: 7 MMOL/L (ref 3–16)
AST SERPL-CCNC: 81 U/L (ref 15–37)
BASOPHILS ABSOLUTE: 0 K/UL (ref 0–0.2)
BASOPHILS RELATIVE PERCENT: 0.6 %
BILIRUB SERPL-MCNC: 1.2 MG/DL (ref 0–1)
BILIRUBIN URINE: NEGATIVE
BLOOD, URINE: NEGATIVE
BUN BLDV-MCNC: 20 MG/DL (ref 7–20)
CALCIUM SERPL-MCNC: 9.5 MG/DL (ref 8.3–10.6)
CHLORIDE BLD-SCNC: 103 MMOL/L (ref 99–110)
CLARITY: CLEAR
CO2: 27 MMOL/L (ref 21–32)
COLOR: YELLOW
CREAT SERPL-MCNC: 0.7 MG/DL (ref 0.8–1.3)
EKG ATRIAL RATE: 110 BPM
EKG DIAGNOSIS: NORMAL
EKG Q-T INTERVAL: 376 MS
EKG QRS DURATION: 104 MS
EKG QTC CALCULATION (BAZETT): 457 MS
EKG R AXIS: 68 DEGREES
EKG T AXIS: -7 DEGREES
EKG VENTRICULAR RATE: 89 BPM
EOSINOPHILS ABSOLUTE: 0.1 K/UL (ref 0–0.6)
EOSINOPHILS RELATIVE PERCENT: 1.2 %
GFR AFRICAN AMERICAN: >60
GFR NON-AFRICAN AMERICAN: >60
GLOBULIN: 3.7 G/DL
GLUCOSE BLD-MCNC: 161 MG/DL (ref 70–99)
GLUCOSE URINE: NEGATIVE MG/DL
HCT VFR BLD CALC: 35 % (ref 40.5–52.5)
HEMOGLOBIN: 11.6 G/DL (ref 13.5–17.5)
INR BLD: 2.1 (ref 0.86–1.14)
KETONES, URINE: NEGATIVE MG/DL
LACTIC ACID: 2.3 MMOL/L (ref 0.4–2)
LEUKOCYTE ESTERASE, URINE: NEGATIVE
LIPASE: 51 U/L (ref 13–60)
LYMPHOCYTES ABSOLUTE: 1.3 K/UL (ref 1–5.1)
LYMPHOCYTES RELATIVE PERCENT: 18.4 %
MCH RBC QN AUTO: 30 PG (ref 26–34)
MCHC RBC AUTO-ENTMCNC: 33.1 G/DL (ref 31–36)
MCV RBC AUTO: 90.7 FL (ref 80–100)
MICROSCOPIC EXAMINATION: NORMAL
MONOCYTES ABSOLUTE: 0.4 K/UL (ref 0–1.3)
MONOCYTES RELATIVE PERCENT: 6 %
NEUTROPHILS ABSOLUTE: 5.1 K/UL (ref 1.7–7.7)
NEUTROPHILS RELATIVE PERCENT: 73.8 %
NITRITE, URINE: NEGATIVE
PDW BLD-RTO: 15.6 % (ref 12.4–15.4)
PH UA: 5.5 (ref 5–8)
PLATELET # BLD: 154 K/UL (ref 135–450)
PMV BLD AUTO: 9 FL (ref 5–10.5)
POTASSIUM SERPL-SCNC: 4.5 MMOL/L (ref 3.5–5.1)
PROTEIN UA: NEGATIVE MG/DL
PROTHROMBIN TIME: 24.5 SEC (ref 10–13.2)
RBC # BLD: 3.86 M/UL (ref 4.2–5.9)
SODIUM BLD-SCNC: 137 MMOL/L (ref 136–145)
SPECIFIC GRAVITY UA: 1.02 (ref 1–1.03)
TOTAL PROTEIN: 7.6 G/DL (ref 6.4–8.2)
TROPONIN: <0.01 NG/ML
TROPONIN: <0.01 NG/ML
URINE REFLEX TO CULTURE: NORMAL
URINE TYPE: NORMAL
UROBILINOGEN, URINE: 0.2 E.U./DL
WBC # BLD: 7 K/UL (ref 4–11)

## 2021-05-12 PROCEDURE — 84484 ASSAY OF TROPONIN QUANT: CPT

## 2021-05-12 PROCEDURE — 85610 PROTHROMBIN TIME: CPT

## 2021-05-12 PROCEDURE — 83605 ASSAY OF LACTIC ACID: CPT

## 2021-05-12 PROCEDURE — 2580000003 HC RX 258: Performed by: EMERGENCY MEDICINE

## 2021-05-12 PROCEDURE — 2580000003 HC RX 258: Performed by: REGISTERED NURSE

## 2021-05-12 PROCEDURE — 85025 COMPLETE CBC W/AUTO DIFF WBC: CPT

## 2021-05-12 PROCEDURE — 76705 ECHO EXAM OF ABDOMEN: CPT

## 2021-05-12 PROCEDURE — 99285 EMERGENCY DEPT VISIT HI MDM: CPT

## 2021-05-12 PROCEDURE — 93010 ELECTROCARDIOGRAM REPORT: CPT | Performed by: INTERNAL MEDICINE

## 2021-05-12 PROCEDURE — 1200000000 HC SEMI PRIVATE

## 2021-05-12 PROCEDURE — 6360000004 HC RX CONTRAST MEDICATION: Performed by: EMERGENCY MEDICINE

## 2021-05-12 PROCEDURE — 83690 ASSAY OF LIPASE: CPT

## 2021-05-12 PROCEDURE — 80053 COMPREHEN METABOLIC PANEL: CPT

## 2021-05-12 PROCEDURE — 96374 THER/PROPH/DIAG INJ IV PUSH: CPT

## 2021-05-12 PROCEDURE — 99222 1ST HOSP IP/OBS MODERATE 55: CPT | Performed by: SURGERY

## 2021-05-12 PROCEDURE — 71045 X-RAY EXAM CHEST 1 VIEW: CPT

## 2021-05-12 PROCEDURE — 6360000002 HC RX W HCPCS: Performed by: EMERGENCY MEDICINE

## 2021-05-12 PROCEDURE — 6370000000 HC RX 637 (ALT 250 FOR IP): Performed by: REGISTERED NURSE

## 2021-05-12 PROCEDURE — 81003 URINALYSIS AUTO W/O SCOPE: CPT

## 2021-05-12 PROCEDURE — 6360000002 HC RX W HCPCS: Performed by: REGISTERED NURSE

## 2021-05-12 PROCEDURE — 74177 CT ABD & PELVIS W/CONTRAST: CPT

## 2021-05-12 PROCEDURE — 96375 TX/PRO/DX INJ NEW DRUG ADDON: CPT

## 2021-05-12 PROCEDURE — 93005 ELECTROCARDIOGRAM TRACING: CPT | Performed by: EMERGENCY MEDICINE

## 2021-05-12 RX ORDER — POLYETHYLENE GLYCOL 3350 17 G/17G
17 POWDER, FOR SOLUTION ORAL DAILY PRN
Status: DISCONTINUED | OUTPATIENT
Start: 2021-05-12 | End: 2021-05-13 | Stop reason: HOSPADM

## 2021-05-12 RX ORDER — DIGOXIN 125 MCG
125 TABLET ORAL DAILY
Status: DISCONTINUED | OUTPATIENT
Start: 2021-05-13 | End: 2021-05-13 | Stop reason: HOSPADM

## 2021-05-12 RX ORDER — ACETAMINOPHEN 325 MG/1
650 TABLET ORAL EVERY 6 HOURS PRN
Status: DISCONTINUED | OUTPATIENT
Start: 2021-05-12 | End: 2021-05-13 | Stop reason: HOSPADM

## 2021-05-12 RX ORDER — SPIRONOLACTONE 25 MG/1
25 TABLET ORAL DAILY
Status: DISCONTINUED | OUTPATIENT
Start: 2021-05-13 | End: 2021-05-13 | Stop reason: HOSPADM

## 2021-05-12 RX ORDER — 0.9 % SODIUM CHLORIDE 0.9 %
1000 INTRAVENOUS SOLUTION INTRAVENOUS ONCE
Status: COMPLETED | OUTPATIENT
Start: 2021-05-12 | End: 2021-05-12

## 2021-05-12 RX ORDER — PROMETHAZINE HYDROCHLORIDE 25 MG/1
12.5 TABLET ORAL EVERY 6 HOURS PRN
Status: DISCONTINUED | OUTPATIENT
Start: 2021-05-12 | End: 2021-05-13 | Stop reason: HOSPADM

## 2021-05-12 RX ORDER — ONDANSETRON 2 MG/ML
4 INJECTION INTRAMUSCULAR; INTRAVENOUS ONCE
Status: COMPLETED | OUTPATIENT
Start: 2021-05-12 | End: 2021-05-12

## 2021-05-12 RX ORDER — MONTELUKAST SODIUM 10 MG/1
10 TABLET ORAL NIGHTLY
Status: DISCONTINUED | OUTPATIENT
Start: 2021-05-12 | End: 2021-05-13 | Stop reason: HOSPADM

## 2021-05-12 RX ORDER — SODIUM CHLORIDE 9 MG/ML
1000 INJECTION, SOLUTION INTRAVENOUS CONTINUOUS
Status: DISCONTINUED | OUTPATIENT
Start: 2021-05-12 | End: 2021-05-12

## 2021-05-12 RX ORDER — CLONAZEPAM 0.5 MG/1
0.5 TABLET ORAL 2 TIMES DAILY PRN
Status: DISCONTINUED | OUTPATIENT
Start: 2021-05-12 | End: 2021-05-13 | Stop reason: HOSPADM

## 2021-05-12 RX ORDER — SODIUM CHLORIDE 9 MG/ML
25 INJECTION, SOLUTION INTRAVENOUS PRN
Status: DISCONTINUED | OUTPATIENT
Start: 2021-05-12 | End: 2021-05-13 | Stop reason: HOSPADM

## 2021-05-12 RX ORDER — SODIUM CHLORIDE 0.9 % (FLUSH) 0.9 %
5-40 SYRINGE (ML) INJECTION EVERY 12 HOURS SCHEDULED
Status: DISCONTINUED | OUTPATIENT
Start: 2021-05-12 | End: 2021-05-13 | Stop reason: HOSPADM

## 2021-05-12 RX ORDER — MORPHINE SULFATE 4 MG/ML
4 INJECTION, SOLUTION INTRAMUSCULAR; INTRAVENOUS ONCE
Status: COMPLETED | OUTPATIENT
Start: 2021-05-12 | End: 2021-05-12

## 2021-05-12 RX ORDER — LEVOTHYROXINE SODIUM 112 UG/1
112 TABLET ORAL DAILY
Status: DISCONTINUED | OUTPATIENT
Start: 2021-05-13 | End: 2021-05-13 | Stop reason: HOSPADM

## 2021-05-12 RX ORDER — TORSEMIDE 20 MG/1
20 TABLET ORAL DAILY
Status: DISCONTINUED | OUTPATIENT
Start: 2021-05-13 | End: 2021-05-13 | Stop reason: HOSPADM

## 2021-05-12 RX ORDER — ONDANSETRON 2 MG/ML
4 INJECTION INTRAMUSCULAR; INTRAVENOUS EVERY 6 HOURS PRN
Status: DISCONTINUED | OUTPATIENT
Start: 2021-05-12 | End: 2021-05-13 | Stop reason: HOSPADM

## 2021-05-12 RX ORDER — METOPROLOL SUCCINATE 50 MG/1
200 TABLET, EXTENDED RELEASE ORAL DAILY
Status: DISCONTINUED | OUTPATIENT
Start: 2021-05-13 | End: 2021-05-13 | Stop reason: HOSPADM

## 2021-05-12 RX ORDER — ACETAMINOPHEN 650 MG/1
650 SUPPOSITORY RECTAL EVERY 6 HOURS PRN
Status: DISCONTINUED | OUTPATIENT
Start: 2021-05-12 | End: 2021-05-13 | Stop reason: HOSPADM

## 2021-05-12 RX ORDER — SODIUM CHLORIDE 0.9 % (FLUSH) 0.9 %
5-40 SYRINGE (ML) INJECTION PRN
Status: DISCONTINUED | OUTPATIENT
Start: 2021-05-12 | End: 2021-05-13 | Stop reason: HOSPADM

## 2021-05-12 RX ORDER — ALLOPURINOL 100 MG/1
100 TABLET ORAL DAILY
Status: DISCONTINUED | OUTPATIENT
Start: 2021-05-13 | End: 2021-05-13 | Stop reason: HOSPADM

## 2021-05-12 RX ORDER — MORPHINE SULFATE 2 MG/ML
2 INJECTION, SOLUTION INTRAMUSCULAR; INTRAVENOUS
Status: DISCONTINUED | OUTPATIENT
Start: 2021-05-12 | End: 2021-05-13 | Stop reason: HOSPADM

## 2021-05-12 RX ORDER — MORPHINE SULFATE 4 MG/ML
4 INJECTION, SOLUTION INTRAMUSCULAR; INTRAVENOUS
Status: DISCONTINUED | OUTPATIENT
Start: 2021-05-12 | End: 2021-05-13 | Stop reason: HOSPADM

## 2021-05-12 RX ORDER — GABAPENTIN 300 MG/1
300 CAPSULE ORAL 3 TIMES DAILY
Status: DISCONTINUED | OUTPATIENT
Start: 2021-05-12 | End: 2021-05-13 | Stop reason: HOSPADM

## 2021-05-12 RX ADMIN — GABAPENTIN 300 MG: 300 CAPSULE ORAL at 16:18

## 2021-05-12 RX ADMIN — MORPHINE SULFATE 4 MG: 4 INJECTION, SOLUTION INTRAMUSCULAR; INTRAVENOUS at 07:30

## 2021-05-12 RX ADMIN — SODIUM CHLORIDE 1000 ML: 9 INJECTION, SOLUTION INTRAVENOUS at 07:30

## 2021-05-12 RX ADMIN — PIPERACILLIN AND TAZOBACTAM 3375 MG: 3; .375 INJECTION, POWDER, LYOPHILIZED, FOR SOLUTION INTRAVENOUS at 11:46

## 2021-05-12 RX ADMIN — SODIUM CHLORIDE 1000 ML: 9 INJECTION, SOLUTION INTRAVENOUS at 11:45

## 2021-05-12 RX ADMIN — PIPERACILLIN AND TAZOBACTAM 3375 MG: 3; .375 INJECTION, POWDER, LYOPHILIZED, FOR SOLUTION INTRAVENOUS at 20:30

## 2021-05-12 RX ADMIN — SODIUM CHLORIDE 1000 ML: 9 INJECTION, SOLUTION INTRAVENOUS at 15:17

## 2021-05-12 RX ADMIN — MONTELUKAST SODIUM 10 MG: 10 TABLET ORAL at 20:30

## 2021-05-12 RX ADMIN — GABAPENTIN 300 MG: 300 CAPSULE ORAL at 20:30

## 2021-05-12 RX ADMIN — IOPAMIDOL 75 ML: 755 INJECTION, SOLUTION INTRAVENOUS at 09:55

## 2021-05-12 RX ADMIN — ONDANSETRON 4 MG: 2 INJECTION INTRAMUSCULAR; INTRAVENOUS at 07:30

## 2021-05-12 ASSESSMENT — PAIN SCALES - GENERAL
PAINLEVEL_OUTOF10: 4
PAINLEVEL_OUTOF10: 8
PAINLEVEL_OUTOF10: 4
PAINLEVEL_OUTOF10: 6
PAINLEVEL_OUTOF10: 4

## 2021-05-12 ASSESSMENT — PAIN DESCRIPTION - PAIN TYPE: TYPE: ACUTE PAIN

## 2021-05-12 ASSESSMENT — PAIN DESCRIPTION - LOCATION: LOCATION: ABDOMEN

## 2021-05-12 NOTE — ED NOTES
Bed: 09  Expected date:   Expected time:   Means of arrival: Lilo  Comments:  [facility], RN  05/12/21 0074

## 2021-05-12 NOTE — ED NOTES
Obtained 150ml kashif colored urine from pt, into urinal. Labeled specimen and sent to lab     [address] [place] [address]  05/12/21 3001

## 2021-05-12 NOTE — ED NOTES
,Phu Bae@[employer]  Re: admit.  RUQ pain, cholecystitis  NP-Liliana Murray Sides accepted pt     Dessie Cheese Naegele  05/12/21 1145

## 2021-05-12 NOTE — ED NOTES
RN called lab regarding patients results. Lab states they do not have specimen in lab. Redraw obtained.       Jackie Banks RN  05/12/21 9403

## 2021-05-12 NOTE — ED NOTES
Patient requested to sit up on the side of the bed. Patient verbalized understanding of fall precautions and states he will not stand without staff assistance at bedside. Patients call light within reach.       Tapan Cuba RN  05/12/21 9819

## 2021-05-12 NOTE — PROGRESS NOTES
4 Eyes Skin Assessment     The patient is being assess for   Admission    I agree that 2 RN's have performed a thorough Head to Toe Skin Assessment on the patient. ALL assessment sites listed below have been assessed. Areas assessed by both nurses:   [x]   Head, Face, and Ears   [x]   Shoulders, Back, and Chest, Abdomen  [x]   Arms, Elbows, and Hands   [x]   Coccyx, Sacrum, and Ischium  [x]   Legs, Feet, and Heels            **SHARE this note so that the co-signing nurse is able to place an eSignature**    Co-signer eSignature: Electronically signed by Blanca Bowman RN on 5/12/21 at 4:42 PM EDT    Does the Patient have Skin Breakdown?   Yes LDA WOUND CARE was Initiated documentation include the Libby-wound, Wound Assessment, Measurements, Dressing Treatment, Drainage, and Color\",          Kaleb Prevention initiated:  Yes   Wound Care Orders initiated:  No      WOC nurse consulted for Pressure Injury (Stage 3,4, Unstageable, DTI, NWPT, Complex wounds)and New or Established Ostomies:  No      Primary Nurse eSignature: Electronically signed by Matthew Carbajal RN on 5/12/21 at 3:21 PM EDT

## 2021-05-12 NOTE — PROGRESS NOTES
Patient admitted to room 370 from ED. Patient oriented to room, call light, bed rails, phone, lights and bathroom. Patient instructed about the schedule of the day including: vital sign frequency, lab draws, possible tests, frequency of MD and staff rounds, including RN/MD rounding together at bedside, daily weights, and I &O's. Patient instructed about prescribed diet, how to use 8MENU, and television. Bed alarm in place, patient aware of placement and reason. Telemetry box 42 in place, patient aware of placement and reason. Bed locked, in lowest position, side rails up 2/4, call light within reach. Will continue to monitor.

## 2021-05-12 NOTE — ED NOTES
Patient provided with warm blanket. No other needs at this time.       Jose Viveros RN  05/12/21 1037

## 2021-05-12 NOTE — ED PROVIDER NOTES
CHIEF COMPLAINT  Abdominal Pain (upper right side abdominal pain, started around 1am. ) and Chest Pain (chest pain starting around 1am. EMS states history of AFIB. )      HISTORY OF PRESENT ILLNESS  Anh Frias is a 73 y.o. male with a history of asthma, atrial fibrillation, hyperlipidemia, and hypertension who presents to the ED complaining of abdominal pain/chest pain. Patient reports that since late last night he has been noting increasing upper abdominal pain/chest pain. Patient denies fevers, chills, or sweats. No nausea, or vomiting. No constipation reported. No obvious alleviating or exacerbating factors. Pain is rated as moderate to severe. .   No other complaints, modifying factors or associated symptoms. I have reviewed the following from the nursing documentation.     Past Medical History:   Diagnosis Date    Asthma     Atrial fibrillation ([facility] [address]     Hyperlipidemia     Hypertension     Thyroid disease      Past Surgical History:   Procedure Laterality Date    ABLATION OF DYSRHYTHMIC FOCUS  2009 AND 2009    TIMES TWO    APPENDECTOMY      CARDIAC CATHETERIZATION Left 2015    CARDIAC DEFIBRILLATOR PLACEMENT  2016    LAPAROSCOPIC APPENDECTOMY N/A 9/11/2020    LAPAROSCOPIC APPENDECTOMY, performed by Sudha Taylor MD at [facility] History   Problem Relation Age of Onset    Cancer Mother     Cancer Father      Social History     Socioeconomic History    Marital status:      Spouse name: Not on file    Number of children: Not on file    Years of education: Not on file    Highest education level: Not on file   Occupational History    Not on file   Social Needs    Financial resource strain: Not on file    Food insecurity     Worry: Not on file     Inability: Not on file    Transportation needs     Medical: Not on file     Non-medical: Not on file   Tobacco Use    Smoking status: Never Smoker    Smokeless tobacco: Former User     Types: Snuff daily       montelukast (SINGULAIR) 10 MG tablet Take 10 mg by mouth nightly       torsemide (DEMADEX) 20 MG tablet Take 20 mg by mouth daily       vardenafil (LEVITRA) 20 MG tablet Take 1/2 tablet by mouth one hour before sexual activity      warfarin (COUMADIN) 5 MG tablet 5 mg Indications: 2 5mg tablet every day but wednesday and on wednesday take 12.5 mg       azelastine (ASTELIN) 0.1 % nasal spray 1 spray by Nasal route 2 times daily Use in each nostril as directed       Facility-Administered Medications Ordered in Other Encounters   Medication Dose Route Frequency Provider Last Rate Last Admin    lidocaine (LMX) 4 % cream   Topical Once Elena Oiler, DPM         Allergies   Allergen Reactions    Pravastatin      Patient does recall reaction       REVIEW OF SYSTEMS  10 systems reviewed, pertinent positives per HPI otherwise noted to be negative. PHYSICAL EXAM  /77   Pulse 89   Temp 98.1 °F (36.7 °C) (Oral)   Resp 17   Ht 6' 5\" (1.956 m)   Wt (!) 400 lb (181.4 kg)   SpO2 96%   BMI 47.43 kg/m²   GENERAL APPEARANCE: Awake and alert. Cooperative. No acute distress. HEAD: Normocephalic. Atraumatic. EYES: PERRL. EOM's grossly intact. ENT: Mucous membranes are moist.   NECK: Supple, trachea midline. HEART: RRR. Normal S1, S2. No murmurs, rubs or gallops. LUNGS: Respirations unlabored. CTAB. Good air exchange. No wheezes, rales, or rhonchi. Speaking comfortably in full sentences. ABDOMEN: Soft. Non-distended. Right upper quadrant/epigastric tenderness to palpation. Voluntary guarding without rebound tenderness. Normal Bowel sounds. EXTREMITIES: No peripheral edema. MAEE. No acute deformities. SKIN: Warm and dry. No acute rashes. NEUROLOGICAL: Alert and oriented X 3. CN II-XII intact. No gross facial drooping. Strength 5/5, sensation intact. No pronator drift. Normal coordination. Gait normal.   PSYCHIATRIC: Normal mood and affect.     LABS  I have reviewed all labs for this visit. Results for orders placed or performed during the hospital encounter of 05/12/21   EKG 12 Lead   Result Value Ref Range    Ventricular Rate 89 BPM    Atrial Rate 110 BPM    QRS Duration 104 ms    Q-T Interval 376 ms    QTc Calculation (Bazett) 457 ms    R Axis 68 degrees    T Axis -7 degrees    Diagnosis       Atrial fibrillationNonspecific ST and T wave abnormality , probably digitalis effectAbnormal ECGWhen compared with ECG of 13-SEP-2020 20:05,Nonspecific T wave abnormality no longer evident in Anterior leads       EKG  The Ekg interpreted by myself  atrial fibrillation with a rate of 89  Axis is   Normal  QTc is  normal  Intervals and Durations are unremarkable. Non specific ST-T wave changes appreciated. No evidence of acute ischemia. No significant change from prior EKG dated September 13, 2020    Cardiac Monitoring: Irregularly irregular. Normal rate. RADIOLOGY  X-RAYS:  I have reviewed radiologic plain film image(s). ALL OTHER NON-PLAIN FILM IMAGES SUCH AS CT, ULTRASOUND AND MRI HAVE BEEN READ BY THE RADIOLOGIST. US GALLBLADDER RUQ   Final Result   1. No definite acute abnormality. 2. Diffuse fatty infiltration liver. CT ABDOMEN PELVIS W IV CONTRAST Additional Contrast? None   Final Result   1. Possible cholelithiasis. Moderate distention of the gallbladder with   question of very subtle injection of pericholecystic fat. Combination of   findings raises the possibility of subtle changes of cholecystitis. Clinical   correlation and consideration for follow-up gallbladder ultrasound may be   helpful for more complete evaluation. 2. No evidence of bowel obstruction, intraperitoneal free air, or abscess. Status post hysterectomy. 3. Colonic diverticulosis with no focal finding to suggest changes of   diverticulitis. XR CHEST PORTABLE   Final Result   Right basilar atelectasis or pneumonia.                     Rechecks: Physical assessment performed. Moderate pain relief post meds    GS consulted. ED COURSE/MDM  Patient seen and evaluated. Old records reviewed. Labs and imaging reviewed and results discussed with patient. Patient was given normal saline, Zofran, and morphine in the ED with good symptomatic relief. Patient was reassessed as noted above . Pt will be admitted for symptomatic cholelithiasis with suspected cholecystitis. GS to follow. . Plan of care discussed with patient and family. Patient and family in agreement with plan. Patient was given scripts for the following medications. I counseled patient how to take these medications. New Prescriptions    No medications on file       CLINICAL IMPRESSION  1. Abdominal pain, epigastric    2. Calculus of gallbladder with acute cholecystitis without obstruction        Blood pressure 128/77, pulse 89, temperature 98.1 °F (36.7 °C), temperature source Oral, resp. rate 17, height 6' 5\" (1.956 m), weight (!) 400 lb (181.4 kg), SpO2 96 %. DISPOSITION  Randa Marquez was admitted in stable condition.         Denisse Carr DO  05/17/21 1405

## 2021-05-12 NOTE — PROGRESS NOTES
Patient has chronic wound to LLE, wound care provided by the [facility]. Patient states \"last time I was here you guys worked on my leg and it got worse. I don't want anyone here to touch it this time\".

## 2021-05-12 NOTE — ED NOTES
Called general surgery consult @[phone]  Re: cholelithiasis with cholecystitis per [email or web address]  05/12/21 2106

## 2021-05-12 NOTE — H&P
Hospital Medicine History & Physical      PCP: Emily Nassar    Date of Admission: 5/12/2021    Date of Service: Pt seen/examined on 5/12/2021 and Admitted to Inpatient with expected LOS greater than two midnights due to medical therapy. Chief Complaint:  Abdominal/chest pain     History Of Present Illness:      73 y.o. male with PMH of asthma, Afib on coumadin s/p ICD, HLD, HTN and hypothyroidism who presented to [facility] with complaints of upper abdominal / lower chest pain. Pt reports symptoms have been going on for the last couple of days, intermittently. Pain occurred around 1 am last night associated with diaphoresis. He also belched a few times and had minimal relief with that. No nausea or vomiting. No dyspnea. No fever/chills. No cough/sputum production. No weight gain or worsening lower extremity swelling. Pt has RUQ tenderness with palpation. ED course: EKG showed Afib, non-ischemic. Initial troponin was negative. CT A/P showed possible acute cholecystitis. Lab work revealed elevated LFTs and bilirubin. Normal lipase. Hospitalist service was consulted for admission. Past Medical History:          Diagnosis Date    Asthma     Atrial fibrillation ([address]     Hyperlipidemia     Hypertension     Thyroid disease        Past Surgical History:          Procedure Laterality Date    ABLATION OF DYSRHYTHMIC FOCUS  2009 AND 2009    TIMES TWO    APPENDECTOMY      CARDIAC CATHETERIZATION Left 2015    CARDIAC DEFIBRILLATOR PLACEMENT  2016    LAPAROSCOPIC APPENDECTOMY N/A 9/11/2020    LAPAROSCOPIC APPENDECTOMY, performed by Raul Schafer MD at [facility]       Medications Prior to Admission:      Prior to Admission medications    Medication Sig Start Date End Date Taking?  Authorizing Provider   ALLOPURINOL PO Take 100 mg by mouth daily    Yes Historical Provider, MD   spironolactone (ALDACTONE) 25 MG tablet Take 25 mg by mouth daily   Yes Historical Provider, MD   atorvastatin (LIPITOR) 10 MG tablet Take 10 mg by mouth daily    Yes Historical Provider, MD   digoxin (LANOXIN) 250 MCG tablet Take 125 mcg by mouth daily  8/21/17  Yes Historical Provider, MD   gabapentin (NEURONTIN) 300 MG capsule Take 300 mg by mouth 3 times daily. Yes Historical Provider, MD   Levothyroxine Sodium 112 MCG CAPS Take 112 mcg by mouth daily    Yes Historical Provider, MD   lisinopril (PRINIVIL;ZESTRIL) 40 MG tablet Take 30 mg by mouth daily  3/8/17  Yes Historical Provider, MD   loratadine (CLARITIN) 10 MG tablet Take 10 mg by mouth daily    Yes Historical Provider, MD   metoprolol succinate (TOPROL XL) 50 MG extended release tablet Take 200 mg by mouth daily  8/28/17  Yes Historical Provider, MD   montelukast (SINGULAIR) 10 MG tablet Take 10 mg by mouth nightly    Yes Historical Provider, MD   torsemide (DEMADEX) 20 MG tablet Take 20 mg by mouth daily  4/13/16  Yes Historical Provider, MD   warfarin (COUMADIN) 5 MG tablet 5 mg Indications: 2 5mg tablet every day but wednesday and on wednesday take 12.5 mg    Yes Historical Provider, MD   azelastine (ASTELIN) 0.1 % nasal spray 1 spray by Nasal route 2 times daily Use in each nostril as directed   Yes Historical Provider, MD   testosterone (ANDROGEL; TESTIM) 50 MG/5GM (1%) GEL 1% gel Apply topically daily. Historical Provider, MD   clonazePAM (KLONOPIN) 1 MG tablet Take one-half tablet by mouth twice a day as needed for anxiety    Historical Provider, MD   vardenafil (LEVITRA) 20 MG tablet Take 1/2 tablet by mouth one hour before sexual activity    Historical Provider, MD       Allergies:  Pravastatin    Social History:      The patient currently lives at home. TOBACCO:   reports that he has never smoked. He quit smokeless tobacco use about 32 years ago. His smokeless tobacco use included snuff. ETOH:   reports no history of alcohol use.   E-Cigarettes/Vaping Use     Questions Responses    E-Cigarette/Vaping Use Never User    Start Date     Passive ALKPHOS 64     Recent Labs     05/12/21  1438   INR 2.10*     Recent Labs     05/12/21  0905   TROPONINI <0.01       Urinalysis:      Lab Results   Component Value Date    NITRU Negative 05/12/2021    BLOODU Negative 05/12/2021    SPECGRAV 1.025 05/12/2021    GLUCOSEU Negative 05/12/2021       Radiology:     US GALLBLADDER RUQ   Final Result   1. No definite acute abnormality. 2. Diffuse fatty infiltration liver. CT ABDOMEN PELVIS W IV CONTRAST Additional Contrast? None   Preliminary Result   1. Possible cholelithiasis. Moderate distention of the gallbladder with   question of very subtle injection of pericholecystic fat. Combination of   findings raises the possibility of subtle changes of cholecystitis. Clinical   correlation and consideration for follow-up gallbladder ultrasound may be   helpful for more complete evaluation. 2. No evidence of bowel obstruction, intraperitoneal free air, or abscess. Status post hysterectomy. 3. Colonic diverticulosis with no focal finding to suggest changes of   diverticulitis. XR CHEST PORTABLE   Final Result   Right basilar atelectasis or pneumonia. NM HEPATOBILIARY SCAN W PHARMACOLOGICAL INTERVENTION    (Results Pending)       ASSESSMENT:    Active Hospital Problems    Diagnosis Date Noted    Acute cholecystitis [K81.0] 05/12/2021       Upper abdominal / lower chest pain:  - EKG non-acute in ED. Initial troponin negative. - CT A/P obtained in ED with findings concerning for acute neo. RUQ US was unremarkable. General surgery consulted. Plan for HIDA scan. - Started on clears per surgery, NPO at [facility].   - Pt started on zoysn in the ED -- continue.   - Continue prn analgesia and anti-emetic.   - Trend serial troponins.   - Monitor pt on telemetry. - Consider Cardiology evaluation if biliary pathology ruled out. Nonischemic cardiomyopathy with ICD in situ / chronic systolic CHF / CAD:  - Appears compensated.    - Continue his home BB,

## 2021-05-13 VITALS
BODY MASS INDEX: 37.19 KG/M2 | WEIGHT: 315 LBS | RESPIRATION RATE: 16 BRPM | HEIGHT: 77 IN | TEMPERATURE: 97.9 F | HEART RATE: 97 BPM | OXYGEN SATURATION: 100 % | SYSTOLIC BLOOD PRESSURE: 112 MMHG | DIASTOLIC BLOOD PRESSURE: 71 MMHG

## 2021-05-13 LAB
A/G RATIO: 1 (ref 1.1–2.2)
ALBUMIN SERPL-MCNC: 3.3 G/DL (ref 3.4–5)
ALP BLD-CCNC: 57 U/L (ref 40–129)
ALT SERPL-CCNC: 59 U/L (ref 10–40)
ANION GAP SERPL CALCULATED.3IONS-SCNC: 6 MMOL/L (ref 3–16)
AST SERPL-CCNC: 60 U/L (ref 15–37)
BILIRUB SERPL-MCNC: 1.3 MG/DL (ref 0–1)
BUN BLDV-MCNC: 14 MG/DL (ref 7–20)
CALCIUM SERPL-MCNC: 9.1 MG/DL (ref 8.3–10.6)
CHLORIDE BLD-SCNC: 104 MMOL/L (ref 99–110)
CO2: 28 MMOL/L (ref 21–32)
CREAT SERPL-MCNC: 0.8 MG/DL (ref 0.8–1.3)
GFR AFRICAN AMERICAN: >60
GFR NON-AFRICAN AMERICAN: >60
GLOBULIN: 3.2 G/DL
GLUCOSE BLD-MCNC: 123 MG/DL (ref 70–99)
HCT VFR BLD CALC: 30.9 % (ref 40.5–52.5)
HEMOGLOBIN: 10.3 G/DL (ref 13.5–17.5)
INR BLD: 1.99 (ref 0.86–1.14)
MCH RBC QN AUTO: 30.3 PG (ref 26–34)
MCHC RBC AUTO-ENTMCNC: 33.2 G/DL (ref 31–36)
MCV RBC AUTO: 91.3 FL (ref 80–100)
PDW BLD-RTO: 15.6 % (ref 12.4–15.4)
PLATELET # BLD: 130 K/UL (ref 135–450)
PMV BLD AUTO: 9 FL (ref 5–10.5)
POTASSIUM REFLEX MAGNESIUM: 4.2 MMOL/L (ref 3.5–5.1)
PROTHROMBIN TIME: 23.2 SEC (ref 10–13.2)
RBC # BLD: 3.39 M/UL (ref 4.2–5.9)
SODIUM BLD-SCNC: 138 MMOL/L (ref 136–145)
TOTAL PROTEIN: 6.5 G/DL (ref 6.4–8.2)
TROPONIN: <0.01 NG/ML
TSH REFLEX: 2.69 UIU/ML (ref 0.27–4.2)
WBC # BLD: 6.3 K/UL (ref 4–11)

## 2021-05-13 PROCEDURE — 99231 SBSQ HOSP IP/OBS SF/LOW 25: CPT | Performed by: SURGERY

## 2021-05-13 PROCEDURE — 6360000002 HC RX W HCPCS: Performed by: REGISTERED NURSE

## 2021-05-13 PROCEDURE — 36415 COLL VENOUS BLD VENIPUNCTURE: CPT

## 2021-05-13 PROCEDURE — 6370000000 HC RX 637 (ALT 250 FOR IP): Performed by: REGISTERED NURSE

## 2021-05-13 PROCEDURE — APPSS45 APP SPLIT SHARED TIME 31-45 MINUTES: Performed by: CLINICAL NURSE SPECIALIST

## 2021-05-13 PROCEDURE — 85610 PROTHROMBIN TIME: CPT

## 2021-05-13 PROCEDURE — 2580000003 HC RX 258: Performed by: REGISTERED NURSE

## 2021-05-13 PROCEDURE — 2580000003 HC RX 258: Performed by: INTERNAL MEDICINE

## 2021-05-13 PROCEDURE — 84484 ASSAY OF TROPONIN QUANT: CPT

## 2021-05-13 PROCEDURE — 85027 COMPLETE CBC AUTOMATED: CPT

## 2021-05-13 PROCEDURE — APPNB45 APP NON BILLABLE 31-45 MINUTES: Performed by: CLINICAL NURSE SPECIALIST

## 2021-05-13 PROCEDURE — 84443 ASSAY THYROID STIM HORMONE: CPT

## 2021-05-13 PROCEDURE — 80053 COMPREHEN METABOLIC PANEL: CPT

## 2021-05-13 RX ORDER — 0.9 % SODIUM CHLORIDE 0.9 %
1000 INTRAVENOUS SOLUTION INTRAVENOUS ONCE
Status: COMPLETED | OUTPATIENT
Start: 2021-05-13 | End: 2021-05-13

## 2021-05-13 RX ORDER — AMOXICILLIN AND CLAVULANATE POTASSIUM 875; 125 MG/1; MG/1
1 TABLET, FILM COATED ORAL 2 TIMES DAILY
Qty: 14 TABLET | Refills: 0 | Status: SHIPPED | OUTPATIENT
Start: 2021-05-13 | End: 2021-05-20

## 2021-05-13 RX ADMIN — GABAPENTIN 300 MG: 300 CAPSULE ORAL at 08:37

## 2021-05-13 RX ADMIN — PIPERACILLIN AND TAZOBACTAM 3375 MG: 3; .375 INJECTION, POWDER, LYOPHILIZED, FOR SOLUTION INTRAVENOUS at 04:57

## 2021-05-13 RX ADMIN — GABAPENTIN 300 MG: 300 CAPSULE ORAL at 13:38

## 2021-05-13 RX ADMIN — SPIRONOLACTONE 25 MG: 25 TABLET ORAL at 08:37

## 2021-05-13 RX ADMIN — PIPERACILLIN AND TAZOBACTAM 3375 MG: 3; .375 INJECTION, POWDER, LYOPHILIZED, FOR SOLUTION INTRAVENOUS at 11:48

## 2021-05-13 RX ADMIN — LEVOTHYROXINE SODIUM 112 MCG: 0.11 TABLET ORAL at 04:58

## 2021-05-13 RX ADMIN — DIGOXIN 125 MCG: 125 TABLET ORAL at 08:37

## 2021-05-13 RX ADMIN — SODIUM CHLORIDE 1000 ML: 9 INJECTION, SOLUTION INTRAVENOUS at 05:24

## 2021-05-13 RX ADMIN — SODIUM CHLORIDE 1000 ML: 9 INJECTION, SOLUTION INTRAVENOUS at 08:29

## 2021-05-13 RX ADMIN — ALLOPURINOL 100 MG: 100 TABLET ORAL at 08:37

## 2021-05-13 RX ADMIN — POLYETHYLENE GLYCOL 3350 17 G: 17 POWDER, FOR SOLUTION ORAL at 11:50

## 2021-05-13 ASSESSMENT — PAIN SCALES - GENERAL
PAINLEVEL_OUTOF10: 0
PAINLEVEL_OUTOF10: 0
PAINLEVEL_OUTOF10: 3

## 2021-05-13 NOTE — PROGRESS NOTES
Pt discharged home in stable condition. IV and tele box removed. Pt transported to front entrance via wheelchair and left in private vehicle with family. All belongings sent with patient.

## 2021-05-13 NOTE — CONSULTS
Pharmacy Note  Warfarin Consult  Dx: Afib  Goal INR range 2-3   Home Warfarin dose: 7.5 mg daiy; 12.5mg on Wed. Date  INR  Warfarin  5/12               2. 10                           Pt may go to surgery in am. Holding Warfarin dose for now. F/u with team in am.  Daily INR ordered. Rx will continue to manage therapy per consult order.   Duy Morocho, Pharm D.5/13/2021 12:38 AM

## 2021-05-13 NOTE — PLAN OF CARE
Problem: Falls - Risk of:  Goal: Will remain free from falls  Description: Will remain free from falls, bed alarm on, non slip socks on. Outcome: Ongoing  Note: Will remain free of falls, bed alarm on. Problem: Fluid Volume:  Goal: Maintenance of adequate hydration will improve  Description: Maintenance of adequate hydration will improve, patient increasing oral intake. Outcome: Ongoing  Note: Patient will increase oral intake this shift.

## 2021-05-13 NOTE — PROGRESS NOTES
not feel strongly that he needs to be transferred at this time as he is already feeling better. We can obtain this study as on an outpt basis. Will order clear liquid diet for this AM    Discussed with the pt's nurse. Electronically signed by AIDA Alvarenga - [address] [facility]    Surgery Staff    I have examined this patient and read and agree with the note by Greg Blake CNP from today. If he tolerates PO diet this afternoon, would d/c home. Would plan for outpatient HIDA w/ [facility], and prob should f/u with Cardiology as well.     [facility] MOHR

## 2021-05-13 NOTE — PROGRESS NOTES
Per imaging, patient is too large for HIDA scan at [facility]. [facility] has a larger scanner accommodating up to 500 lbs. Will discuss with MD on rounds.

## 2021-05-13 NOTE — PROGRESS NOTES
Comprehensive Nutrition Assessment    Type and Reason for Visit:  Initial, Positive Nutrition Screen    Nutrition Recommendations/Plan:   Continue clear liquid diet (ADAT per MD)  Encourage po intakes  Monitor po intakes, nutrition adequacy, weights, pertinent labs, BMs    Nutrition Assessment:  Positive screen for wounds. Pt admitted with c/o upper abdominal / lower chest pain. Currently on a clear liquid diet. Pt reports that PTA his appetite was good and weight has been around 400 lb consistently. Pt noted to have chronic LLE wound. Pt receives wound care from the [place]. Pt denied need for ONS. Encouraged po intakes when possible. Pt reports that he tries to eat protein foods at every meal. Pt had no nutrition questions at this time. Will follow. Malnutrition Assessment:  Malnutrition Status: At risk for malnutrition (Comment)      Estimated Daily Nutrient Needs:  Energy (kcal):  0931-6714; Weight Used for Energy Requirements:  Current     Protein (g):  113-132; Weight Used for Protein Requirements:  Ideal        Fluid (ml/day):  9052-2007; Method Used for Fluid Requirements:  1 ml/kcal      Nutrition Related Findings:  +2 BLE edema      Wounds:  (Chronic LLE wound)       Current Nutrition Therapies:    DIET CLEAR LIQUID;     Anthropometric Measures:  · Height: 6' 5\" (195.6 cm)  · Current Body Weight: 413 lb (187.3 kg)   · Ideal Body Weight: 208 lbs  · BMI: 49  · BMI Categories: Obese Class 3 (BMI 40.0 or greater)       Nutrition Diagnosis:   · Inadequate oral intake related to altered GI function as evidenced by NPO or clear liquid status due to medical condition    Nutrition Interventions:   Food and/or Nutrient Delivery:  Continue Current Diet(ADAT per MD)  Nutrition Education/Counseling:  No recommendation at this time   Coordination of Nutrition Care:  Continue to monitor while inpatient    Goals:  Pt will tolerate diet and have po intakes       Nutrition Monitoring and Evaluation: Behavioral-Environmental Outcomes:  None Identified   Food/Nutrient Intake Outcomes:  Food and Nutrient Intake, Diet Advancement/Tolerance  Physical Signs/Symptoms Outcomes:  GI Status, Weight     Discharge Planning:     Too soon to determine     Electronically signed by Tori Greenfield RD, LD on 5/13/21 at 1:38 PM EDT    Contact: [phone]

## 2021-05-13 NOTE — CARE COORDINATION
Per provider, Kenyon Willingham, pt will not likely have needs at discharge. General surgery note indicates nuclear medicine scanner at [facility] will not accommodate patient's weight. But, [address] [facility]), CAN accommodate patient's weigh. General surgery team feels test can be done on an outpatient basis. IF patient tolerates diet this afternoon, patient could discharge home. Please consult CM team if we can be of assistance with discharge plan.

## 2021-05-13 NOTE — CONSULTS
Department of General Surgery Consult    PATIENT NAME: Shama Azar   YOB: 1955    ADMISSION DATE: 5/12/2021  6:33 AM      TODAY'S DATE: 5/12/2021    Reason for Consult:  Possible cholecystitis    Chief Complaint: Upper abdominal pain    Requesting Physician:  Enrrique Pereira     HISTORY OF PRESENT ILLNESS:              The patient is a 73 y.o. male who presents with 1 day h/o increasing upper abdominal/lower chest which progressively increased. Thought he was having a heart attack. No fevers, chills, nausea/emesis. Seen ED, had CT which suggested possible gallstones. Admitted, sent for RUQ U/S. Currently feeling a bit better. .    Past Medical History:        Diagnosis Date    Asthma     Atrial fibrillation ([address]     Hyperlipidemia     Hypertension     Thyroid disease        Past Surgical History:        Procedure Laterality Date    ABLATION OF DYSRHYTHMIC FOCUS  2009 AND 2009    TIMES TWO    APPENDECTOMY      CARDIAC CATHETERIZATION Left 2015    CARDIAC DEFIBRILLATOR PLACEMENT  2016    LAPAROSCOPIC APPENDECTOMY N/A 9/11/2020    LAPAROSCOPIC APPENDECTOMY, performed by Roseann Curtis MD at [facility]       Current Medications:   Current Facility-Administered Medications: 0.9 % sodium chloride infusion, 1,000 mL, Intravenous, Continuous  [START ON 5/13/2021] digoxin (LANOXIN) tablet 125 mcg, 125 mcg, Oral, Daily  gabapentin (NEURONTIN) capsule 300 mg, 300 mg, Oral, TID  [START ON 5/13/2021] levothyroxine (SYNTHROID) tablet 112 mcg, 112 mcg, Oral, Daily  [START ON 5/13/2021] metoprolol succinate (TOPROL XL) extended release tablet 200 mg, 200 mg, Oral, Daily  montelukast (SINGULAIR) tablet 10 mg, 10 mg, Oral, Nightly  [START ON 5/13/2021] lisinopril (PRINIVIL;ZESTRIL) tablet 30 mg, 30 mg, Oral, Daily  [START ON 5/13/2021] allopurinol (ZYLOPRIM) tablet 100 mg, 100 mg, Oral, Daily  clonazePAM (KLONOPIN) tablet 0.5 mg, 0.5 mg, Oral, BID PRN  sodium chloride flush 0.9 % injection 5-40 mL, 5-40 mL, Intravenous, 2 times per day  sodium chloride flush 0.9 % injection 5-40 mL, 5-40 mL, Intravenous, PRN  0.9 % sodium chloride infusion, 25 mL, Intravenous, PRN  promethazine (PHENERGAN) tablet 12.5 mg, 12.5 mg, Oral, Q6H PRN **OR** ondansetron (ZOFRAN) injection 4 mg, 4 mg, Intravenous, Q6H PRN  polyethylene glycol (GLYCOLAX) packet 17 g, 17 g, Oral, Daily PRN  acetaminophen (TYLENOL) tablet 650 mg, 650 mg, Oral, Q6H PRN **OR** acetaminophen (TYLENOL) suppository 650 mg, 650 mg, Rectal, Q6H PRN  piperacillin-tazobactam (ZOSYN) 3,375 mg in dextrose 5 % 50 mL IVPB extended infusion (mini-bag), 3,375 mg, Intravenous, Q8H  morphine (PF) injection 2 mg, 2 mg, Intravenous, Q3H PRN **OR** morphine (PF) injection 4 mg, 4 mg, Intravenous, Q3H PRN  Facility-Administered Medications Ordered in Other Encounters: lidocaine (LMX) 4 % cream, , Topical, Once  Prior to Admission medications    Medication Sig Start Date End Date Taking? Authorizing Provider   ALLOPURINOL PO Take 100 mg by mouth daily    Yes Historical Provider, MD   spironolactone (ALDACTONE) 25 MG tablet Take 25 mg by mouth daily   Yes Historical Provider, MD   atorvastatin (LIPITOR) 10 MG tablet Take 10 mg by mouth daily    Yes Historical Provider, MD   digoxin (LANOXIN) 250 MCG tablet Take 125 mcg by mouth daily  8/21/17  Yes Historical Provider, MD   gabapentin (NEURONTIN) 300 MG capsule Take 300 mg by mouth 3 times daily.     Yes Historical Provider, MD   Levothyroxine Sodium 112 MCG CAPS Take 112 mcg by mouth daily    Yes Historical Provider, MD   lisinopril (PRINIVIL;ZESTRIL) 40 MG tablet Take 30 mg by mouth daily  3/8/17  Yes Historical Provider, MD   loratadine (CLARITIN) 10 MG tablet Take 10 mg by mouth daily    Yes Historical Provider, MD   metoprolol succinate (TOPROL XL) 50 MG extended release tablet Take 200 mg by mouth daily  8/28/17  Yes Historical Provider, MD   montelukast (SINGULAIR) 10 MG tablet Take 10 mg by mouth nightly    Yes Historical Provider, MD   torsemide (DEMADEX) 20 MG tablet Take 20 mg by mouth daily  4/13/16  Yes Historical Provider, MD   warfarin (COUMADIN) 5 MG tablet 5 mg Indications: 2 5mg tablet every day but wednesday and on wednesday take 12.5 mg    Yes Historical Provider, MD   azelastine (ASTELIN) 0.1 % nasal spray 1 spray by Nasal route 2 times daily Use in each nostril as directed   Yes Historical Provider, MD   testosterone (ANDROGEL; TESTIM) 50 MG/5GM (1%) GEL 1% gel Apply topically daily. Historical Provider, MD   clonazePAM (KLONOPIN) 1 MG tablet Take one-half tablet by mouth twice a day as needed for anxiety    Historical Provider, MD   vardenafil (LEVITRA) 20 MG tablet Take 1/2 tablet by mouth one hour before sexual activity    Historical Provider, MD        Allergies:  Pravastatin    Social History:   TOBACCO:   reports that he has never smoked. He quit smokeless tobacco use about 32 years ago. His smokeless tobacco use included snuff. ETOH:   reports no history of alcohol use. DRUGS:   reports no history of drug use. OCCUPATION:    Patient currently lives with family      Family History:        Problem Relation Age of Onset    Cancer Mother     Cancer Father        REVIEW OF SYSTEMS:  CONSTITUTIONAL:  positive for  malaise and anorexia  HEENT:  negative  RESPIRATORY:  negative  CARDIOVASCULAR:  negative  GASTROINTESTINAL:  negative except for abdominal pain  GENITOURINARY:  negative  HEMATOLOGIC/LYMPHATIC:  negative  NEUROLOGICAL:  Negative  * All other ROS reviewed and negative. PHYSICAL EXAM:  VITALS:  /84   Pulse 88   Temp 97.5 °F (36.4 °C) (Oral)   Resp 18   Ht 6' 5\" (1.956 m)   Wt (!) 413 lb (187.3 kg)   SpO2 95%   BMI 48.97 kg/m²   24HR INTAKE/OUTPUT:    I/O last 3 completed shifts:   In: 1050 [IV Piggyback:1050]  Out: -   I/O this shift:  In: 240 [P.O.:240]  Out: 300 [Urine:300]      CONSTITUTIONAL:  alert, no apparent distress and morbidly obese  EYES:  PERRL, sclera clear  ENT:  Normocephalic,atraumatic, without obvious abnormality  NECK:  supple, symmetrical, trachea midline  LUNGS: Resp effort easy and unlabored,   CARDIOVASCULAR:  NO JVD,  and    ABDOMEN:   soft, non-distended, tenderness noted in the epigastric region, involuntary guarding absent, no masses palpated   MUSCULOSKELETAL: No clubbing or cyanosis, 0+ pitting edema lower extremities  NEUROLOGIC:  Mental Status Exam:  Level of Alertness:   awake  PSYCHIATRIC:   person, place, time  SKIN:  normal skin color, texture, turgor and no jaundice    DATA:    CBC:   Recent Labs     05/12/21  0905   WBC 7.0   HGB 11.6*   HCT 35.0*        BMP:    Recent Labs     05/12/21 0905      K 4.5      CO2 27   BUN 20   CREATININE 0.7*   GLUCOSE 161*     Hepatic:   Recent Labs     05/12/21 0905   AST 81*   ALT 59*   BILITOT 1.2*   ALKPHOS 64     Mag:    No results for input(s): MG in the last 72 hours. Phos:   No results for input(s): PHOS in the last 72 hours. INR:   Recent Labs     05/12/21  1438   INR 2.10*       Radiology Review: Images personally reviewed by me. CT abd/pelvis  1. Possible cholelithiasis.  Moderate distention of the gallbladder with   question of very subtle injection of pericholecystic fat.  Combination of   findings raises the possibility of subtle changes of cholecystitis.  Clinical   correlation and consideration for follow-up gallbladder ultrasound may be   helpful for more complete evaluation. 2. No evidence of bowel obstruction, intraperitoneal free air, or abscess. Status post hysterectomy. 3. Colonic diverticulosis with no focal finding to suggest changes of   diverticulitis.      RIGHT UPPER QUADRANT ULTRASOUND       5/12/2021 12:51 pm       COMPARISON:   None.       HISTORY:   ORDERING SYSTEM PROVIDED HISTORY: Acute cholecystitis   TECHNOLOGIST PROVIDED HISTORY:   Reason for exam:->Acute cholecystitis       FINDINGS:   LIVER: Johnny Grajeda is diffusely increased in echogenicity consistent with fatty   infiltration.  There is no intrahepatic ductal dilatation.       BILIARY SYSTEM:  Gallbladder is unremarkable without evidence of   pericholecystic fluid, wall thickening or stones.  Negative sonographic   Antunez's sign.       Common bile duct is within normal limits measuring 7 mm.       RIGHT KIDNEY: The right kidney is grossly unremarkable without evidence of   hydronephrosis.       PANCREAS:  Visualized portions of the pancreas are unremarkable.       OTHER: No evidence of right upper quadrant ascites.           Impression   1. No definite acute abnormality. 2. Diffuse fatty infiltration liver.                     IMPRESSION/RECOMMENDATIONS:    No obvious biliary source of his symptoms at this time (ie no gallstones).   To fully evaluate for biliary pathology, will assess for biliary dysfunction     - HIDA w/ CCK   - ok for clears tonight, NPO after MN    Electronically signed by 82 Rachel Tafoya MD     [address]

## 2021-06-16 NOTE — DISCHARGE SUMMARY
Hospital Medicine Discharge Summary    Patient ID: Ana Maxwell      Patient's PCP: Willard Ivey Date: 5/12/2021     Discharge Date: 5/13/2021      Admitting Physician: Susy Winslow MD     Discharge Physician: AIDA Calderon - CNP     Discharge Diagnoses: Active Hospital Problems    Diagnosis     Acute cholecystitis [K81.0]        The patient was seen and examined on day of discharge and this discharge summary is in conjunction with any daily progress note from day of discharge. Hospital Course:     73 y.o. male with PMH of asthma, Afib on coumadin s/p ICD, HLD, HTN and hypothyroidism who presented to [facility] with complaints of upper abdominal / lower chest pain. Pt reports symptoms have been going on for the last couple of days, intermittently. Pain occurred around 1 am last night associated with diaphoresis. He also belched a few times and had minimal relief with that. No nausea or vomiting. No dyspnea. No fever/chills. No cough/sputum production. No weight gain or worsening lower extremity swelling. Pt has RUQ tenderness with palpation. ED course: EKG showed Afib, non-ischemic. Initial troponin was negative. CT A/P showed possible acute cholecystitis. Lab work revealed elevated LFTs and bilirubin. Normal lipase.        Upper abdominal / lower chest pain:  - EKG non-acute in ED. Initial troponin negative. - CT A/P obtained in ED with findings concerning for acute neo. RUQ US was unremarkable. General surgery consulted. - unable to perform testing at [facility] - needs to go to [facility]. Clinically stable. - discharged home on PO Augmentin.     Nonischemic cardiomyopathy with ICD in situ / chronic systolic CHF / CAD:  - Appears compensated. - Continue his home BB, digoxin, aldactone and lisinopril.  Hold statin given elevated LFTs.  - Daily weights, close I's and O's.   - He follows with [facility].      Chronic atrial fibrillation, rate controlled:  - S/p (ALDACTONE) 25 MG tablet Take 25 mg by mouth dailyHistorical Med      atorvastatin (LIPITOR) 10 MG tablet Take 10 mg by mouth daily Historical Med      clonazePAM (KLONOPIN) 1 MG tablet Take one-half tablet by mouth twice a day as needed for anxietyHistorical Med      digoxin (LANOXIN) 250 MCG tablet Take 125 mcg by mouth daily Historical Med      gabapentin (NEURONTIN) 300 MG capsule Take 300 mg by mouth 3 times daily. Historical Med      Levothyroxine Sodium 112 MCG CAPS Take 112 mcg by mouth daily Historical Med      lisinopril (PRINIVIL;ZESTRIL) 40 MG tablet Take 30 mg by mouth daily Historical Med      loratadine (CLARITIN) 10 MG tablet Take 10 mg by mouth daily Historical Med      metoprolol succinate (TOPROL XL) 50 MG extended release tablet Take 200 mg by mouth daily Historical Med      montelukast (SINGULAIR) 10 MG tablet Take 10 mg by mouth nightly Historical Med      torsemide (DEMADEX) 20 MG tablet Take 20 mg by mouth daily Historical Med      vardenafil (LEVITRA) 20 MG tablet Take 1/2 tablet by mouth one hour before sexual activityHistorical Med      warfarin (COUMADIN) 5 MG tablet 5 mg Indications: 2 5mg tablet every day but wednesday and on wednesday take 12.5 mg Historical Med      azelastine (ASTELIN) 0.1 % nasal spray 1 spray by Nasal route 2 times daily Use in each nostril as directedHistorical Med             Time Spent on discharge is more than 30 minutes in the examination, evaluation, counseling and review of medications and discharge plan. Signed:    AIDA Siddiqui CNP   6/15/2021      Thank you Eloise Kessler for the opportunity to be involved in this patient's care. If you have any questions or concerns please feel free to contact me at [phone].

## 2021-11-28 ENCOUNTER — APPOINTMENT (OUTPATIENT)
Dept: GENERAL RADIOLOGY | Age: 66
End: 2021-11-28
Payer: MEDICARE

## 2021-11-28 ENCOUNTER — HOSPITAL ENCOUNTER (EMERGENCY)
Age: 66
Discharge: HOME | End: 2021-11-28
Attending: EMERGENCY MEDICINE
Payer: MEDICARE

## 2021-11-28 VITALS
RESPIRATION RATE: 16 BRPM | SYSTOLIC BLOOD PRESSURE: 110 MMHG | WEIGHT: 315 LBS | OXYGEN SATURATION: 99 % | DIASTOLIC BLOOD PRESSURE: 63 MMHG | BODY MASS INDEX: 37.19 KG/M2 | HEART RATE: 72 BPM | HEIGHT: 77 IN | TEMPERATURE: 98.5 F

## 2021-11-28 DIAGNOSIS — J44.1 COPD EXACERBATION (HCC): ICD-10-CM

## 2021-11-28 DIAGNOSIS — Z11.52 ENCOUNTER FOR SCREENING FOR COVID-19: ICD-10-CM

## 2021-11-28 DIAGNOSIS — L03.116 CELLULITIS OF LEFT LOWER EXTREMITY: Primary | ICD-10-CM

## 2021-11-28 LAB
A/G RATIO: 1 (ref 1.1–2.2)
ALBUMIN SERPL-MCNC: 3.9 G/DL (ref 3.4–5)
ALP BLD-CCNC: 67 U/L (ref 40–129)
ALT SERPL-CCNC: 57 U/L (ref 10–40)
ANION GAP SERPL CALCULATED.3IONS-SCNC: 10 MMOL/L (ref 3–16)
AST SERPL-CCNC: 77 U/L (ref 15–37)
BASOPHILS ABSOLUTE: 0 K/UL (ref 0–0.2)
BASOPHILS RELATIVE PERCENT: 0.4 %
BILIRUB SERPL-MCNC: 0.7 MG/DL (ref 0–1)
BUN BLDV-MCNC: 22 MG/DL (ref 7–20)
CALCIUM SERPL-MCNC: 9.4 MG/DL (ref 8.3–10.6)
CHLORIDE BLD-SCNC: 99 MMOL/L (ref 99–110)
CO2: 24 MMOL/L (ref 21–32)
CREAT SERPL-MCNC: 1.1 MG/DL (ref 0.8–1.3)
EOSINOPHILS ABSOLUTE: 0 K/UL (ref 0–0.6)
EOSINOPHILS RELATIVE PERCENT: 0.7 %
GFR AFRICAN AMERICAN: >60
GFR NON-AFRICAN AMERICAN: >60
GLUCOSE BLD-MCNC: 89 MG/DL (ref 70–99)
HCT VFR BLD CALC: 38.7 % (ref 40.5–52.5)
HEMOGLOBIN: 12.7 G/DL (ref 13.5–17.5)
INR BLD: 1.51 (ref 0.88–1.12)
LYMPHOCYTES ABSOLUTE: 2.2 K/UL (ref 1–5.1)
LYMPHOCYTES RELATIVE PERCENT: 41.6 %
MCH RBC QN AUTO: 30.5 PG (ref 26–34)
MCHC RBC AUTO-ENTMCNC: 32.8 G/DL (ref 31–36)
MCV RBC AUTO: 93 FL (ref 80–100)
MONOCYTES ABSOLUTE: 0.6 K/UL (ref 0–1.3)
MONOCYTES RELATIVE PERCENT: 11 %
NEUTROPHILS ABSOLUTE: 2.5 K/UL (ref 1.7–7.7)
NEUTROPHILS RELATIVE PERCENT: 46.3 %
PDW BLD-RTO: 14.7 % (ref 12.4–15.4)
PLATELET # BLD: 131 K/UL (ref 135–450)
PMV BLD AUTO: 8.4 FL (ref 5–10.5)
POTASSIUM REFLEX MAGNESIUM: 4.8 MMOL/L (ref 3.5–5.1)
PRO-BNP: 436 PG/ML (ref 0–124)
PROTHROMBIN TIME: 17.4 SEC (ref 9.9–12.7)
RBC # BLD: 4.16 M/UL (ref 4.2–5.9)
SODIUM BLD-SCNC: 133 MMOL/L (ref 136–145)
TOTAL PROTEIN: 7.8 G/DL (ref 6.4–8.2)
TROPONIN: <0.01 NG/ML
WBC # BLD: 5.3 K/UL (ref 4–11)

## 2021-11-28 PROCEDURE — 71045 X-RAY EXAM CHEST 1 VIEW: CPT

## 2021-11-28 PROCEDURE — U0003 INFECTIOUS AGENT DETECTION BY NUCLEIC ACID (DNA OR RNA); SEVERE ACUTE RESPIRATORY SYNDROME CORONAVIRUS 2 (SARS-COV-2) (CORONAVIRUS DISEASE [COVID-19]), AMPLIFIED PROBE TECHNIQUE, MAKING USE OF HIGH THROUGHPUT TECHNOLOGIES AS DESCRIBED BY CMS-2020-01-R: HCPCS

## 2021-11-28 PROCEDURE — 6370000000 HC RX 637 (ALT 250 FOR IP): Performed by: EMERGENCY MEDICINE

## 2021-11-28 PROCEDURE — 36415 COLL VENOUS BLD VENIPUNCTURE: CPT

## 2021-11-28 PROCEDURE — 85610 PROTHROMBIN TIME: CPT

## 2021-11-28 PROCEDURE — U0005 INFEC AGEN DETEC AMPLI PROBE: HCPCS

## 2021-11-28 PROCEDURE — 99283 EMERGENCY DEPT VISIT LOW MDM: CPT

## 2021-11-28 PROCEDURE — 80053 COMPREHEN METABOLIC PANEL: CPT

## 2021-11-28 PROCEDURE — 84484 ASSAY OF TROPONIN QUANT: CPT

## 2021-11-28 PROCEDURE — 85025 COMPLETE CBC W/AUTO DIFF WBC: CPT

## 2021-11-28 PROCEDURE — 93005 ELECTROCARDIOGRAM TRACING: CPT | Performed by: EMERGENCY MEDICINE

## 2021-11-28 PROCEDURE — 83880 ASSAY OF NATRIURETIC PEPTIDE: CPT

## 2021-11-28 RX ORDER — CEPHALEXIN 500 MG/1
500 CAPSULE ORAL 4 TIMES DAILY
Qty: 28 CAPSULE | Refills: 0 | Status: SHIPPED | OUTPATIENT
Start: 2021-11-28 | End: 2021-12-05

## 2021-11-28 RX ORDER — IPRATROPIUM BROMIDE AND ALBUTEROL SULFATE 2.5; .5 MG/3ML; MG/3ML
2 SOLUTION RESPIRATORY (INHALATION) ONCE
Status: COMPLETED | OUTPATIENT
Start: 2021-11-28 | End: 2021-11-28

## 2021-11-28 RX ORDER — PREDNISONE 20 MG/1
20 TABLET ORAL 2 TIMES DAILY
Qty: 10 TABLET | Refills: 0 | Status: SHIPPED | OUTPATIENT
Start: 2021-11-28 | End: 2021-12-03

## 2021-11-28 RX ADMIN — IPRATROPIUM BROMIDE AND ALBUTEROL SULFATE 2 AMPULE: .5; 2.5 SOLUTION RESPIRATORY (INHALATION) at 18:41

## 2021-11-28 NOTE — ED PROVIDER NOTES
[address] [facility] [facility] Emergency Department      CHIEF COMPLAINT  Shortness of Breath (c/o sob x4 days productive cough of green/gray spiutum)      HISTORY OF PRESENT ILLNESS  Sunny Dubose is a 73 y.o. male with a history of what he describes as \"bronchitis\" also with history of asthma, A. fib, on Coumadin, hypertension hyperlipidemia presents with shortness of breath, cough and congestion for 4 days. He states he has had a productive cough. He denies fevers or chest pain. He states he has been wheezing. He is not vaccinated for COVID-19. He also states that she has some chronic wounds to his left leg. Having a wound care nurse come to his house but they are no longer coming. No other complaints, modifying factors or associated symptoms. I have reviewed the following from the nursing documentation.     Past Medical History:   Diagnosis Date    Asthma     Atrial fibrillation [facility])     ED (erectile dysfunction)     Hyperlipidemia     Hypertension     Thyroid disease      Past Surgical History:   Procedure Laterality Date    ABLATION OF DYSRHYTHMIC FOCUS  2009 AND 2009    TIMES TWO    APPENDECTOMY      CARDIAC CATHETERIZATION Left 2015    CARDIAC DEFIBRILLATOR PLACEMENT  2016    LAPAROSCOPIC APPENDECTOMY N/A 9/11/2020    LAPAROSCOPIC APPENDECTOMY, performed by Antony Rinaldi MD at [facility] History   Problem Relation Age of Onset    Cancer Mother     Cancer Father      Social History     Socioeconomic History    Marital status:      Spouse name: Not on file    Number of children: Not on file    Years of education: Not on file    Highest education level: Not on file   Occupational History    Not on file   Tobacco Use    Smoking status: Never Smoker    Smokeless tobacco: Former User     Types: Snuff   Vaping Use    Vaping Use: Never used   Substance and Sexual Activity    Alcohol use: No    Drug use: No    Sexual activity: Yes     Partners: Female   Other gabapentin (NEURONTIN) 300 MG capsule Take 300 mg by mouth 3 times daily.  Levothyroxine Sodium 112 MCG CAPS Take 112 mcg by mouth daily       lisinopril (PRINIVIL;ZESTRIL) 40 MG tablet Take 30 mg by mouth daily       loratadine (CLARITIN) 10 MG tablet Take 10 mg by mouth daily       metoprolol succinate (TOPROL XL) 50 MG extended release tablet Take 200 mg by mouth daily       montelukast (SINGULAIR) 10 MG tablet Take 10 mg by mouth nightly       vardenafil (LEVITRA) 20 MG tablet Take 1/2 tablet by mouth one hour before sexual activity      warfarin (COUMADIN) 5 MG tablet 5 mg Indications: 2 5mg tablet every day but wednesday and on wednesday take 12.5 mg       azelastine (ASTELIN) 0.1 % nasal spray 1 spray by Nasal route 2 times daily Use in each nostril as directed       Facility-Administered Medications Ordered in Other Encounters   Medication Dose Route Frequency Provider Last Rate Last Admin    lidocaine (LMX) 4 % cream   Topical Once Nelson Montaño DPM         Allergies   Allergen Reactions    Pravastatin      Patient does recall reaction       REVIEW OF SYSTEMS  10 systems reviewed, pertinent positives per HPI otherwise noted to be negative. PHYSICAL EXAM  /63   Pulse 72   Temp 98.5 °F (36.9 °C) (Oral)   Resp 16   Ht 6' 5\" (1.956 m)   Wt (!) 410 lb (186 kg)   SpO2 99%   BMI 48.62 kg/m²   GENERAL APPEARANCE: Awake and alert. Cooperative. No acute distress. Obese, unkempt, chronically ill-appearing. HEAD: Normocephalic. Atraumatic. EYES:  EOM's grossly intact. ENT: Mucous membranes are moist.   NECK: Supple, trachea midline. HEART: RRR. LUNGS: Respirations unlabored. Scattered expiratory wheezes noted. Speaking comfortably in full sentences. ABDOMEN: Soft. Non-distended. Non-tender. No guarding or rebound. Obese abdomen. EXTREMITIES: Bilateral lower extremity edema with skin breakdown noted in the left lower leg. There is a dressing in place that was removed. 0 - 124 pg/mL   Protime-INR   Result Value Ref Range    Protime 17.4 (H) 9.9 - 12.7 sec    INR 1.51 (H) 0.88 - 1.12   Troponin   Result Value Ref Range    Troponin <0.01 <0.01 ng/mL   COVID-19   Result Value Ref Range    SARS-CoV-2 Detected (A) Not detected   EKG 12 Lead   Result Value Ref Range    Ventricular Rate 73 BPM    Atrial Rate 53 BPM    QRS Duration 104 ms    Q-T Interval 406 ms    QTc Calculation (Bazett) 447 ms    R Axis 78 degrees    T Axis 53 degrees    Diagnosis       Atrial fibrillationLow voltage QRSNonspecific ST abnormalityNon-specific intra-ventricular conduction delayAbnormal ECGWhen compared with ECG of 12-MAY-2021 06:38,No significant change was foundConfirmed by JOVANY GARCIA MD ([id number]) on 11/29/2021 7:37:05 AM       EKG  The Ekg interpreted by myself  atrial fibrillation with a rate of 73  Axis is   Normal  QTc is  normal  Intervals and Durations are unremarkable. No specific ST-T wave changes appreciated. No evidence of acute ischemia. No prior EKG for comparison. Cardiac Monitoring: The cardiac monitor revealed atrial fibrillation as interpreted by me. The cardiac monitor was ordered secondary to the patient's complaint of shortness of breath and to monitor the patient for dysrhythmia. RADIOLOGY  X-RAYS:  I have reviewed radiologic plain film image(s). ALL OTHER NON-PLAIN FILM IMAGES SUCH AS CT, ULTRASOUND AND MRI HAVE BEEN READ BY THE RADIOLOGIST. XR CHEST PORTABLE   Final Result   No acute cardiopulmonary disease. Central vascular congestion without overt   failure. Rechecks: Physical assessment performed. He has been updated on his lab work and chest x-ray findings. He does feel better after breathing treatment here. He does not want to wait for any additional testing and is demanding discharge. ED COURSE/MDM  Patient seen and evaluated. Here the patient is afebrile with normal vitals signs. Old records reviewed.   He is in A. fib here which is chronic. He is not hypoxic. He is chronically ill-appearing. He does appear to have a mild cellulitis of the left leg. The swelling in his legs is symmetric and I have low suspicion for DVT. He has expiratory wheezing noted as well on exam.  He was given a breathing treatment here. Chest x-ray with no acute findings. His lab work all appears to be baseline. He has mild LFT elevations that are chronic. Nonspecific BNP elevation. Troponin is negative. I will start him on steroids for what is likely a COPD exacerbation. I will also start him on Keflex for mild leg cellulitis. Covid testing was done today and is pending at the time of discharge. I will refer him to the wound care center and primary care for follow-up. Labs and imaging reviewed and results discussed with patient. Patient was reassessed as noted above . Plan of care discussed with patient. Patient in agreement with plan. Strict return precautions have been given. Patient was given scripts for the following medications. I counseled patient how to take these medications. Discharge Medication List as of 11/28/2021  8:03 PM      START taking these medications    Details   predniSONE (DELTASONE) 20 MG tablet Take 1 tablet by mouth 2 times daily for 5 days, Disp-10 tablet, R-0Normal      cephALEXin (KEFLEX) 500 MG capsule Take 1 capsule by mouth 4 times daily for 7 days, Disp-28 capsule, R-0Normal               CLINICAL IMPRESSION  1. Cellulitis of left lower extremity    2. COPD exacerbation ([facility] [address]    3. Encounter for screening for COVID-19        Blood pressure 110/63, pulse 72, temperature 98.5 °F (36.9 °C), temperature source Oral, resp. rate 16, height 6' 5\" (1.956 m), weight (!) 410 lb (186 kg), SpO2 99 %. DISPOSITION  Stacia Zuniga was discharged to home in stable condition.     (Please note this note was completed with a voice recognition program.  Efforts were made to edit the dictations but occasionally words are mis-transcribed.)        Cherry Serra MD  11/30/21 2309

## 2021-11-29 ENCOUNTER — CARE COORDINATION (OUTPATIENT)
Dept: CARE COORDINATION | Age: 66
End: 2021-11-29

## 2021-11-29 LAB
EKG ATRIAL RATE: 53 BPM
EKG DIAGNOSIS: NORMAL
EKG Q-T INTERVAL: 406 MS
EKG QRS DURATION: 104 MS
EKG QTC CALCULATION (BAZETT): 447 MS
EKG R AXIS: 78 DEGREES
EKG T AXIS: 53 DEGREES
EKG VENTRICULAR RATE: 73 BPM
SARS-COV-2: DETECTED

## 2021-11-29 PROCEDURE — 93010 ELECTROCARDIOGRAM REPORT: CPT | Performed by: INTERNAL MEDICINE

## 2021-11-29 NOTE — CARE COORDINATION
Patient contacted regarding COVID-19 risk. Discussed COVID-19 related testing which was pending at this time. Test results were pending. Patient informed of results, if available? pending. Ambulatory Care Manager contacted the patient by telephone to perform post discharge assessment. Call within 2 business days of discharge: Yes. Verified name and  with patient as identifiers. Provided introduction to self, and explanation of the CTN/ACM role, and reason for call due to risk factors for infection and/or exposure to COVID-19. Symptoms reviewed with patient who verbalized the following symptoms: no new symptoms and no worsening symptoms. Denies any worsening or newly presenting symptoms. States his granddaughter is enroute to him to bring Rx's for him, Rx's prescribed @ ED yesterday. Due to no new or worsening symptoms encounter was not routed to provider for escalation. Discussed follow-up appointments. If no appointment was previously scheduled, appointment scheduling offered: Yes. Pt will self outreach direct to [place], where he follows for care needs. [facility] follow up appointment(s): No future appointments. Non-[facility] follow up appointment(s):     Non-face-to-face services provided:  Obtained and reviewed discharge summary and/or continuity of care documents     Advance Care Planning:   Does patient have an Advance Directive:  not on file; education provided. Educated patient about risk for severe COVID-19 due to risk factors according to CDC guidelines. ACM reviewed discharge instructions, medical action plan and red flag symptoms with the patient who verbalized understanding. Discussed COVID vaccination status: Yes. Education provided on COVID-19 vaccination as appropriate. Discussed exposure protocols and quarantine with CDC Guidelines.  Patient was given an opportunity to verbalize any questions and concerns and agrees to contact ACM or health care provider for questions related to their healthcare. Reviewed and educated patient on any new and changed medications related to discharge diagnosis     Was patient discharged with a pulse oximeter? No Discussed and confirmed pt understanding of discharge instructions and when to notify provider or seek emergency care. Pt verbalized understanding of above and agreement with the following  Plan:  ACM provided contact information. Plan for follow-up call in 3-5 days based on severity of symptoms and risk factors.

## 2021-12-02 ENCOUNTER — CARE COORDINATION (OUTPATIENT)
Dept: CARE COORDINATION | Age: 66
End: 2021-12-02

## 2021-12-02 NOTE — CARE COORDINATION
Patient contacted regarding COVID-19 diagnosis. Discussed COVID-19 related testing which was available at this time. Test results were positive. Patient informed of results, if available? Yes    Ambulatory Care Manager contacted the patient by telephone to perform follow-up assessment. Verified name and  with patient as identifiers. Patient has following risk factors of: heart failure. Symptoms reviewed with patient who verbalized the following symptoms: no new symptoms and no worsening symptoms. Due to no new or worsening symptoms encounter was not routed to provider for escalation. Educated patient about risk for severe COVID-19 due to risk factors according to CDC guidelines. ACM reviewed discharge instructions, medical action plan and red flag symptoms with the patient who verbalized understanding. Discussed COVID vaccination status: Yes. Education provided on COVID-19 vaccination as appropriate. Discussed exposure protocols and quarantine with CDC Guidelines. Patient was given an opportunity to verbalize any questions and concerns and agrees to contact ACM or health care provider for questions related to their healthcare. Pt denies need for further care transition outreach. Per pt preference - care transition episode is now concluded.

## (undated) DEVICE — [HIGH FLOW INSUFFLATOR,  DO NOT USE IF PACKAGE IS DAMAGED,  KEEP DRY,  KEEP AWAY FROM SUNLIGHT,  PROTECT FROM HEAT AND RADIOACTIVE SOURCES.]: Brand: PNEUMOSURE

## (undated) DEVICE — SUTURE PERMA HND 2-0 L18IN NONABSORBABLE BLK X-1 L22IN 1/2 737G

## (undated) DEVICE — RELOAD STPL SZ 0 L45MM DIA3.5MM 0DEG STD REG TISS BLU TI

## (undated) DEVICE — TROCAR: Brand: KII FIOS FIRST ENTRY

## (undated) DEVICE — GOWN SIRUS NONREIN XL W/TWL: Brand: MEDLINE INDUSTRIES, INC.

## (undated) DEVICE — PACK PROCEDURE SURG LAPAROSCOPY APPY CDS

## (undated) DEVICE — GLOVE SURG SZ 7 L12IN FNGR THK75MIL WHT LTX POLYMER BEAD

## (undated) DEVICE — CUTTER ENDOSCP L340MM LIN ARTC SGL STROKE FIRING ENDOPATH

## (undated) DEVICE — RELOAD STPL H1-2.5X45MM VASC THN TISS WHT 6 ROW B FRM SGL

## (undated) DEVICE — RESERVOIR,SUCTION,100CC,SILICONE: Brand: MEDLINE

## (undated) DEVICE — DUP USE 132696 GRASPER REPROCESSED ENDOSCOPIC SUT ENDOPATH 5MMX33CM

## (undated) DEVICE — PUMP SUC IRR TBNG L10FT W/ HNDPC ASSEMB STRYKEFLOW 2

## (undated) DEVICE — DRAIN SURG 15FR L3/16IN DIA4.7MM SIL CHN RND HUBLESS FULL

## (undated) DEVICE — TISSUE RETRIEVAL SYSTEM: Brand: INZII RETRIEVAL SYSTEM

## (undated) DEVICE — SUTURE VCRL + SZ 0 L27IN ABSRB VLT L26MM UR-6 5/8 CIR VCP603H

## (undated) DEVICE — DISSECTOR ENDOSCP L39CM JAW L14.5MM 360DEG SHFT ROT STR JAW

## (undated) DEVICE — SUTURE MCRYL + SZ 4-0 L18IN ABSRB UD L19MM PS-2 3/8 CIR MCP496G

## (undated) DEVICE — 3M™ TEGADERM™ TRANSPARENT FILM DRESSING FRAME STYLE WITH BORDER, 1616, 4 IN X 4-3/4 IN (10 CM X 12 CM), 50/CT 4CT/CASE: Brand: 3M™ TEGADERM™

## (undated) DEVICE — GAUZE,SPONGE,4"X4",8PLY,STRL,LF,10/TRAY: Brand: MEDLINE

## (undated) DEVICE — TROCAR: Brand: KII SLEEVE

## (undated) DEVICE — SKIN AFFIX SURG ADHESIVE 72/CS 0.55ML: Brand: MEDLINE